# Patient Record
Sex: FEMALE | Race: WHITE | Employment: OTHER | URBAN - NONMETROPOLITAN AREA
[De-identification: names, ages, dates, MRNs, and addresses within clinical notes are randomized per-mention and may not be internally consistent; named-entity substitution may affect disease eponyms.]

---

## 2020-01-27 ENCOUNTER — OFFICE VISIT (OUTPATIENT)
Dept: FAMILY MEDICINE CLINIC | Age: 45
End: 2020-01-27
Payer: COMMERCIAL

## 2020-01-27 ENCOUNTER — HOSPITAL ENCOUNTER (OUTPATIENT)
Dept: GENERAL RADIOLOGY | Age: 45
Discharge: HOME | End: 2020-01-27
Payer: COMMERCIAL

## 2020-01-27 VITALS
HEART RATE: 60 BPM | OXYGEN SATURATION: 98 % | WEIGHT: 128 LBS | BODY MASS INDEX: 23.55 KG/M2 | DIASTOLIC BLOOD PRESSURE: 72 MMHG | HEIGHT: 62 IN | SYSTOLIC BLOOD PRESSURE: 138 MMHG | RESPIRATION RATE: 20 BRPM | TEMPERATURE: 97.6 F

## 2020-01-27 DIAGNOSIS — H10.33 ACUTE BACTERIAL CONJUNCTIVITIS OF BOTH EYES: ICD-10-CM

## 2020-01-27 LAB
ALBUMIN SERPL-MCNC: 4.6 G/DL (ref 3.5–5.2)
ALP BLD-CCNC: 65 U/L (ref 35–104)
ALT SERPL-CCNC: 17 U/L (ref 5–33)
ANION GAP SERPL CALCULATED.3IONS-SCNC: 15 MMOL/L (ref 7–19)
AST SERPL-CCNC: 26 U/L (ref 5–32)
BASOPHILS ABSOLUTE: 0.1 K/UL (ref 0–0.2)
BASOPHILS RELATIVE PERCENT: 0.9 % (ref 0–1)
BILIRUB SERPL-MCNC: 0.3 MG/DL (ref 0.2–1.2)
BUN BLDV-MCNC: 13 MG/DL (ref 6–20)
CALCIUM SERPL-MCNC: 9.3 MG/DL (ref 8.6–10)
CHLORIDE BLD-SCNC: 102 MMOL/L (ref 98–111)
CO2: 23 MMOL/L (ref 22–29)
CREAT SERPL-MCNC: 0.7 MG/DL (ref 0.5–0.9)
EOSINOPHILS ABSOLUTE: 0.5 K/UL (ref 0–0.6)
EOSINOPHILS RELATIVE PERCENT: 5.6 % (ref 0–5)
GFR NON-AFRICAN AMERICAN: >60
GLUCOSE BLD-MCNC: 64 MG/DL (ref 74–109)
HCT VFR BLD CALC: 43.9 % (ref 37–47)
HEMOGLOBIN: 14.1 G/DL (ref 12–16)
IMMATURE GRANULOCYTES #: 0 K/UL
LYMPHOCYTES ABSOLUTE: 2 K/UL (ref 1.1–4.5)
LYMPHOCYTES RELATIVE PERCENT: 23.9 % (ref 20–40)
MCH RBC QN AUTO: 32.9 PG (ref 27–31)
MCHC RBC AUTO-ENTMCNC: 32.1 G/DL (ref 33–37)
MCV RBC AUTO: 102.6 FL (ref 81–99)
MONOCYTES ABSOLUTE: 0.7 K/UL (ref 0–0.9)
MONOCYTES RELATIVE PERCENT: 9 % (ref 0–10)
NEUTROPHILS ABSOLUTE: 5 K/UL (ref 1.5–7.5)
NEUTROPHILS RELATIVE PERCENT: 60.4 % (ref 50–65)
PDW BLD-RTO: 12.6 % (ref 11.5–14.5)
PLATELET # BLD: 285 K/UL (ref 130–400)
PMV BLD AUTO: 10.9 FL (ref 9.4–12.3)
POTASSIUM SERPL-SCNC: 4.1 MMOL/L (ref 3.5–5)
RBC # BLD: 4.28 M/UL (ref 4.2–5.4)
SODIUM BLD-SCNC: 140 MMOL/L (ref 136–145)
TOTAL PROTEIN: 7.6 G/DL (ref 6.6–8.7)
WBC # BLD: 8.2 K/UL (ref 4.8–10.8)

## 2020-01-27 PROCEDURE — 76882 US LMTD JT/FCL EVL NVASC XTR: CPT

## 2020-01-27 PROCEDURE — 99203 OFFICE O/P NEW LOW 30 MIN: CPT | Performed by: NURSE PRACTITIONER

## 2020-01-27 RX ORDER — OMEGA-3S/DHA/EPA/FISH OIL/D3 300MG-1000
400 CAPSULE ORAL DAILY
COMMUNITY

## 2020-01-27 RX ORDER — OXYCODONE AND ACETAMINOPHEN 7.5; 325 MG/1; MG/1
1 TABLET ORAL EVERY 4 HOURS PRN
Status: ON HOLD | COMMUNITY
End: 2021-02-18 | Stop reason: HOSPADM

## 2020-01-27 RX ORDER — SENNA AND DOCUSATE SODIUM 50; 8.6 MG/1; MG/1
1 TABLET, FILM COATED ORAL DAILY
COMMUNITY
End: 2021-12-14

## 2020-01-27 RX ORDER — LANOLIN ALCOHOL/MO/W.PET/CERES
3 CREAM (GRAM) TOPICAL DAILY
COMMUNITY
End: 2021-06-03 | Stop reason: ALTCHOICE

## 2020-01-27 RX ORDER — TOBRAMYCIN 3 MG/ML
1 SOLUTION/ DROPS OPHTHALMIC EVERY 4 HOURS
Qty: 1 BOTTLE | Refills: 0 | Status: SHIPPED | OUTPATIENT
Start: 2020-01-27 | End: 2020-11-23 | Stop reason: SDUPTHER

## 2020-01-27 RX ORDER — DOXYCYCLINE HYCLATE 100 MG
100 TABLET ORAL 2 TIMES DAILY
Qty: 28 TABLET | Refills: 0 | Status: SHIPPED | OUTPATIENT
Start: 2020-01-27 | End: 2020-11-23 | Stop reason: SDUPTHER

## 2020-01-27 RX ORDER — ATORVASTATIN CALCIUM 40 MG/1
40 TABLET, FILM COATED ORAL DAILY
COMMUNITY
End: 2022-03-31 | Stop reason: SDUPTHER

## 2020-01-27 RX ORDER — ATENOLOL 50 MG/1
50 TABLET ORAL DAILY
COMMUNITY
End: 2021-03-16

## 2020-01-27 RX ORDER — FUROSEMIDE 20 MG/1
20 TABLET ORAL 2 TIMES DAILY
COMMUNITY
End: 2021-01-05 | Stop reason: SDUPTHER

## 2020-01-27 RX ORDER — LANOLIN ALCOHOL/MO/W.PET/CERES
1000 CREAM (GRAM) TOPICAL DAILY
COMMUNITY

## 2020-01-27 RX ORDER — IBUPROFEN 800 MG/1
800 TABLET ORAL EVERY 6 HOURS PRN
COMMUNITY
End: 2021-01-04 | Stop reason: ALTCHOICE

## 2020-01-27 SDOH — HEALTH STABILITY: MENTAL HEALTH: HOW OFTEN DO YOU HAVE A DRINK CONTAINING ALCOHOL?: NEVER

## 2020-01-27 ASSESSMENT — PATIENT HEALTH QUESTIONNAIRE - PHQ9
2. FEELING DOWN, DEPRESSED OR HOPELESS: 1
SUM OF ALL RESPONSES TO PHQ9 QUESTIONS 1 & 2: 1
1. LITTLE INTEREST OR PLEASURE IN DOING THINGS: 0
SUM OF ALL RESPONSES TO PHQ QUESTIONS 1-9: 1
SUM OF ALL RESPONSES TO PHQ QUESTIONS 1-9: 1

## 2020-01-27 ASSESSMENT — ENCOUNTER SYMPTOMS
EYE ITCHING: 1
TROUBLE SWALLOWING: 0
EYE DISCHARGE: 1
EYE PAIN: 1
STRIDOR: 0
BACK PAIN: 1
ABDOMINAL DISTENTION: 1
COUGH: 0

## 2020-01-27 NOTE — PROGRESS NOTES
on file   Other Topics Concern    Not on file   Social History Narrative    Not on file       Review of Systems   Constitutional: Negative for fever. HENT: Negative for congestion and trouble swallowing. Eyes: Positive for pain, discharge and itching. Respiratory: Negative for cough and stridor. Cardiovascular: Negative for leg swelling. Gastrointestinal: Positive for abdominal distention. Genitourinary: Positive for difficulty urinating, dysuria and pelvic pain. Musculoskeletal: Positive for back pain and myalgias. Skin: Positive for rash. Neurological: Negative for facial asymmetry and speech difficulty. Psychiatric/Behavioral: Positive for sleep disturbance. The patient is nervous/anxious. OBJECTIVE:    Physical Exam  Constitutional:       Appearance: Normal appearance. She is well-developed and well-groomed. HENT:      Head: Normocephalic and atraumatic. Right Ear: Tympanic membrane, ear canal and external ear normal. There is no impacted cerumen. Left Ear: Tympanic membrane, ear canal and external ear normal. There is no impacted cerumen. Nose: Nose normal.      Mouth/Throat:      Lips: Pink. Mouth: Mucous membranes are moist.      Dentition: Normal dentition. Pharynx: Oropharynx is clear. Uvula midline. Eyes:      General: Lids are normal.         Right eye: No discharge. Left eye: No discharge. Extraocular Movements: Extraocular movements intact. Conjunctiva/sclera: Conjunctivae normal.      Right eye: Right conjunctiva is not injected. Left eye: Left conjunctiva is not injected. Pupils: Pupils are equal, round, and reactive to light. Neck:      Musculoskeletal: Full passive range of motion without pain, normal range of motion and neck supple. Thyroid: No thyromegaly. Vascular: No carotid bruit or JVD. Cardiovascular:      Rate and Rhythm: Normal rate and regular rhythm. Pulses: Normal pulses. ICD-10-CM    1. Acute bacterial conjunctivitis of both eyes H10.33 CBC Auto Differential     Comprehensive Metabolic Panel     doxycycline hyclate (VIBRA-TABS) 100 MG tablet     tobramycin (TOBREX) 0.3 % ophthalmic solution   2. Inflammation of clitoris N76.2 doxycycline hyclate (VIBRA-TABS) 100 MG tablet     US EXTREMITY RIGHT NON VASCULAR LIMITED     CANCELED: US Soft Tissue Limited Area   3. Rash R21        PLAN:    Wesly Mercedes: New Patient (New patient has a rash on Lower abdomen / private area / had a spot that ruptured and now she is sick to stomach and eyes are swollen )  Review labs  Review US  RTC on Thursday     Diagnosis and orders for this visit are above.

## 2020-01-30 ENCOUNTER — OFFICE VISIT (OUTPATIENT)
Dept: FAMILY MEDICINE CLINIC | Age: 45
End: 2020-01-30
Payer: COMMERCIAL

## 2020-01-30 VITALS
DIASTOLIC BLOOD PRESSURE: 88 MMHG | SYSTOLIC BLOOD PRESSURE: 138 MMHG | RESPIRATION RATE: 20 BRPM | OXYGEN SATURATION: 98 % | TEMPERATURE: 98.8 F | HEART RATE: 57 BPM

## 2020-01-30 PROCEDURE — 99214 OFFICE O/P EST MOD 30 MIN: CPT | Performed by: NURSE PRACTITIONER

## 2020-01-30 PROCEDURE — 96372 THER/PROPH/DIAG INJ SC/IM: CPT | Performed by: NURSE PRACTITIONER

## 2020-01-30 RX ORDER — NYSTATIN 100000 [USP'U]/G
POWDER TOPICAL 3 TIMES DAILY
Qty: 60 G | Refills: 0 | Status: SHIPPED | OUTPATIENT
Start: 2020-01-30 | End: 2020-03-02 | Stop reason: SDUPTHER

## 2020-01-30 RX ORDER — CLONIDINE HYDROCHLORIDE 0.1 MG/1
0.1 TABLET ORAL 2 TIMES DAILY PRN
COMMUNITY
End: 2021-03-16 | Stop reason: ALTCHOICE

## 2020-01-30 RX ORDER — FLUCONAZOLE 150 MG/1
150 TABLET ORAL DAILY
Qty: 3 TABLET | Refills: 0 | Status: SHIPPED | OUTPATIENT
Start: 2020-01-30 | End: 2020-02-02

## 2020-01-30 RX ORDER — DEXAMETHASONE SODIUM PHOSPHATE 10 MG/ML
4 INJECTION INTRAMUSCULAR; INTRAVENOUS ONCE
Status: COMPLETED | OUTPATIENT
Start: 2020-01-30 | End: 2020-01-30

## 2020-01-30 RX ORDER — TRIAMCINOLONE ACETONIDE 40 MG/ML
40 INJECTION, SUSPENSION INTRA-ARTICULAR; INTRAMUSCULAR ONCE
Status: COMPLETED | OUTPATIENT
Start: 2020-01-30 | End: 2020-01-30

## 2020-01-30 RX ORDER — QUETIAPINE FUMARATE 50 MG/1
50 TABLET, FILM COATED ORAL 2 TIMES DAILY
Qty: 60 TABLET | Refills: 3 | Status: SHIPPED | OUTPATIENT
Start: 2020-01-30 | End: 2020-04-28

## 2020-01-30 RX ADMIN — TRIAMCINOLONE ACETONIDE 40 MG: 40 INJECTION, SUSPENSION INTRA-ARTICULAR; INTRAMUSCULAR at 13:38

## 2020-01-30 RX ADMIN — DEXAMETHASONE SODIUM PHOSPHATE 4 MG: 10 INJECTION INTRAMUSCULAR; INTRAVENOUS at 13:37

## 2020-01-30 ASSESSMENT — ENCOUNTER SYMPTOMS
EYE REDNESS: 1
TROUBLE SWALLOWING: 0
SHORTNESS OF BREATH: 0
DIARRHEA: 0
ABDOMINAL PAIN: 0
EYE ITCHING: 1
COUGH: 0

## 2020-01-30 NOTE — PROGRESS NOTES
MELIDA Neumann   QUEtiapine (SEROQUEL) 50 MG tablet Take 1 tablet by mouth 2 times daily Yes MELIDA Neumann   oxyCODONE-acetaminophen (PERCOCET) 7.5-325 MG per tablet Take 1 tablet by mouth every 4 hours as needed for Pain. Yes Historical Provider, MD   atorvastatin (LIPITOR) 40 MG tablet Take 40 mg by mouth daily Yes Historical Provider, MD   ibuprofen (ADVIL;MOTRIN) 800 MG tablet Take 800 mg by mouth every 6 hours as needed for Pain Yes Historical Provider, MD   atenolol (TENORMIN) 50 MG tablet Take 50 mg by mouth daily Yes Historical Provider, MD   furosemide (LASIX) 20 MG tablet Take 20 mg by mouth 2 times daily Yes Historical Provider, MD   sennosides-docusate sodium (SENOKOT-S) 8.6-50 MG tablet Take 1 tablet by mouth daily Yes Historical Provider, MD   guaiFENesin (EXPECTORANT COUGH CONTROL PO) Take by mouth Yes Historical Provider, MD   vitamin D3 (CHOLECALCIFEROL) 10 MCG (400 UNIT) TABS tablet Take 400 Units by mouth daily Yes Historical Provider, MD   vitamin B-12 (CYANOCOBALAMIN) 1000 MCG tablet Take 1,000 mcg by mouth daily Yes Historical Provider, MD   melatonin 3 MG TABS tablet Take 3 mg by mouth daily Yes Historical Provider, MD   doxycycline hyclate (VIBRA-TABS) 100 MG tablet Take 1 tablet by mouth 2 times daily for 14 days Yes MELIDA Neumann   tobramycin (TOBREX) 0.3 % ophthalmic solution Place 1 drop into both eyes every 4 hours for 10 days Yes MELIDA Neumann      Allergies   Allergen Reactions    Ceclor [Cefaclor] Swelling    Penicillins Hives    Sulfa Antibiotics        Review of Systems   Constitutional: Negative for chills and fever. HENT: Negative for congestion and trouble swallowing. Eyes: Positive for redness and itching. Respiratory: Negative for cough and shortness of breath. Gastrointestinal: Negative for abdominal pain and diarrhea. Genitourinary: Negative for difficulty urinating and pelvic pain.    Musculoskeletal: Positive for arthralgias (KENALOG-40) injection 40 mg   2. Fungal dermatitis B36.9 fluconazole (DIFLUCAN) 150 MG tablet   3. Insomnia due to medical condition G47.01 QUEtiapine (SEROQUEL) 50 MG tablet   4. Stress disorder, acute F43.0        PLAN:    Nae Meyers: Follow-up (eyes are still swollen / antibiotics are strong hard to keep down. not eating well upset stomach / low grade fever)    Consult Iggy Romero   Diagnosis and orders for this visit are above.

## 2020-02-03 ENCOUNTER — OFFICE VISIT (OUTPATIENT)
Dept: FAMILY MEDICINE CLINIC | Age: 45
End: 2020-02-03
Payer: COMMERCIAL

## 2020-02-03 VITALS
DIASTOLIC BLOOD PRESSURE: 84 MMHG | HEART RATE: 67 BPM | OXYGEN SATURATION: 99 % | TEMPERATURE: 98.1 F | SYSTOLIC BLOOD PRESSURE: 136 MMHG | RESPIRATION RATE: 20 BRPM

## 2020-02-03 PROCEDURE — 99213 OFFICE O/P EST LOW 20 MIN: CPT | Performed by: NURSE PRACTITIONER

## 2020-02-03 RX ORDER — DOXYCYCLINE HYCLATE 100 MG
100 TABLET ORAL 2 TIMES DAILY
Qty: 20 TABLET | Refills: 0 | Status: SHIPPED | OUTPATIENT
Start: 2020-02-03 | End: 2020-08-06

## 2020-02-03 ASSESSMENT — ENCOUNTER SYMPTOMS
EYE DISCHARGE: 0
EYE ITCHING: 0
BACK PAIN: 1
EYE REDNESS: 0
EYE PAIN: 0

## 2020-02-03 NOTE — PROGRESS NOTES
SUBJECTIVE:  Here today for the swollen eyes follow-up  Patient ID: Jessenia Iyer is a 38 y.o. female. HPI:   HPI   Desponded very nicely to the steroid injection we gave her her eyes are no longer red or swollen she is keeping the cats out of her home and she is trying or her room and she is trying to come to keep away from them the rash in the pelvic area is also gone she says she looks normal now she has still noticed that she is got a little bit of discharge still coming from the clitoris area we will do another round of doxycycline in case it is an infected gland and then if it completely resolves good if not then will going to go on and send her to a GYN    Going to try and get her records from the past as far as her heart condition is concerned so that we can see what type of heart surgery she said in the past she is going to give us the name of the patient and signed a release form  Past Medical History:   Diagnosis Date    Cancer ([facility] [address]     cervical, lymphoma    Congenital heart disease     MRSA infection     Stroke [facility])       Prior to Visit Medications    Medication Sig Taking? Authorizing Provider   doxycycline hyclate (VIBRA-TABS) 100 MG tablet Take 1 tablet by mouth 2 times daily for 10 days Yes MELIDA Koroma   cloNIDine (CATAPRES) 0.1 MG tablet Take 0.1 mg by mouth 2 times daily Yes Historical Provider, MD   nystatin (MYCOSTATIN) 672670 UNIT/GM powder Apply topically 3 times daily Abdomen Yes LesiaMELIDA Cabrera   QUEtiapine (SEROQUEL) 50 MG tablet Take 1 tablet by mouth 2 times daily Yes MELIDA Koroma   oxyCODONE-acetaminophen (PERCOCET) 7.5-325 MG per tablet Take 1 tablet by mouth every 4 hours as needed for Pain.  Yes Historical Provider, MD   atorvastatin (LIPITOR) 40 MG tablet Take 40 mg by mouth daily Yes Historical Provider, MD   ibuprofen (ADVIL;MOTRIN) 800 MG tablet Take 800 mg by mouth every 6 hours as needed for Pain Yes Historical Provider, MD   atenolol (TENORMIN) 50 Musculoskeletal: Normal range of motion. Cardiovascular:      Rate and Rhythm: Normal rate and regular rhythm. Heart sounds: Normal heart sounds. No murmur. Pulmonary:      Effort: Pulmonary effort is normal.      Breath sounds: Normal breath sounds. Skin:     General: Skin is warm and dry. Neurological:      Mental Status: She is alert and oriented to person, place, and time. Motor: No weakness or tremor. Coordination: Coordination is intact. Psychiatric:         Attention and Perception: Attention and perception normal.         Mood and Affect: Mood normal.         Speech: Speech normal.         Behavior: Behavior normal. Behavior is cooperative. Thought Content: Thought content normal.         Cognition and Memory: Cognition and memory normal.         Judgment: Judgment normal.        /84 (Site: Left Upper Arm, Position: Sitting, Cuff Size: Medium Adult)   Pulse 67   Temp 98.1 °F (36.7 °C) (Oral)   Resp 20   SpO2 99%      ASSESSMENT:      ICD-10-CM    1. Staph infection B95.8 doxycycline hyclate (VIBRA-TABS) 100 MG tablet       PLAN:    Nae Meyers: Follow-up (allergic reaction is cleared up / also has some questions about some injections she is going to be getting )      Diagnosis and orders for this visit are above.

## 2020-02-18 ENCOUNTER — OFFICE VISIT (OUTPATIENT)
Dept: PSYCHIATRY | Age: 45
End: 2020-02-18
Payer: COMMERCIAL

## 2020-02-18 PROCEDURE — 90791 PSYCH DIAGNOSTIC EVALUATION: CPT | Performed by: SOCIAL WORKER
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
## 2020-02-18 NOTE — PATIENT INSTRUCTIONS
1.  Return in 2 Weeks as scheduled. 2.  Call Jeannie Thomas if you need to come in earlier or reschedule at [address]    1. Recognize Stress:  Learning to recognize when your body is reacting to stress and identifying our stressors are the first steps in managing stress. 2. Take a Break:  A change of pace, no matter how short, gives us a new outlook on old problems. Take a vacation 20 minutes a day - enjoy a change from the daily routine. 3. Learn to Relax:  Under stress, the muscles in our bodies stay tight. One of the most effective ways to combat tensions is deep muscle relaxation. Other techniques that produce muscle and mental relaxation are yoga, prayer, and deep breathing. 4. Be Nutritionally Aware:  Good nutrition is vital to optimum health, and is especially critical when we are under unusual stress, or going through a major life change. 5. Exercise Regularly:  Just like nutrition, exercise is imperative for maintaining good fitness. Whatever you enjoy - swimming, walking, jogging, aerobic exercise - will help you let off steam and work out stress. 6. Plan your Work:  Tension and anxiety really build up when our work seems endless. Plan your work to use time and energy more efficiently. Take one thing at a time. 7. Talk it Over: This may be the most important thing you can do for yourself if you cant get a handle on things. Find a good listener. Just as a pressure relief valve allows steam to flow out of a pressure cooker and keeps it from blowing up, so talking allows stress to flow out of the body and keeps us from blowing up. 8. Accept What You Cannot Change:  If the problem is beyond your control at this time, try your best to accept it until you can change it. It beats spinning your wheels and getting nowhere. 9. Evaluate Your Perceptions:  What we think is sometimes what we feel.   If we constantly think unrealistic or alarming thoughts

## 2020-03-02 ENCOUNTER — OFFICE VISIT (OUTPATIENT)
Dept: FAMILY MEDICINE CLINIC | Age: 45
End: 2020-03-02
Payer: COMMERCIAL

## 2020-03-02 VITALS
HEART RATE: 58 BPM | DIASTOLIC BLOOD PRESSURE: 86 MMHG | SYSTOLIC BLOOD PRESSURE: 138 MMHG | RESPIRATION RATE: 20 BRPM | WEIGHT: 133 LBS | TEMPERATURE: 98.3 F | BODY MASS INDEX: 24.33 KG/M2 | OXYGEN SATURATION: 98 %

## 2020-03-02 PROCEDURE — 99214 OFFICE O/P EST MOD 30 MIN: CPT | Performed by: NURSE PRACTITIONER

## 2020-03-02 RX ORDER — NYSTATIN 100000 [USP'U]/G
POWDER TOPICAL 3 TIMES DAILY
Qty: 60 G | Refills: 0 | Status: SHIPPED | OUTPATIENT
Start: 2020-03-02 | End: 2020-08-06

## 2020-03-02 RX ORDER — NYSTATIN 100000 [USP'U]/G
POWDER TOPICAL 3 TIMES DAILY
Qty: 60 G | Refills: 0 | Status: CANCELLED | OUTPATIENT
Start: 2020-03-02

## 2020-03-02 RX ORDER — CLONAZEPAM 0.5 MG/1
0.5 TABLET ORAL 2 TIMES DAILY PRN
Qty: 2 TABLET | Refills: 0 | Status: SHIPPED | OUTPATIENT
Start: 2020-03-02 | End: 2020-08-03 | Stop reason: ALTCHOICE

## 2020-03-02 RX ORDER — NYSTATIN 100000 U/G
OINTMENT TOPICAL
Qty: 30 G | Refills: 2 | Status: SHIPPED | OUTPATIENT
Start: 2020-03-02 | End: 2021-02-04

## 2020-03-02 ASSESSMENT — ENCOUNTER SYMPTOMS
SINUS PAIN: 0
SINUS PRESSURE: 0
COUGH: 0
NAUSEA: 0
VOMITING: 0
SHORTNESS OF BREATH: 1
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
## 2020-04-28 ENCOUNTER — TELEPHONE (OUTPATIENT)
Dept: PRIMARY CARE CLINIC | Age: 45
End: 2020-04-28

## 2020-04-28 RX ORDER — QUETIAPINE FUMARATE 50 MG/1
TABLET, FILM COATED ORAL
Qty: 180 TABLET | Refills: 0 | Status: SHIPPED | OUTPATIENT
Start: 2020-04-28 | End: 2020-07-30

## 2020-04-30 ENCOUNTER — NURSE TRIAGE (OUTPATIENT)
Dept: OTHER | Facility: CLINIC | Age: 45
End: 2020-04-30

## 2020-04-30 NOTE — TELEPHONE ENCOUNTER
Reason for Disposition   Systolic BP >= 893 OR Diastolic >= 435, and any cardiac or neurologic symptoms (e.g., chest pain, difficulty breathing, unsteady gait, blurred vision)    Protocols used: HIGH BLOOD PRESSURE-ADULT-[place]    Patient called pre-service center [facility]) [address] to schedule appointment, with red flag complaint, transferred to RN access for triage. Pt states her BP has been high lately. She has been taking BP 2 x daily. States 200/160, 170/ 100, 180/ 125, 183/ 160, 178/138. Today readings 247/185. Pt has a wrist cuff and has had it checked for accuracy. Pt also c/o HA, some blurred vision, and dizziness. She states \"I haven't seen my cardiologist in awhile. \"     Pt is also very tearful about some home and social issues. She states her disability is being taken away and she will be losing her insurance in a month. Triage indicates for pt to be seen in the ED/ [facility] or PCP office with approval. Writer recommends pt to be seen in the ED with BP readings and depressive state. Pt instructed to call back for any new or worsening symptoms. Patient verbalized understanding. Patient denies any other questions or concerns. Please do not respond to the triage nurse through this encounter. Any subsequent communication should be directly with the patient.

## 2020-05-11 ENCOUNTER — OFFICE VISIT (OUTPATIENT)
Dept: PRIMARY CARE CLINIC | Age: 45
End: 2020-05-11
Payer: COMMERCIAL

## 2020-05-11 VITALS — OXYGEN SATURATION: 96 % | TEMPERATURE: 97.6 F | HEART RATE: 56 BPM

## 2020-05-11 PROCEDURE — 99213 OFFICE O/P EST LOW 20 MIN: CPT | Performed by: NURSE PRACTITIONER

## 2020-05-11 RX ORDER — AZITHROMYCIN 250 MG/1
250 TABLET, FILM COATED ORAL SEE ADMIN INSTRUCTIONS
Qty: 6 TABLET | Refills: 0 | Status: SHIPPED | OUTPATIENT
Start: 2020-05-11 | End: 2020-05-16

## 2020-05-11 NOTE — PROGRESS NOTES
to other information, the printed after visit summary provided to the patient includes:  [x] COVID-19 Self care instructions  [x] COVID-19 General patient information    An  electronic signature was used to authenticate this note.     --MELIDA Lu on 5/11/2020 at 11:25 AM

## 2020-05-11 NOTE — PATIENT INSTRUCTIONS
Patient Education        Learning About Coronavirus [phone]  Coronavirus [phone]): Overview  What is coronavirus (EXASO-18)? The coronavirus disease (COVID-19) is caused by a virus. It is an illness that was first found in [place], [place], in December 2019. It has since spread worldwide. The virus can cause fever, cough, and trouble breathing. In severe cases, it can cause pneumonia and make it hard to breathe without help. It can cause death. Coronaviruses are a large group of viruses. They cause the common cold. They also cause more serious illnesses like Middle East respiratory syndrome (MERS) and severe acute respiratory syndrome (SARS). COVID-19 is caused by a novel coronavirus. That means it's a new type that has not been seen in people before. This virus spreads person-to-person through droplets from coughing and sneezing. It can also spread when you are close to someone who is infected. And it can spread when you touch something that has the virus on it, such as a doorknob or a tabletop. What can you do to protect yourself from coronavirus (COVID-19)? The best way to protect yourself from getting sick is to:  · Avoid areas where there is an outbreak. · Avoid contact with people who may be infected. · Wash your hands often with soap or alcohol-based hand sanitizers. · Avoid crowds and try to stay at least 6 feet away from other people. · Wash your hands often, especially after you cough or sneeze. Use soap and water, and scrub for at least 20 seconds. If soap and water aren't available, use an alcohol-based hand . · Avoid touching your mouth, nose, and eyes. What can you do to avoid spreading the virus to others? To help avoid spreading the virus to others:  · Cover your mouth with a tissue when you cough or sneeze. Then throw the tissue in the trash. · Use a disinfectant to clean things that you touch often. · Wear a cloth face cover if you have to go to public areas.   · Stay home if you are sick or have been exposed to the virus. Don't go to school, work, or public areas. And don't use public transportation. · If you are sick:  ? Leave your home only if you need to get medical care. But call the doctor's office first so they know you're coming. And wear a face cover. ? Wear the face cover whenever you're around other people. It can help stop the spread of the virus when you cough or sneeze. ? Clean and disinfect your home every day. Use household  and disinfectant wipes or sprays. Take special care to clean things that you grab with your hands. These include doorknobs, remote controls, phones, and handles on your refrigerator and microwave. And don't forget countertops, tabletops, bathrooms, and computer keyboards. When to call for help  [id number] anytime you think you may need emergency care. For example, call if:  · You have severe trouble breathing. (You can't talk at all.)  · You have constant chest pain or pressure. · You are severely dizzy or lightheaded. · You are confused or can't think clearly. · Your face and lips have a blue color. · You pass out (lose consciousness) or are very hard to wake up. Call your doctor now if you develop symptoms such as:  · Shortness of breath. · Fever. · Cough. If you need to get care, call ahead to the doctor's office for instructions before you go. Make sure you wear a face cover to prevent exposing other people to the virus. Where can you get the latest information? The following health organizations are tracking and studying this virus. Their websites contain the most up-to-date information. Tod Hong also learn what to do if you think you may have been exposed to the virus. · U.S. Centers for Disease Control and Prevention (CDC): The CDC provides updated news about the disease and travel advice. The website also tells you how to prevent the spread of infection.  www.cdc.gov  · World Health Organization [facility]): WHO offers label.  · Sponge your body with lukewarm water to help with fever. Don't use cold water or ice. · Use petroleum jelly on sore skin. This can help if the skin around your nose and lips becomes sore from rubbing a lot with tissues. Tips for isolation  · Wear a cloth face cover when you are around other people. It can help stop the spread of the virus when you cough or sneeze. · Limit contact with people in your home. If possible, stay in a separate bedroom and use a separate bathroom. · Avoid contact with pets and other animals. · Cover your mouth and nose with a tissue when you cough or sneeze. Then throw it in the trash right away. · Wash your hands often, especially after you cough or sneeze. Use soap and water, and scrub for at least 20 seconds. If soap and water aren't available, use an alcohol-based hand . · Don't share personal household items. These include bedding, towels, cups and glasses, and eating utensils. · Clean and disinfect your home every day. Use household  and disinfectant wipes or sprays. Take special care to clean things that you grab with your hands. These include doorknobs, remote controls, phones, and handles on your refrigerator and microwave. And don't forget countertops, tabletops, bathrooms, and computer keyboards. When should you call for help? [id number] anytime you think you may need emergency care. For example, call if you have life-threatening symptoms, such as:  · You have severe trouble breathing. (You can't talk at all.)  · You have constant chest pain or pressure. · You are severely dizzy or lightheaded. · You are confused or can't think clearly. · Your face and lips have a blue color. · You pass out (lose consciousness) or are very hard to wake up. Call your doctor now or seek immediate medical care if:  · You have moderate trouble breathing. (You can't speak a full sentence.)  · You are coughing up blood (more than about 1 teaspoon).   · You have signs of low blood pressure. These include feeling lightheaded; being too weak to stand; and having cold, pale, clammy skin. Watch closely for changes in your health, and be sure to contact your doctor if:  · Your symptoms get worse. · You are not getting better as expected. Call before you go to the doctor's office. Follow their instructions. And wear a cloth face cover. Current as of: April 24, 2020               Content Version: 12.4  © 2006-2020 [employer]. Care instructions adapted under license by your healthcare professional. If you have questions about a medical condition or this instruction, always ask your healthcare professional. [address] any warranty or liability for your use of this information.

## 2020-05-12 ENCOUNTER — CARE COORDINATION (OUTPATIENT)
Dept: CARE COORDINATION | Age: 45
End: 2020-05-12

## 2020-05-13 ENCOUNTER — CARE COORDINATION (OUTPATIENT)
Dept: FAMILY MEDICINE CLINIC | Age: 45
End: 2020-05-13

## 2020-05-14 ENCOUNTER — VIRTUAL VISIT (OUTPATIENT)
Dept: FAMILY MEDICINE CLINIC | Age: 45
End: 2020-05-14

## 2020-05-14 LAB
REPORT: NORMAL
SARS-COV-2: NOT DETECTED
THIS TEST SENT TO: NORMAL

## 2020-05-15 ENCOUNTER — TELEPHONE (OUTPATIENT)
Dept: FAMILY MEDICINE CLINIC | Age: 45
End: 2020-05-15

## 2020-05-15 NOTE — TELEPHONE ENCOUNTER
Ralph Lowe would like a call to discuss her Labs results. her preferred call back time is  Anytime     Pt is stating that she is still sick.

## 2020-05-15 NOTE — TELEPHONE ENCOUNTER
I have tried calling Nae several times . It goes straight to  then  is not set up yet. . She no showed for her appointment with Renato Tovar on 05/14/2020 . I have been trying to reach her before then about her results . If she call again . She needs an appointment . Renato Tovar Is only seeing Patients on Thursdays.

## 2020-05-21 ENCOUNTER — VIRTUAL VISIT (OUTPATIENT)
Dept: FAMILY MEDICINE CLINIC | Age: 45
End: 2020-05-21

## 2020-07-10 ENCOUNTER — TELEPHONE (OUTPATIENT)
Dept: FAMILY MEDICINE CLINIC | Age: 45
End: 2020-07-10

## 2020-07-10 NOTE — TELEPHONE ENCOUNTER
I have had 2 messages from [facility]  About how urgent it is we call her about medical records and form for Disability . I have been trying to call her back . However, when I call I cant leave a message . My call goes straight to  and then it says Voice mail  not set up yet.

## 2020-07-15 ENCOUNTER — TELEPHONE (OUTPATIENT)
Dept: FAMILY MEDICINE CLINIC | Age: 45
End: 2020-07-15

## 2020-07-15 NOTE — TELEPHONE ENCOUNTER
Qi Lamberto finally got in touch with me about her Disability papers. She had been leaving messages and when I tried to call her I couldn't reach her . So she is getting her disability recerted . She has some one named Torres Connell helping her . She said that she had been sending us records release and we have not answered . I have not seen anything come through this office . I tried to call this Torres Connell at [phone] the number Qi Orantes gave me and there was no answer and voice mail has not been set up yet.

## 2020-07-31 NOTE — TELEPHONE ENCOUNTER
Jeanne Patiño is calling again about a break out she is having again . Last time we treated with antibiotic and nystatin powder and cream . She is wanting refills on this .

## 2020-08-03 ENCOUNTER — PROCEDURE VISIT (OUTPATIENT)
Dept: FAMILY MEDICINE CLINIC | Age: 45
End: 2020-08-03
Payer: MEDICAID

## 2020-08-03 VITALS
TEMPERATURE: 97 F | BODY MASS INDEX: 24.87 KG/M2 | SYSTOLIC BLOOD PRESSURE: 140 MMHG | HEART RATE: 52 BPM | RESPIRATION RATE: 20 BRPM | DIASTOLIC BLOOD PRESSURE: 78 MMHG | OXYGEN SATURATION: 99 % | WEIGHT: 136 LBS

## 2020-08-03 DIAGNOSIS — R33.9 URINARY RETENTION: ICD-10-CM

## 2020-08-03 DIAGNOSIS — R80.9 PROTEINURIA, UNSPECIFIED TYPE: ICD-10-CM

## 2020-08-03 LAB
ALBUMIN SERPL-MCNC: 4.5 G/DL (ref 3.5–5.2)
ALP BLD-CCNC: 67 U/L (ref 35–104)
ALT SERPL-CCNC: 19 U/L (ref 5–33)
ANION GAP SERPL CALCULATED.3IONS-SCNC: 13 MMOL/L (ref 7–19)
AST SERPL-CCNC: 21 U/L (ref 5–32)
BILIRUB SERPL-MCNC: 0.5 MG/DL (ref 0.2–1.2)
BILIRUBIN URINE: ABNORMAL
BLOOD, URINE: NEGATIVE
BUN BLDV-MCNC: 17 MG/DL (ref 6–20)
CALCIUM SERPL-MCNC: 9.3 MG/DL (ref 8.6–10)
CHLORIDE BLD-SCNC: 101 MMOL/L (ref 98–111)
CLARITY: ABNORMAL
CO2: 26 MMOL/L (ref 22–29)
COLOR: YELLOW
CREAT SERPL-MCNC: 0.8 MG/DL (ref 0.5–0.9)
GFR AFRICAN AMERICAN: >59
GFR NON-AFRICAN AMERICAN: >60
GLUCOSE BLD-MCNC: 80 MG/DL (ref 74–109)
GLUCOSE URINE: NEGATIVE MG/DL
KETONES, URINE: NEGATIVE MG/DL
LEUKOCYTE ESTERASE, URINE: NEGATIVE
NITRITE, URINE: NEGATIVE
PH UA: 5.5 (ref 5–8)
POTASSIUM SERPL-SCNC: 4 MMOL/L (ref 3.5–5)
PROTEIN UA: 30 MG/DL
SODIUM BLD-SCNC: 140 MMOL/L (ref 136–145)
SPECIFIC GRAVITY UA: >=1.03 (ref 1–1.03)
TOTAL PROTEIN: 7.5 G/DL (ref 6.6–8.7)
URINE REFLEX TO CULTURE: NO
URINE TYPE: ABNORMAL
UROBILINOGEN, URINE: 0.2 E.U./DL

## 2020-08-03 PROCEDURE — G8427 DOCREV CUR MEDS BY ELIG CLIN: HCPCS | Performed by: NURSE PRACTITIONER

## 2020-08-03 PROCEDURE — 4004F PT TOBACCO SCREEN RCVD TLK: CPT | Performed by: NURSE PRACTITIONER

## 2020-08-03 PROCEDURE — G8420 CALC BMI NORM PARAMETERS: HCPCS | Performed by: NURSE PRACTITIONER

## 2020-08-03 PROCEDURE — 99214 OFFICE O/P EST MOD 30 MIN: CPT | Performed by: NURSE PRACTITIONER

## 2020-08-03 RX ORDER — FLUOXETINE HYDROCHLORIDE 20 MG/1
20 CAPSULE ORAL DAILY
Qty: 30 CAPSULE | Refills: 0 | Status: SHIPPED | OUTPATIENT
Start: 2020-08-03 | End: 2021-02-08 | Stop reason: SDUPTHER

## 2020-08-03 RX ORDER — CLOTRIMAZOLE AND BETAMETHASONE DIPROPIONATE 10; .5 MG/ML; MG/ML
LOTION TOPICAL
Qty: 30 ML | Refills: 2 | Status: SHIPPED | OUTPATIENT
Start: 2020-08-03 | End: 2021-01-04 | Stop reason: ALTCHOICE

## 2020-08-03 ASSESSMENT — ENCOUNTER SYMPTOMS
COUGH: 0
FACIAL SWELLING: 0
CONSTIPATION: 1
SHORTNESS OF BREATH: 1
ABDOMINAL PAIN: 0
COLOR CHANGE: 1
BACK PAIN: 1

## 2020-08-03 NOTE — PROGRESS NOTES
SUBJECTIVE:  Here today for rash, depression, and bladder problems. Patient ID: Khai Schroeder is a 38 y.o. female. HPI:   HPI   Still with her aunt. Disability being review. Due to no home. Address. Depression: is worse. Urinary     Rash    Tere Suggs is here today for a few things her depression is actually gotten worse her disability has stopped temporarily the reevaluating it because she had been out of the home and her address change course she is living with her aunt because of what happened previously with her  whenever she got out of the hospital.  But something happened with a address change and they are not giving her disability check so every bit of money that she saved up for a car and hopefully a new place to live is placed on hold so she still with her aunt. She in living with her and just stays strictly in her room her rash that she has with her face swelling her and refuses to believe that she might be allergic to the cat unless the cat in her room so were constantly battling allergy type symptoms the rash that she has here is more in the groin, moisture related and appears to be more fungal so were going to try and treat that with some cream and medication  Urinary retention actually is not retaining urine but more of decreased urine one thing that was identified and she is got quite a bit of protein in her urine so we will go to investigate this a little bit further  Past Medical History:   Diagnosis Date    Cancer ([facility] [address]     cervical, lymphoma    Congenital heart disease     MRSA infection     Stroke [facility])       Prior to Visit Medications    Medication Sig Taking? Authorizing Provider   FLUoxetine (PROZAC) 20 MG capsule Take 1 capsule by mouth daily Yes MELIDA Blevins   clotrimazole-betamethasone (LOTRISONE) 1-0.05 % lotion Apply topically 2 times daily.  In the groin Yes MELIDA Blevins   QUEtiapine (SEROQUEL) 50 MG tablet Take 1 tablet by mouth twice daily Yes MELIDA Hammonds   nystatin (MYCOSTATIN) 113461 UNIT/GM ointment Apply topically 2 times daily. Yes MELIDA Hammonds   cloNIDine (CATAPRES) 0.1 MG tablet Take 0.1 mg by mouth 2 times daily Yes Historical Provider, MD   oxyCODONE-acetaminophen (PERCOCET) 7.5-325 MG per tablet Take 1 tablet by mouth every 4 hours as needed for Pain. Yes Historical Provider, MD   atorvastatin (LIPITOR) 40 MG tablet Take 40 mg by mouth daily Yes Historical Provider, MD   ibuprofen (ADVIL;MOTRIN) 800 MG tablet Take 800 mg by mouth every 6 hours as needed for Pain Yes Historical Provider, MD   atenolol (TENORMIN) 50 MG tablet Take 50 mg by mouth daily Yes Historical Provider, MD   furosemide (LASIX) 20 MG tablet Take 20 mg by mouth 2 times daily Yes Historical Provider, MD   sennosides-docusate sodium (SENOKOT-S) 8.6-50 MG tablet Take 1 tablet by mouth daily Yes Historical Provider, MD   guaiFENesin (EXPECTORANT COUGH CONTROL PO) Take by mouth Yes Historical Provider, MD   vitamin D3 (CHOLECALCIFEROL) 10 MCG (400 UNIT) TABS tablet Take 400 Units by mouth daily Yes Historical Provider, MD   vitamin B-12 (CYANOCOBALAMIN) 1000 MCG tablet Take 1,000 mcg by mouth daily Yes Historical Provider, MD   NYSTATIN 462110 UNIT/GM powder APPLY TO ABDOMEN THREE TIMES DAILY  MELIDA Hammonds   doxycycline hyclate (VIBRA-TABS) 100 MG tablet Take 1 tablet by mouth twice daily for 10 days  MELIDA Hammonds   melatonin 3 MG TABS tablet Take 3 mg by mouth daily  Historical Provider, MD      Allergies   Allergen Reactions    Ceclor [Cefaclor] Swelling    Penicillins Hives    Sulfa Antibiotics        Review of Systems   Constitutional: Negative for unexpected weight change. HENT: Negative for congestion and facial swelling. Respiratory: Positive for shortness of breath. Negative for cough. Cardiovascular: Negative for chest pain and leg swelling. Gastrointestinal: Positive for constipation. Negative for abdominal pain. Genitourinary: Positive for decreased urine volume and difficulty urinating. Musculoskeletal: Positive for arthralgias and back pain. Skin: Positive for color change and rash. Neurological: Positive for headaches. Negative for dizziness. Psychiatric/Behavioral: Positive for dysphoric mood. Negative for self-injury and suicidal ideas. The patient is nervous/anxious. Hands swell    OBJECTIVE:    Physical Exam  Constitutional:       Appearance: Normal appearance. She is well-developed and well-groomed. HENT:      Head: Normocephalic and atraumatic. Right Ear: Tympanic membrane, ear canal and external ear normal. There is no impacted cerumen. Left Ear: Tympanic membrane, ear canal and external ear normal. There is no impacted cerumen. Nose: Nose normal.      Mouth/Throat:      Lips: Pink. Mouth: Mucous membranes are moist.      Dentition: Normal dentition. Pharynx: Oropharynx is clear. Uvula midline. Eyes:      General: Lids are normal.         Right eye: No discharge. Left eye: No discharge. Extraocular Movements: Extraocular movements intact. Conjunctiva/sclera: Conjunctivae normal.      Right eye: Right conjunctiva is not injected. Left eye: Left conjunctiva is not injected. Pupils: Pupils are equal, round, and reactive to light. Neck:      Musculoskeletal: Full passive range of motion without pain, normal range of motion and neck supple. Thyroid: No thyromegaly. Vascular: No carotid bruit or JVD. Cardiovascular:      Rate and Rhythm: Normal rate and regular rhythm. Pulses: Normal pulses. Carotid pulses are 2+ on the right side and 2+ on the left side. Radial pulses are 2+ on the right side and 2+ on the left side. Heart sounds: Normal heart sounds, S1 normal and S2 normal. No murmur. Pulmonary:      Effort: Pulmonary effort is normal.      Breath sounds: Normal breath sounds.    Abdominal:      General: Bowel sounds are normal. There is no distension or abdominal bruit. Palpations: Abdomen is soft. There is no mass. Hernia: No hernia is present. Musculoskeletal: Normal range of motion. Right lower leg: No edema. Left lower leg: No edema. Lymphadenopathy:      Cervical: No cervical adenopathy. Right cervical: No superficial cervical adenopathy. Left cervical: No superficial cervical adenopathy. Upper Body:      Right upper body: No supraclavicular adenopathy. Left upper body: No supraclavicular adenopathy. Skin:     General: Skin is warm and dry. Coloration: Skin is not pale. Findings: No lesion or rash. Nails: There is no clubbing. Neurological:      Mental Status: She is alert and oriented to person, place, and time. Motor: No weakness or tremor. Coordination: Coordination normal.      Deep Tendon Reflexes: Reflexes are normal and symmetric. Psychiatric:         Attention and Perception: Attention normal.         Mood and Affect: Mood normal.         Speech: Speech normal.         Behavior: Behavior normal. Behavior is cooperative. Thought Content: Thought content normal.         Cognition and Memory: Cognition and memory normal.         Judgment: Judgment normal.        BP (!) 140/78 (Site: Left Upper Arm, Position: Sitting, Cuff Size: Medium Adult)   Pulse 52   Temp 97 °F (36.1 °C) (Temporal)   Resp 20   Wt 136 lb (61.7 kg)   SpO2 99%   BMI 24.87 kg/m²      ASSESSMENT:      ICD-10-CM    1. Urinary retention  R33.9 Urinalysis Reflex to Culture   2. Moderate episode of recurrent major depressive disorder (HCC)  F33.1 FLUoxetine (PROZAC) 20 MG capsule   3. Proteinuria, unspecified type  R80.9 Comprehensive Metabolic Panel     Creatinine Clearance, Urine, 24 HR     Protein, 24 Hr Urine   4.  Fungal rash of torso  B36.9 clotrimazole-betamethasone (LOTRISONE) 1-0.05 % lotion       PLAN:    Nae Meyers: Rash (rash in pelvic area ); Depression (having alot of depression due to rash , itching , burning ); and Urinary Retention (seems like she cant urinate )  review labs       Diagnosis and orders for this visit are above.

## 2020-08-06 RX ORDER — NYSTATIN 100000 [USP'U]/G
POWDER TOPICAL
Qty: 60 G | Refills: 0 | Status: SHIPPED | OUTPATIENT
Start: 2020-08-06 | End: 2021-01-04 | Stop reason: ALTCHOICE

## 2020-08-06 RX ORDER — DOXYCYCLINE HYCLATE 100 MG
TABLET ORAL
Qty: 20 TABLET | Refills: 0 | Status: SHIPPED | OUTPATIENT
Start: 2020-08-06 | End: 2021-01-04 | Stop reason: ALTCHOICE

## 2020-09-07 RX ORDER — FLUOXETINE HYDROCHLORIDE 20 MG/1
CAPSULE ORAL
Qty: 30 CAPSULE | Refills: 0 | OUTPATIENT
Start: 2020-09-07

## 2020-09-08 RX ORDER — QUETIAPINE FUMARATE 50 MG/1
TABLET, FILM COATED ORAL
Qty: 180 TABLET | Refills: 0 | Status: SHIPPED | OUTPATIENT
Start: 2020-09-08 | End: 2020-11-30

## 2020-09-11 DIAGNOSIS — R80.9 PROTEINURIA, UNSPECIFIED TYPE: ICD-10-CM

## 2020-09-11 LAB
24HR URINE VOLUME (ML): 350 ML
CREAT SERPL-MCNC: 0.8 MG/DL
CREATININE 24 HOUR URINE: 0.5 G/24HR (ref 1–2)
CREATININE CLEARANCE: 295 ML/MIN (ref 71–151)
Lab: 24 HR
PROTEIN 24 HOUR URINE: 42 MG/24HR (ref 50–100)

## 2020-09-21 ENCOUNTER — TELEPHONE (OUTPATIENT)
Dept: FAMILY MEDICINE CLINIC | Age: 45
End: 2020-09-21

## 2020-09-21 NOTE — TELEPHONE ENCOUNTER
Nae C/O SOB . She does several breathing treatments through the night and during the day uses cough medicine . She is wanting to see a pulmonologist .     I explained we need to order PFT first then refer. She is agreeable to this.

## 2020-09-23 ENCOUNTER — TELEPHONE (OUTPATIENT)
Dept: FAMILY MEDICINE CLINIC | Age: 45
End: 2020-09-23

## 2020-09-23 NOTE — TELEPHONE ENCOUNTER
Qi Orantes is needing albuterol inhaler and albuterol for breathing machine called into [facility] . I have her scheduled for the PFT on October 8th . She is to be Covid Tested first on October 3rd .

## 2020-09-24 RX ORDER — ALBUTEROL SULFATE 1.25 MG/3ML
1 SOLUTION RESPIRATORY (INHALATION) EVERY 6 HOURS PRN
Qty: 360 ML | Refills: 3 | Status: SHIPPED | OUTPATIENT
Start: 2020-09-24 | End: 2022-01-11 | Stop reason: SDUPTHER

## 2020-09-24 RX ORDER — ALBUTEROL SULFATE 90 UG/1
2 AEROSOL, METERED RESPIRATORY (INHALATION) EVERY 6 HOURS PRN
Qty: 1 INHALER | Refills: 3 | Status: SHIPPED | OUTPATIENT
Start: 2020-09-24 | End: 2020-12-21 | Stop reason: SDUPTHER

## 2020-10-22 RX ORDER — ALBUTEROL SULFATE 90 UG/1
2 AEROSOL, METERED RESPIRATORY (INHALATION) 4 TIMES DAILY PRN
Qty: 1 INHALER | Refills: 5 | Status: SHIPPED | OUTPATIENT
Start: 2020-10-22 | End: 2021-03-14 | Stop reason: SDUPTHER

## 2020-11-09 ENCOUNTER — TELEPHONE (OUTPATIENT)
Dept: FAMILY MEDICINE CLINIC | Age: 45
End: 2020-11-09

## 2020-11-09 NOTE — TELEPHONE ENCOUNTER
Patient states that she was called today by [facility] and was told by [facility] that since they haven't received her records from her pain management in [facility]. Patient was advised that her PCP was out of the office and that the other providers in the clinic did not have openings. Patient was offered Urgent care and ER and refused.

## 2020-11-10 ENCOUNTER — TELEPHONE (OUTPATIENT)
Dept: FAMILY MEDICINE CLINIC | Age: 45
End: 2020-11-10

## 2020-11-10 NOTE — TELEPHONE ENCOUNTER
Attempted to send text 2 x via doxy. me for scheduled virtual visit today. This was originally set up via [address], but pt contacted pre service and said she couldn't access [employer] with her phone. Pt did not check in. Attempted to call telephone contact ([phone]) 2 x with no answer and no voicemail. Unable to leave VM message. Pt welcome to reschedule if needed. Unsure if this was a technical error on the part of pt's technology or pt simply did not show.

## 2020-11-11 NOTE — TELEPHONE ENCOUNTER
Is this concerning pain meds? I would [facility] and see what the hold up is.  Mk Matias

## 2020-11-23 ENCOUNTER — TELEPHONE (OUTPATIENT)
Dept: FAMILY MEDICINE CLINIC | Age: 45
End: 2020-11-23

## 2020-11-23 RX ORDER — TOBRAMYCIN 3 MG/ML
1 SOLUTION/ DROPS OPHTHALMIC EVERY 4 HOURS
Qty: 1 BOTTLE | Refills: 0 | Status: SHIPPED | OUTPATIENT
Start: 2020-11-23 | End: 2020-12-03

## 2020-11-23 RX ORDER — DOXYCYCLINE HYCLATE 100 MG
100 TABLET ORAL 2 TIMES DAILY
Qty: 28 TABLET | Refills: 0 | Status: SHIPPED | OUTPATIENT
Start: 2020-11-23 | End: 2020-12-14 | Stop reason: SDUPTHER

## 2020-11-30 RX ORDER — QUETIAPINE FUMARATE 50 MG/1
TABLET, FILM COATED ORAL
Qty: 180 TABLET | Refills: 0 | Status: SHIPPED | OUTPATIENT
Start: 2020-11-30 | End: 2020-12-23 | Stop reason: SINTOL

## 2020-12-14 ENCOUNTER — OFFICE VISIT (OUTPATIENT)
Dept: FAMILY MEDICINE CLINIC | Age: 45
End: 2020-12-14
Payer: MEDICARE

## 2020-12-14 ENCOUNTER — TELEPHONE (OUTPATIENT)
Dept: OTOLARYNGOLOGY | Age: 45
End: 2020-12-14

## 2020-12-14 PROCEDURE — 99214 OFFICE O/P EST MOD 30 MIN: CPT | Performed by: NURSE PRACTITIONER

## 2020-12-14 PROCEDURE — G8428 CUR MEDS NOT DOCUMENT: HCPCS | Performed by: NURSE PRACTITIONER

## 2020-12-14 PROCEDURE — 4004F PT TOBACCO SCREEN RCVD TLK: CPT | Performed by: NURSE PRACTITIONER

## 2020-12-14 PROCEDURE — G8420 CALC BMI NORM PARAMETERS: HCPCS | Performed by: NURSE PRACTITIONER

## 2020-12-14 PROCEDURE — G8484 FLU IMMUNIZE NO ADMIN: HCPCS | Performed by: NURSE PRACTITIONER

## 2020-12-14 RX ORDER — NYSTATIN 100000 U/G
OINTMENT TOPICAL
Qty: 30 G | Refills: 0 | Status: SHIPPED | OUTPATIENT
Start: 2020-12-14 | End: 2020-12-21 | Stop reason: SDUPTHER

## 2020-12-14 RX ORDER — DOXYCYCLINE HYCLATE 100 MG
100 TABLET ORAL 2 TIMES DAILY
Qty: 28 TABLET | Refills: 0 | Status: SHIPPED | OUTPATIENT
Start: 2020-12-14 | End: 2020-12-21 | Stop reason: SDUPTHER

## 2020-12-14 ASSESSMENT — ENCOUNTER SYMPTOMS
TROUBLE SWALLOWING: 0
COUGH: 0
CONSTIPATION: 1
SHORTNESS OF BREATH: 1
COLOR CHANGE: 1
ABDOMINAL PAIN: 1
EYE ITCHING: 1
EYE REDNESS: 1

## 2020-12-14 NOTE — TELEPHONE ENCOUNTER
Called to schedule an appointment from a referral. Patient answered and requested to be called back at a later time.

## 2020-12-14 NOTE — PROGRESS NOTES
2020    TELEHEALTH EVALUATION -- Audio/Visual (During HGTGN-37 public health emergency)    HPI:    Yue Sharma (:  1975) has requested an audio/video evaluation for the following concern(s):    Had allergy pop up again the eyes are red and swollen. Lymph nodes swelling in the neck area She feels bad   has not been to get to  Pulmonolgist. Feels like something is Wrong  States she feels exhausted doing simple chores that she did before without difficulty. Now has to rest.   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 Protein showing up in urine Creatine slightly above normal the 24 hour was low on Protein. Review of Systems   Constitutional: Positive for fever and unexpected weight change. HENT: Negative for congestion and trouble swallowing. Eyes: Positive for redness and itching. Respiratory: Positive for shortness of breath. Negative for cough. Cardiovascular: Negative for chest pain and leg swelling. Gastrointestinal: Positive for abdominal pain (swelling) and constipation. Genitourinary: Positive for difficulty urinating. Negative for frequency. Musculoskeletal: Positive for arthralgias. Negative for joint swelling. Skin: Positive for color change and rash. Neurological: Negative for dizziness and speech difficulty. Psychiatric/Behavioral: Positive for sleep disturbance. The patient is nervous/anxious. Prior to Visit Medications    Medication Sig Taking? Authorizing Provider   doxycycline hyclate (VIBRA-TABS) 100 MG tablet Take 1 tablet by mouth 2 times daily for 14 days Yes Linde Rubinstein, APRN   nystatin (MYCOSTATIN) 236614 UNIT/GM ointment Apply topically 2 times daily.  Pelvic area Yes Linde Rubinstein, APRN   QUEtiapine (SEROQUEL) 50 MG tablet Take 1 tablet by mouth twice daily  Linde Rubinstein, APRN   fluticasone-salmeterol (ADVAIR HFA) 115-21 MCG/ACT inhaler Inhale 2 puffs into the lungs 2 times daily  MELIDA Mac   albuterol sulfate HFA (VENTOLIN HFA) 108 (90 Base) MCG/ACT inhaler Inhale 2 puffs into the lungs 4 times daily as needed for Wheezing  Jude FlynnMELIDA espinoza   albuterol sulfate HFA (PROVENTIL HFA) 108 (90 Base) MCG/ACT inhaler Inhale 2 puffs into the lungs every 6 hours as needed for Wheezing  MELIDA Mac   albuterol (ACCUNEB) 1.25 MG/3ML nebulizer solution Inhale 3 mLs into the lungs every 6 hours as needed for Wheezing  MELIDA Mac   NYSTATIN 564696 UNIT/GM powder APPLY TO ABDOMEN THREE TIMES DAILY  MELIDA Mac   doxycycline hyclate (VIBRA-TABS) 100 MG tablet Take 1 tablet by mouth twice daily for 10 days  MELIDA Mac   FLUoxetine (PROZAC) 20 MG capsule Take 1 capsule by mouth daily  MELIDA Mac   clotrimazole-betamethasone (LOTRISONE) 1-0.05 % lotion Apply topically 2 times daily. In the groin  MELIDA Mac   nystatin (MYCOSTATIN) 917761 UNIT/GM ointment Apply topically 2 times daily. MELIDA Mac   cloNIDine (CATAPRES) 0.1 MG tablet Take 0.1 mg by mouth 2 times daily  Historical Provider, MD   oxyCODONE-acetaminophen (PERCOCET) 7.5-325 MG per tablet Take 1 tablet by mouth every 4 hours as needed for Pain.   Historical Provider, MD   atorvastatin (LIPITOR) 40 MG tablet Take 40 mg by mouth daily  Historical Provider, MD   ibuprofen (ADVIL;MOTRIN) 800 MG tablet Take 800 mg by mouth every 6 hours as needed for Pain  Historical Provider, MD   atenolol (TENORMIN) 50 MG tablet Take 50 mg by mouth daily  Historical Provider, MD   furosemide (LASIX) 20 MG tablet Take 20 mg by mouth 2 times daily  Historical Provider, MD   sennosides-docusate sodium (SENOKOT-S) 8.6-50 MG tablet Take 1 tablet by mouth daily  Historical Provider, MD   guaiFENesin (EXPECTORANT COUGH CONTROL PO) Take by mouth  Historical Provider, MD   vitamin D3 (CHOLECALCIFEROL) 10 MCG (400 UNIT) TABS tablet Take 400 Units by mouth daily Historical Provider, MD   vitamin B-12 (CYANOCOBALAMIN) 1000 MCG tablet Take 1,000 mcg by mouth daily  Historical Provider, MD   melatonin 3 MG TABS tablet Take 3 mg by mouth daily  Historical Provider, MD       Social History     Tobacco Use    Smoking status: Current Every Day Smoker     Packs/day: 1.00     Types: Cigarettes     Start date: 200    Smokeless tobacco: Never Used   Substance Use Topics    Alcohol use: Never     Frequency: Never     Binge frequency: Never    Drug use: Not on file        Allergies   Allergen Reactions    Ceclor [Cefaclor] Swelling    Penicillins Hives    Sulfa Antibiotics    ,   Past Medical History:   Diagnosis Date    [facility] ([facility]     cervical, lymphoma    Congenital heart disease     MRSA infection     Stroke ([facility]    ,   Past Surgical History:   Procedure Laterality Date    CARDIAC SURGERY      PARTIAL HYSTERECTOMY         PHYSICAL EXAMINATION:  [ INSTRUCTIONS:  \"[x]\" Indicates a positive item  \"[]\" Indicates a negative item  -- DELETE ALL ITEMS NOT EXAMINED]  Vital Signs: (As obtained by patient/caregiver or practitioner observation)    Blood pressure-  Heart rate-    Respiratory rate-    Temperature-  Pulse oximetry-     Constitutional: [] Appears well-developed and well-nourished [] No apparent distress      [] Abnormal-   Mental status  [x] Alert and awake  [x] Oriented to person/place/time [x]Able to follow commands      Eyes:  EOM    [x]  Normal  [] Abnormal-  Sclera  [x]  Normal  [] Abnormal -         Discharge [x]  None visible  [] Abnormal -    HENT:   [x] Normocephalic, atraumatic.   [] Abnormal   [x] Mouth/Throat: Mucous membranes are moist.     External Ears [x] Normal  [] Abnormal-     Neck: [x] No visualized mass     Pulmonary/Chest: [x] Respiratory effort normal.  [x] No visualized signs of difficulty breathing or respiratory distress        [] Abnormal-      Musculoskeletal:   [] Normal gait with no signs of ataxia         [x] Normal range of motion of neck        [] Abnormal-       Neurological:        [x] No Facial Asymmetry (Cranial nerve 7 motor function) (limited exam to video visit)          [x] No gaze palsy        [] Abnormal-         Skin:        [] No significant exanthematous lesions or discoloration noted on facial skin         [x] Abnormal- red swollen eyes          Psychiatric:       [] Normal Affect [x] No Hallucinations        [x] Abnormal- anxious  Other pertinent observable physical exam findings-     ASSESSMENT/PLAN:  1. Fever, unspecified fever cause    - YURY Screen with Reflex; Future  - Sedimentation Rate; Future  - C-Reactive Protein; Future  - CBC Auto Differential; Future    2. Lymphadenopathy    - YURY Screen with Reflex; Future  - Sedimentation Rate; Future  - C-Reactive Protein; Future  - Millicent Hardwick MD, Otolaryngology, [facility] mound  - CT ABDOMEN PELVIS W IV CONTRAST Additional Contrast? Radiologist Recommendation; Future    3. Proteinuria, unspecified type    - Urinalysis Reflex to Culture; Future  - Comprehensive Metabolic Panel; Future  - CT ABDOMEN PELVIS W IV CONTRAST Additional Contrast? Radiologist Recommendation; Future    4. Lymphoma of intra-abdominal lymph nodes, unspecified lymphoma type ([facility]    - CT ABDOMEN PELVIS W IV CONTRAST Additional Contrast? Radiologist Recommendation; Future    5. Acute bacterial conjunctivitis of both eyes    - doxycycline hyclate (VIBRA-TABS) 100 MG tablet; Take 1 tablet by mouth 2 times daily for 14 days  Dispense: 28 tablet; Refill: 0    6. Inflammation of clitoris    - doxycycline hyclate (VIBRA-TABS) 100 MG tablet; Take 1 tablet by mouth 2 times daily for 14 days  Dispense: 28 tablet; Refill: 0      No follow-ups on file. Antonino Sanchez is a 54 y.o. female being evaluated by a Virtual Visit (video visit) encounter to address concerns as mentioned above. A caregiver was present when appropriate.  Due to this being a TeleHealth encounter (During MAJ-02 public health emergency), evaluation of the following organ systems was limited: Vitals/Constitutional/EENT/Resp/CV/GI//MS/Neuro/Skin/Heme-Lymph-Imm. Pursuant to the emergency declaration under the [address] [place] [address], [address] [place] [address] and the [employer] Act, this Virtual Visit was conducted with patient's (and/or legal guardian's) consent, to reduce the patient's risk of exposure to COVID-19 and provide necessary medical care. The patient (and/or legal guardian) has also been advised to contact this office for worsening conditions or problems, and seek emergency medical treatment and/or call 911 if deemed necessary. Patient identification was verified at the start of the visit: Yes    Total time spent on this encounter: 25 minutes    Services were provided through a video synchronous discussion virtually to substitute for in-person clinic visit. Patient and provider were located at their individual homes. --MELIDA Schaeffer on 12/14/2020 at 7:29 PM    An electronic signature was used to authenticate this note.

## 2020-12-21 ENCOUNTER — OFFICE VISIT (OUTPATIENT)
Dept: FAMILY MEDICINE CLINIC | Age: 45
End: 2020-12-21
Payer: MEDICARE

## 2020-12-21 ENCOUNTER — HOSPITAL ENCOUNTER (OUTPATIENT)
Dept: GENERAL RADIOLOGY | Age: 45
Discharge: HOME | End: 2020-12-21
Payer: MEDICARE

## 2020-12-21 VITALS
SYSTOLIC BLOOD PRESSURE: 152 MMHG | DIASTOLIC BLOOD PRESSURE: 78 MMHG | HEART RATE: 80 BPM | TEMPERATURE: 98.9 F | BODY MASS INDEX: 25.79 KG/M2 | WEIGHT: 141 LBS | OXYGEN SATURATION: 98 %

## 2020-12-21 DIAGNOSIS — R50.9 FEVER, UNSPECIFIED FEVER CAUSE: ICD-10-CM

## 2020-12-21 DIAGNOSIS — R59.1 LYMPHADENOPATHY: ICD-10-CM

## 2020-12-21 DIAGNOSIS — R06.02 SHORTNESS OF BREATH: ICD-10-CM

## 2020-12-21 DIAGNOSIS — R80.9 PROTEINURIA, UNSPECIFIED TYPE: ICD-10-CM

## 2020-12-21 DIAGNOSIS — R18.8 OTHER ASCITES: ICD-10-CM

## 2020-12-21 LAB
ALBUMIN SERPL-MCNC: 4.6 G/DL (ref 3.5–5.2)
ALP BLD-CCNC: 89 U/L (ref 35–104)
ALT SERPL-CCNC: 19 U/L (ref 5–33)
ANION GAP SERPL CALCULATED.3IONS-SCNC: 14 MMOL/L (ref 7–19)
AST SERPL-CCNC: 22 U/L (ref 5–32)
BASOPHILS ABSOLUTE: 0.1 K/UL (ref 0–0.2)
BASOPHILS RELATIVE PERCENT: 0.9 % (ref 0–1)
BILIRUB SERPL-MCNC: 0.4 MG/DL (ref 0.2–1.2)
BILIRUBIN URINE: NEGATIVE
BLOOD, URINE: NEGATIVE
BUN BLDV-MCNC: 13 MG/DL (ref 6–20)
C-REACTIVE PROTEIN: 0.43 MG/DL (ref 0–0.5)
CALCIUM SERPL-MCNC: 9.7 MG/DL (ref 8.6–10)
CHLORIDE BLD-SCNC: 103 MMOL/L (ref 98–111)
CLARITY: CLEAR
CO2: 28 MMOL/L (ref 22–29)
COLOR: YELLOW
CREAT SERPL-MCNC: 1 MG/DL (ref 0.5–0.9)
EOSINOPHILS ABSOLUTE: 0.3 K/UL (ref 0–0.6)
EOSINOPHILS RELATIVE PERCENT: 3.9 % (ref 0–5)
GFR AFRICAN AMERICAN: >59
GFR NON-AFRICAN AMERICAN: 60
GLUCOSE BLD-MCNC: 92 MG/DL (ref 74–109)
GLUCOSE URINE: NEGATIVE MG/DL
HCT VFR BLD CALC: 44.4 % (ref 37–47)
HEMOGLOBIN: 14.2 G/DL (ref 12–16)
IMMATURE GRANULOCYTES #: 0 K/UL
KETONES, URINE: ABNORMAL MG/DL
LEUKOCYTE ESTERASE, URINE: NEGATIVE
LYMPHOCYTES ABSOLUTE: 2 K/UL (ref 1.1–4.5)
LYMPHOCYTES RELATIVE PERCENT: 29.8 % (ref 20–40)
MCH RBC QN AUTO: 32.6 PG (ref 27–31)
MCHC RBC AUTO-ENTMCNC: 32 G/DL (ref 33–37)
MCV RBC AUTO: 102.1 FL (ref 81–99)
MONOCYTES ABSOLUTE: 0.7 K/UL (ref 0–0.9)
MONOCYTES RELATIVE PERCENT: 10.7 % (ref 0–10)
NEUTROPHILS ABSOLUTE: 3.7 K/UL (ref 1.5–7.5)
NEUTROPHILS RELATIVE PERCENT: 54.6 % (ref 50–65)
NITRITE, URINE: NEGATIVE
PDW BLD-RTO: 13.2 % (ref 11.5–14.5)
PH UA: 7 (ref 5–8)
PLATELET # BLD: 279 K/UL (ref 130–400)
PMV BLD AUTO: 10.7 FL (ref 9.4–12.3)
POTASSIUM SERPL-SCNC: 4.4 MMOL/L (ref 3.5–5)
PRO-BNP: 1626 PG/ML (ref 0–450)
PROTEIN UA: NEGATIVE MG/DL
RBC # BLD: 4.35 M/UL (ref 4.2–5.4)
SEDIMENTATION RATE, ERYTHROCYTE: 19 MM/HR (ref 0–20)
SODIUM BLD-SCNC: 145 MMOL/L (ref 136–145)
SPECIFIC GRAVITY UA: 1.02 (ref 1–1.03)
TOTAL PROTEIN: 8.1 G/DL (ref 6.6–8.7)
UROBILINOGEN, URINE: 1 E.U./DL
WBC # BLD: 6.8 K/UL (ref 4.8–10.8)

## 2020-12-21 PROCEDURE — 71046 X-RAY EXAM CHEST 2 VIEWS: CPT

## 2020-12-21 PROCEDURE — G8484 FLU IMMUNIZE NO ADMIN: HCPCS | Performed by: NURSE PRACTITIONER

## 2020-12-21 PROCEDURE — 99213 OFFICE O/P EST LOW 20 MIN: CPT | Performed by: NURSE PRACTITIONER

## 2020-12-21 PROCEDURE — G8427 DOCREV CUR MEDS BY ELIG CLIN: HCPCS | Performed by: NURSE PRACTITIONER

## 2020-12-21 PROCEDURE — G8419 CALC BMI OUT NRM PARAM NOF/U: HCPCS | Performed by: NURSE PRACTITIONER

## 2020-12-21 PROCEDURE — 4004F PT TOBACCO SCREEN RCVD TLK: CPT | Performed by: NURSE PRACTITIONER

## 2020-12-21 PROCEDURE — 96372 THER/PROPH/DIAG INJ SC/IM: CPT | Performed by: NURSE PRACTITIONER

## 2020-12-21 RX ORDER — TRIAMCINOLONE ACETONIDE 40 MG/ML
40 INJECTION, SUSPENSION INTRA-ARTICULAR; INTRAMUSCULAR ONCE
Status: COMPLETED | OUTPATIENT
Start: 2020-12-21 | End: 2020-12-21

## 2020-12-21 RX ORDER — AZITHROMYCIN 250 MG/1
250 TABLET, FILM COATED ORAL SEE ADMIN INSTRUCTIONS
Qty: 6 TABLET | Refills: 0 | Status: SHIPPED | OUTPATIENT
Start: 2020-12-21 | End: 2020-12-26

## 2020-12-21 RX ORDER — DEXAMETHASONE SODIUM PHOSPHATE 10 MG/ML
4 INJECTION INTRAMUSCULAR; INTRAVENOUS ONCE
Status: COMPLETED | OUTPATIENT
Start: 2020-12-21 | End: 2020-12-21

## 2020-12-21 RX ORDER — LACTULOSE 10 G/15ML
10 SOLUTION ORAL EVERY EVENING
Qty: 450 ML | Refills: 1 | Status: SHIPPED | OUTPATIENT
Start: 2020-12-21 | End: 2021-01-11 | Stop reason: SDUPTHER

## 2020-12-21 RX ADMIN — DEXAMETHASONE SODIUM PHOSPHATE 4 MG: 10 INJECTION INTRAMUSCULAR; INTRAVENOUS at 15:48

## 2020-12-21 RX ADMIN — TRIAMCINOLONE ACETONIDE 40 MG: 40 INJECTION, SUSPENSION INTRA-ARTICULAR; INTRAMUSCULAR at 15:49

## 2020-12-21 ASSESSMENT — ENCOUNTER SYMPTOMS
ABDOMINAL DISTENTION: 1
COUGH: 1
SHORTNESS OF BREATH: 1
TROUBLE SWALLOWING: 0
CONSTIPATION: 1

## 2020-12-21 NOTE — PROGRESS NOTES
SUBJECTIVE:  SEE last OV note. [facility]ing needs labs and CT abdomen   Patient ID: Fidencio Cox is a 54 y.o. female. HPI:   Shortness of Breath  Associated symptoms include chest pain. Pertinent negatives include no fever. Pulmonology referral made and she put off didn't feel well but will call to reschedule. Eyes swelling and abdomen swelling no energy, not having Bm. Shortness of breath. Not sleeping good. Urine decreased. Has ENT scheduled   We have done a virtual visit of the current symptoms that she has I think what is new is the swelling around her eyes abdominal distention I have requested labs I have made appointments to her nose and throat I have made a pulmonology report referral however she canceled it because she did not feel very good I have stressed the importance of following through on the testing I do think something is wrong but I need her to follow through to see what is actually going on I need a CT of the abdomen today were going to do the chest x-ray get the labs and try and get the other scheduled  Past Medical History:   Diagnosis Date    Cancer ([facility]     cervical, lymphoma    Congenital heart disease     MRSA infection     Stroke [facility])       Prior to Visit Medications    Medication Sig Taking?  Authorizing Provider   lactulose (CHRONULAC) 10 GM/15ML solution Take 15 mLs by mouth every evening Yes MELIDA Mayo   azithromycin (ZITHROMAX) 250 MG tablet Take 1 tablet by mouth See Admin Instructions for 5 days 500mg on day 1 followed by 250mg on days 2 - 5 Yes MELIDA Mayo   QUEtiapine (SEROQUEL) 50 MG tablet Take 1 tablet by mouth twice daily Yes MELIDA Mayo   fluticasone-salmeterol (ADVAIR HFA) 115-21 MCG/ACT inhaler Inhale 2 puffs into the lungs 2 times daily Yes MELIDA Mayo   albuterol sulfate HFA (VENTOLIN HFA) 108 (90 Base) MCG/ACT inhaler Inhale 2 puffs into the lungs 4 times daily as needed for Wheezing Yes MELIDA Mayo  Sulfa Antibiotics        Review of Systems   Constitutional: Positive for fatigue. Negative for fever. HENT: Negative for congestion and trouble swallowing. Respiratory: Positive for cough and shortness of breath. Cardiovascular: Positive for chest pain and palpitations. Gastrointestinal: Positive for abdominal distention and constipation. Genitourinary: Positive for decreased urine volume and difficulty urinating. Musculoskeletal: Positive for arthralgias. Neurological: Positive for dizziness and weakness. Psychiatric/Behavioral: Positive for sleep disturbance. The patient is nervous/anxious. OBJECTIVE:    Physical Exam  Constitutional:       Appearance: She is well-developed. HENT:      Head: Normocephalic and atraumatic. Right Ear: Tympanic membrane, ear canal and external ear normal. No drainage or tenderness. No middle ear effusion. There is no impacted cerumen. Tympanic membrane is not injected or bulging. Left Ear: Tympanic membrane, ear canal and external ear normal. No drainage or tenderness. No middle ear effusion. There is no impacted cerumen. Tympanic membrane is not injected or bulging. Nose: Nose normal. No congestion or rhinorrhea. Right Sinus: No maxillary sinus tenderness or frontal sinus tenderness. Left Sinus: No maxillary sinus tenderness or frontal sinus tenderness. Mouth/Throat:      Lips: Pink. Mouth: Mucous membranes are moist. No oral lesions. Dentition: No gum lesions. Pharynx: Oropharynx is clear. No oropharyngeal exudate, posterior oropharyngeal erythema or uvula swelling. Tonsils: No tonsillar exudate or tonsillar abscesses. Eyes:      General: Lids are normal.         Right eye: No discharge. Left eye: No discharge. Extraocular Movements: Extraocular movements intact. Conjunctiva/sclera: Conjunctivae normal.      Right eye: Right conjunctiva is not injected. No exudate. Left eye: Left conjunctiva is not injected. No exudate. Comments: Eyelids are swollen red around each eye socket into the bridge of the nose with lines indention   Neck:      Musculoskeletal: Normal range of motion and neck supple. Normal range of motion. No neck rigidity. Cardiovascular:      Rate and Rhythm: Normal rate and regular rhythm. Heart sounds: Normal heart sounds. No murmur. Pulmonary:      Effort: Pulmonary effort is normal. No respiratory distress. Breath sounds: Decreased breath sounds present. No wheezing, rhonchi or rales. Abdominal:      General: Bowel sounds are increased. There is distension. Palpations: Abdomen is soft. Musculoskeletal: Normal range of motion. Right lower leg: No edema. Left lower leg: No edema. Lymphadenopathy:      Cervical: No cervical adenopathy. Right cervical: No superficial cervical adenopathy. Left cervical: No superficial cervical adenopathy. Skin:     General: Skin is warm and dry. Coloration: Skin is not pale. Neurological:      Mental Status: She is alert and oriented to person, place, and time. Psychiatric:         Attention and Perception: Attention normal.         Mood and Affect: Mood normal.         Behavior: Behavior normal. Behavior is cooperative. BP (!) 152/78 (Site: Left Upper Arm, Position: Sitting, Cuff Size: Medium Adult)   Pulse 80   Temp 98.9 °F (37.2 °C) (Temporal)   Wt 141 lb (64 kg)   SpO2 98%   BMI 25.79 kg/m²      ASSESSMENT:      ICD-10-CM    1. Shortness of breath  R06.02 XR CHEST STANDARD (2 VW)   2. Constipation, unspecified constipation type  K59.00 lactulose (CHRONULAC) 10 GM/15ML solution   3. Hives  L50.9 dexamethasone (DECADRON) injection 4 mg     triamcinolone acetonide (KENALOG-40) injection 40 mg   4.  Acute bronchiolitis due to unspecified organism  J21.9 azithromycin (ZITHROMAX) 250 MG tablet       PLAN: Chelsie Meyers: Eye Swelling (eyes are swollen red and inflamed ), Shortness of Breath (having alot of shortness of breath ), and Swelling (she has been gaining weight )  See previous labs ordered and also CT referral to ENT    Diagnosis and orders for this visit are above.

## 2020-12-23 ENCOUNTER — OFFICE VISIT (OUTPATIENT)
Dept: FAMILY MEDICINE CLINIC | Age: 45
End: 2020-12-23
Payer: MEDICARE

## 2020-12-23 VITALS
HEART RATE: 75 BPM | OXYGEN SATURATION: 98 % | TEMPERATURE: 96.8 F | RESPIRATION RATE: 24 BRPM | WEIGHT: 143 LBS | BODY MASS INDEX: 26.16 KG/M2 | SYSTOLIC BLOOD PRESSURE: 128 MMHG | DIASTOLIC BLOOD PRESSURE: 72 MMHG

## 2020-12-23 PROCEDURE — G8427 DOCREV CUR MEDS BY ELIG CLIN: HCPCS | Performed by: NURSE PRACTITIONER

## 2020-12-23 PROCEDURE — 4004F PT TOBACCO SCREEN RCVD TLK: CPT | Performed by: NURSE PRACTITIONER

## 2020-12-23 PROCEDURE — G8484 FLU IMMUNIZE NO ADMIN: HCPCS | Performed by: NURSE PRACTITIONER

## 2020-12-23 PROCEDURE — G8419 CALC BMI OUT NRM PARAM NOF/U: HCPCS | Performed by: NURSE PRACTITIONER

## 2020-12-23 PROCEDURE — 99213 OFFICE O/P EST LOW 20 MIN: CPT | Performed by: NURSE PRACTITIONER

## 2020-12-23 RX ORDER — DEXTROMETHORPHAN HYDROBROMIDE AND PROMETHAZINE HYDROCHLORIDE 15; 6.25 MG/5ML; MG/5ML
5 SYRUP ORAL 4 TIMES DAILY PRN
Qty: 150 ML | Refills: 0 | Status: SHIPPED | OUTPATIENT
Start: 2020-12-23 | End: 2021-02-08 | Stop reason: SDUPTHER

## 2020-12-23 RX ORDER — TRAZODONE HYDROCHLORIDE 50 MG/1
50 TABLET ORAL NIGHTLY PRN
Qty: 30 TABLET | Refills: 5 | Status: SHIPPED | OUTPATIENT
Start: 2020-12-23 | End: 2021-01-12 | Stop reason: SINTOL

## 2020-12-23 ASSESSMENT — ENCOUNTER SYMPTOMS
ABDOMINAL DISTENTION: 1
TROUBLE SWALLOWING: 0
COUGH: 1
SHORTNESS OF BREATH: 1
BACK PAIN: 1
CONSTIPATION: 1
DIARRHEA: 0
SORE THROAT: 1

## 2020-12-23 NOTE — PROGRESS NOTES
fluticasone-salmeterol (ADVAIR HFA) 115-21 MCG/ACT inhaler Inhale 2 puffs into the lungs 2 times daily Yes MELIDA Sanchez   albuterol sulfate HFA (VENTOLIN HFA) 108 (90 Base) MCG/ACT inhaler Inhale 2 puffs into the lungs 4 times daily as needed for Wheezing Yes MELIDA Sanchez   albuterol (ACCUNEB) 1.25 MG/3ML nebulizer solution Inhale 3 mLs into the lungs every 6 hours as needed for Wheezing Yes MELIDA Sanchez   NYSTATIN 383398 UNIT/GM powder APPLY TO ABDOMEN THREE TIMES DAILY Yes MELIDA Sanchez   doxycycline hyclate (VIBRA-TABS) 100 MG tablet Take 1 tablet by mouth twice daily for 10 days Yes MELIDA Sanchez   FLUoxetine (PROZAC) 20 MG capsule Take 1 capsule by mouth daily Yes MELIDA Sanchez   clotrimazole-betamethasone (LOTRISONE) 1-0.05 % lotion Apply topically 2 times daily. In the groin Yes MELIDA Sanchez   nystatin (MYCOSTATIN) 229597 UNIT/GM ointment Apply topically 2 times daily. Yes MELIDA Sanchez   cloNIDine (CATAPRES) 0.1 MG tablet Take 0.1 mg by mouth 2 times daily Yes Historical Provider, MD   oxyCODONE-acetaminophen (PERCOCET) 7.5-325 MG per tablet Take 1 tablet by mouth every 4 hours as needed for Pain.  Yes Historical Provider, MD   atorvastatin (LIPITOR) 40 MG tablet Take 40 mg by mouth daily Yes Historical Provider, MD   ibuprofen (ADVIL;MOTRIN) 800 MG tablet Take 800 mg by mouth every 6 hours as needed for Pain Yes Historical Provider, MD   atenolol (TENORMIN) 50 MG tablet Take 50 mg by mouth daily Yes Historical Provider, MD   furosemide (LASIX) 20 MG tablet Take 20 mg by mouth 2 times daily Yes Historical Provider, MD   sennosides-docusate sodium (SENOKOT-S) 8.6-50 MG tablet Take 1 tablet by mouth daily Yes Historical Provider, MD   guaiFENesin (EXPECTORANT COUGH CONTROL PO) Take by mouth Yes Historical Provider, MD   vitamin D3 (CHOLECALCIFEROL) 10 MCG (400 UNIT) TABS tablet Take 400 Units by mouth daily Yes Historical Provider, MD vitamin B-12 (CYANOCOBALAMIN) 1000 MCG tablet Take 1,000 mcg by mouth daily Yes Historical Provider, MD   melatonin 3 MG TABS tablet Take 3 mg by mouth daily Yes Historical Provider, MD      Allergies   Allergen Reactions    Ceclor [Cefaclor] Swelling    Penicillins Hives    Sulfa Antibiotics        Review of Systems   Constitutional: Positive for fever. HENT: Positive for sore throat. Negative for trouble swallowing. Respiratory: Positive for cough and shortness of breath. Cardiovascular: Positive for chest pain and palpitations. Gastrointestinal: Positive for abdominal distention and constipation. Negative for diarrhea. Genitourinary: Positive for decreased urine volume and difficulty urinating. Musculoskeletal: Positive for arthralgias and back pain. Neurological: Positive for dizziness and headaches. Negative for syncope. Psychiatric/Behavioral: Positive for sleep disturbance. The patient is nervous/anxious. OBJECTIVE:    Physical Exam  Constitutional:       Appearance: Normal appearance. She is well-developed and well-groomed. HENT:      Head: Normocephalic and atraumatic. Right Ear: Tympanic membrane, ear canal and external ear normal. There is no impacted cerumen. Left Ear: Tympanic membrane, ear canal and external ear normal. There is no impacted cerumen. Nose: Nose normal.      Mouth/Throat:      Lips: Pink. Mouth: Mucous membranes are moist.      Dentition: Normal dentition. Pharynx: Oropharynx is clear. Uvula midline. Eyes:      General: Lids are normal.         Right eye: No discharge. Left eye: No discharge. Extraocular Movements: Extraocular movements intact. Conjunctiva/sclera: Conjunctivae normal.      Right eye: Right conjunctiva is not injected. Left eye: Left conjunctiva is not injected. Pupils: Pupils are equal, round, and reactive to light.       Comments: Swelling around eyelids have gone down   Neck: Musculoskeletal: Full passive range of motion without pain, normal range of motion and neck supple. Thyroid: No thyromegaly. Vascular: No carotid bruit or JVD. Cardiovascular:      Rate and Rhythm: Normal rate and regular rhythm. Pulses: Normal pulses. Carotid pulses are 2+ on the right side and 2+ on the left side. Radial pulses are 2+ on the right side and 2+ on the left side. Heart sounds: Normal heart sounds, S1 normal and S2 normal. No murmur. Pulmonary:      Effort: Pulmonary effort is normal.      Breath sounds: Examination of the left-lower field reveals rales. Rales present. Abdominal:      General: Bowel sounds are normal. There is no distension or abdominal bruit. Palpations: Abdomen is soft. There is no mass. Hernia: No hernia is present. Musculoskeletal: Normal range of motion. Right lower leg: No edema. Left lower leg: No edema. Lymphadenopathy:      Cervical: No cervical adenopathy. Right cervical: No superficial cervical adenopathy. Left cervical: No superficial cervical adenopathy. Upper Body:      Right upper body: No supraclavicular adenopathy. Left upper body: No supraclavicular adenopathy. Skin:     General: Skin is warm and dry. Coloration: Skin is not pale. Findings: No lesion or rash. Nails: There is no clubbing. Neurological:      Mental Status: She is alert and oriented to person, place, and time. Motor: No weakness or tremor. Coordination: Coordination normal.      Deep Tendon Reflexes: Reflexes are normal and symmetric. Psychiatric:         Attention and Perception: Attention normal.         Mood and Affect: Mood normal.         Speech: Speech normal.         Behavior: Behavior normal. Behavior is cooperative. Thought Content:  Thought content normal.         Cognition and Memory: Cognition and memory normal.         Judgment: Judgment normal. /72 (Site: Left Upper Arm, Position: Sitting, Cuff Size: Medium Adult)   Pulse 75   Temp 96.8 °F (36 °C) (Temporal)   Resp 24   Wt 143 lb (64.9 kg)   BMI 26.16 kg/m²      ASSESSMENT:      ICD-10-CM    1. Chronic diastolic congestive heart failure (HCC)  I50.32 Echo 2d w doppler w color w contrast   2. Hx of aortic valve replacement  Z95.2 Echo 2d w doppler w color w contrast   3. Insomnia due to medical condition  G47.01 traZODone (DESYREL) 50 MG tablet   4. Pneumonia due to infectious organism, unspecified laterality, unspecified part of lung  J18.9 promethazine-dextromethorphan (PROMETHAZINE-DM) 6.25-15 MG/5ML syrup       PLAN:    Nae Meyers: Pneumonia (follow up / still short of breath/ eyes are not as swollen / coughing alot ) and Insomnia (wants to discuss seroquel )  Take lasix 2 times a day for 3 days. Will need pulmonolgist number again. Diagnosis and orders for this visit are above.

## 2020-12-24 LAB — ANA IGG, ELISA: NORMAL

## 2020-12-30 ENCOUNTER — HOSPITAL ENCOUNTER (OUTPATIENT)
Dept: GENERAL RADIOLOGY | Age: 45
Discharge: HOME | End: 2020-12-30
Payer: MEDICARE

## 2020-12-30 PROCEDURE — 74177 CT ABD & PELVIS W/CONTRAST: CPT

## 2020-12-30 PROCEDURE — 6360000004 HC RX CONTRAST MEDICATION: Performed by: NURSE PRACTITIONER

## 2020-12-30 RX ADMIN — IOPAMIDOL 75 ML: 755 INJECTION, SOLUTION INTRAVENOUS at 09:54

## 2021-01-04 ENCOUNTER — OFFICE VISIT (OUTPATIENT)
Dept: ENT CLINIC | Age: 46
End: 2021-01-04
Payer: MEDICARE

## 2021-01-04 ENCOUNTER — TELEPHONE (OUTPATIENT)
Dept: ADMINISTRATIVE | Age: 46
End: 2021-01-04

## 2021-01-04 VITALS
WEIGHT: 140 LBS | DIASTOLIC BLOOD PRESSURE: 76 MMHG | SYSTOLIC BLOOD PRESSURE: 138 MMHG | BODY MASS INDEX: 25.76 KG/M2 | HEIGHT: 62 IN

## 2021-01-04 DIAGNOSIS — R59.0 LYMPHADENOPATHY, ANTERIOR CERVICAL: Primary | ICD-10-CM

## 2021-01-04 PROCEDURE — G8427 DOCREV CUR MEDS BY ELIG CLIN: HCPCS | Performed by: PHYSICIAN ASSISTANT

## 2021-01-04 PROCEDURE — G8484 FLU IMMUNIZE NO ADMIN: HCPCS | Performed by: PHYSICIAN ASSISTANT

## 2021-01-04 PROCEDURE — 4004F PT TOBACCO SCREEN RCVD TLK: CPT | Performed by: PHYSICIAN ASSISTANT

## 2021-01-04 PROCEDURE — G8419 CALC BMI OUT NRM PARAM NOF/U: HCPCS | Performed by: PHYSICIAN ASSISTANT

## 2021-01-04 PROCEDURE — 99202 OFFICE O/P NEW SF 15 MIN: CPT | Performed by: PHYSICIAN ASSISTANT

## 2021-01-04 NOTE — PROGRESS NOTES
Ms. Rama Lundberg is a very pleasant 70-year-old  female that was referred by Mario Cristina due to problems with lymphadenopathy to the right anterior cervical region. She reported this was first noted about a month ago and has a history approximately 20 years ago for lymphoma. Currently she denies any symptoms of night sweats or weight changes. Currently she reports that the area has pretty much resolved after being treated for pneumonia. Overall she has a lot going on due to cardiomyopathy as well as atherosclerotic peripheral vascular disease. Physical examination revealed the patient to have no evidence of lymphadenopathy to palpation to the anterior and posterior cervical region. Examination of the ears demonstrated normal-appearing TMs bilaterally. Oral exam demonstrated no masses or erythema to the posterior pharyngeal wall. Impression: No evidence of lymphadenopathy to the cervical region    Plan: I advised the patient to follow-up with me on an as needed basis due to a negative exam.  She is reminded to call she has recurrence or has any further questions.       Electronically signed by Rayne Cadena PA-C on 1/4/21 at 11:09 AM CST

## 2021-01-04 NOTE — TELEPHONE ENCOUNTER
Pt.'s mychart acct. Is showing appt. Today at 11:00. Pt. Is unable to do due to being at another dr. Mirza Butler. She asked to be called this afternoon to schedule/reschedule. Please adivse.

## 2021-01-05 ENCOUNTER — HOSPITAL ENCOUNTER (OUTPATIENT)
Dept: NON INVASIVE DIAGNOSTICS | Age: 46
Discharge: HOME | End: 2021-01-05
Payer: MEDICARE

## 2021-01-05 ENCOUNTER — TELEPHONE (OUTPATIENT)
Dept: FAMILY MEDICINE CLINIC | Age: 46
End: 2021-01-05

## 2021-01-05 DIAGNOSIS — I50.32 CHRONIC DIASTOLIC CONGESTIVE HEART FAILURE (HCC): ICD-10-CM

## 2021-01-05 DIAGNOSIS — Z95.2 HX OF AORTIC VALVE REPLACEMENT: ICD-10-CM

## 2021-01-05 LAB
LV EF: 50 %
LVEF MODALITY: NORMAL

## 2021-01-05 PROCEDURE — 93306 TTE W/DOPPLER COMPLETE: CPT

## 2021-01-05 RX ORDER — FUROSEMIDE 20 MG/1
20 TABLET ORAL 2 TIMES DAILY
Qty: 60 TABLET | Refills: 5 | Status: SHIPPED | OUTPATIENT
Start: 2021-01-05 | End: 2021-02-08 | Stop reason: SDUPTHER

## 2021-01-05 NOTE — TELEPHONE ENCOUNTER
Tres Odonnell is needing her lasix refilled . She also said that the Trazadone was giving her really scary nightmares and she was waking up not being able to go back to sleep . So she stopped it . She went and had her 2 D echo today and on the 7th she meets with vascular.

## 2021-01-07 ENCOUNTER — VIRTUAL VISIT (OUTPATIENT)
Dept: VASCULAR SURGERY | Age: 46
End: 2021-01-07
Payer: MEDICARE

## 2021-01-07 DIAGNOSIS — I70.213 ATHEROSCLER OF NATIVE ARTERY OF BOTH LEGS WITH INTERMIT CLAUDICATION (HCC): Primary | ICD-10-CM

## 2021-01-07 PROCEDURE — 4004F PT TOBACCO SCREEN RCVD TLK: CPT | Performed by: NURSE PRACTITIONER

## 2021-01-07 PROCEDURE — G8419 CALC BMI OUT NRM PARAM NOF/U: HCPCS | Performed by: NURSE PRACTITIONER

## 2021-01-07 PROCEDURE — G8484 FLU IMMUNIZE NO ADMIN: HCPCS | Performed by: NURSE PRACTITIONER

## 2021-01-07 PROCEDURE — 99204 OFFICE O/P NEW MOD 45 MIN: CPT | Performed by: NURSE PRACTITIONER

## 2021-01-07 PROCEDURE — G8427 DOCREV CUR MEDS BY ELIG CLIN: HCPCS | Performed by: NURSE PRACTITIONER

## 2021-01-07 NOTE — PROGRESS NOTES
Minna Nurse (:  1975) is a 54 y.o. female,New patient, here for evaluation of the following chief complaint(s): Establish Care and Circulatory Problem          SUBJECTIVE/OBJECTIVE:  Leann Veloz has a history of peripheral vascular disease of the lower extremities. She has had this for < 1 year. Current treatment includes none. Leann Veloz has not had new wounds. Recently, she reports claudication at a distance which varies. She recently went to pcp. She has a history of lymphoma and radical dissection of right groin in relation to to MRSA infection after surgery. Leann Veloz reports that the right leg is equal to the left. She reports claudication is worsened and is mostly in the form of crampy type pain starting in the hips, thighs and calves. She has a short recovery time. This is reproducible in nature. She reports ischemic rest pain 0 times per night. She reports walking with cart does not help. Minna Nurse is a 54 y.o. female with the following history as recorded in [facility]: There are no active problems to display for this patient.     Current Outpatient Medications   Medication Sig Dispense Refill    furosemide (LASIX) 20 MG tablet Take 1 tablet by mouth 2 times daily 60 tablet 5    Brompheniramine-Pseudoeph 4-60 MG CAPS Take 1 tablet by mouth 2 times daily as needed (cough) 20 capsule 0    traZODone (DESYREL) 50 MG tablet Take 1 tablet by mouth nightly as needed for Sleep 30 tablet 5    lactulose (CHRONULAC) 10 GM/15ML solution Take 15 mLs by mouth every evening 450 mL 1    fluticasone-salmeterol (ADVAIR HFA) 115-21 MCG/ACT inhaler Inhale 2 puffs into the lungs 2 times daily 1 Inhaler 5    albuterol sulfate HFA (VENTOLIN HFA) 108 (90 Base) MCG/ACT inhaler Inhale 2 puffs into the lungs 4 times daily as needed for Wheezing 1 Inhaler 5    albuterol (ACCUNEB) 1.25 MG/3ML nebulizer solution Inhale 3 mLs into the lungs every 6 hours as needed for Wheezing 360 mL 3  FLUoxetine (PROZAC) 20 MG capsule Take 1 capsule by mouth daily 30 capsule 0    nystatin (MYCOSTATIN) 087943 UNIT/GM ointment Apply topically 2 times daily. 30 g 2    cloNIDine (CATAPRES) 0.1 MG tablet Take 0.1 mg by mouth 2 times daily      oxyCODONE-acetaminophen (PERCOCET) 7.5-325 MG per tablet Take 1 tablet by mouth every 4 hours as needed for Pain.  atorvastatin (LIPITOR) 40 MG tablet Take 40 mg by mouth daily      atenolol (TENORMIN) 50 MG tablet Take 50 mg by mouth daily      sennosides-docusate sodium (SENOKOT-S) 8.6-50 MG tablet Take 1 tablet by mouth daily      guaiFENesin (EXPECTORANT COUGH CONTROL PO) Take by mouth      vitamin D3 (CHOLECALCIFEROL) 10 MCG (400 UNIT) TABS tablet Take 400 Units by mouth daily      vitamin B-12 (CYANOCOBALAMIN) 1000 MCG tablet Take 1,000 mcg by mouth daily      melatonin 3 MG TABS tablet Take 3 mg by mouth daily       No current facility-administered medications for this visit. Allergies: Ceclor [cefaclor], Penicillins, and Sulfa antibiotics  Past Medical History:   Diagnosis Date    Allergic rhinitis     Cancer ([facility] [address]     cervical, lymphoma    Congenital heart disease     MRSA infection     Stroke [facility])     after heart surgery 2013     Past Surgical History:   Procedure Laterality Date   Sherl Loge CARDIAC SURGERY      Ross procedure and surgery prior on valve    PARTIAL HYSTERECTOMY       History reviewed. No pertinent family history. Social History     Tobacco Use    Smoking status: Current Every Day Smoker     Packs/day: 1.00     Types: Cigarettes     Start date: 200    Smokeless tobacco: Never Used   Substance Use Topics    Alcohol use: Never     Frequency: Never     Binge frequency: Never       ROS  Eyes  no sudden vision change or amaurosis. Respiratory  no significant shortness of breath,  Cardiovascular  no chest pain or syncope. No  significant leg swelling. No claudication.   resipratory - has appt with pulmonology for SOB and cough Musculoskeletal  no gait disturbance  Skin  no new wound. Neurologic   No speech difficulty or lateralizing weakness. All other review of systems are negative. Patient-Reported Vitals 1/4/2021   Patient-Reported Weight Today its 133lbs. However been weighing daily an verrys from 142~1302lbs,depending on intake an out put urine &bowel   Patient-Reported Height 52   Patient-Reported Systolic 984   Patient-Reported Diastolic 55   Patient-Reported Pulse 56   Patient-Reported Temperature 97.6   Patient-Reported SpO2 Na   Patient-Reported Peak Flow N/a          Physical Exam    Due to this being a TeleHealth encounter, evaluation of the following organ systems is limited: Vitals/Constitutional/EENT/Resp/CV/GI//MS/Neuro/Skin/Heme-Lymph-Imm. Constitutional  well developed, well nourished. No diaphoresis or acute distress. Neck- ROM appears normal  Extremities -No cyanosis, clubbing, no edema. No signs atheroembolic event. Extensive scarring right groin from previous surgery  Pulmonary  effort appears normal.  No respiratory distress. No accessory muscle use  Neurologic  alert and oriented X 3. Cranial Nerves II-XII grossly intact  Skin  intact. No rash, erythema, or pallor. Psychiatric  mood, affect, and behavior appear normal.  Judgment and thought processes appear normal.    Risk factors for atherosclerosis of all vascular beds have been reviewed with the patient including:  Family history, tobacco abuse in all forms, elevated cholesterol, hyperlipidemia, and diabetes. Ct -   The lung bases are clear. Partially imaged abandoned epicardial pacing   leads. No suspicious liver lesions. No cholelithiasis or biliary ductal   dilatation. Pancreas appears normal. Spleen appears normal. Adrenal   glands appear normal. No urolithiasis or hydronephrosis. No solid   renal mass. LEFT lower pole focal renal cortical scarring. No focal   urinary bladder abnormality. Large volume stool and mild gaseous distention of the ascending,   transverse, and proximal descending colon. Normal appendix. No   evidence of active bowel inflammation or bowel obstruction. No ascites   or free pelvic fluid. No pelvic mass or pelvic collection. Uterus is   surgically absent. Normal caliber abdominal aorta with heavy atherosclerotic   calcification. 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 No enlarged   retroperitoneal, mesenteric, pelvic, or inguinal lymph nodes. Postsurgical change in the RIGHT groin. No acute osseous finding.               Reviewed on this visit: pcp notes    Reviewed previous studies including: CT scan  Individual images were reviewed. I agree with the findings  Results were discussed with the patient. ASSESSMENT    1. Atheroscler of native artery of both legs with intermit claudication ([address]         PLAN  1. Atheroscler of native artery of both legs with intermit claudication (HCC)  -     VL LOWER EXTREMITY ARTERIAL SEGMENTAL PRESSURES W PPG; Future    Discussed management of CT scan which includes:  start asa to decrease rate of plaque buildup  continue lipitor to decrease rate of plaque buildup   Strongly encourage start/continue statin therapy   Recommend no smoking - discussed the effect tobacco has on illness; discussed options of patches and mental support including consistent use of lozenges, hard candy when urge arises to help with cessation    Patient instructed to walk as much as possible. Call our office with any progressive pain in leg(s) or hip(s) with walking. Take good care of your feet. Let us know right away if you develop a wound on your foot. Has right groin radical excision and had mrsa.   This will go into planning after we have seen Yeimi Freitas   has appt with pulmonologist - chronic cough sob - we will wait until after seen by them to schedule any procedure michael Rodriguez is a 54 y.o. female being evaluated by a Virtual Visit (video visit) encounter to address concerns as mentioned above. A caregiver was present when appropriate. Due to this being a TeleHealth encounter (During [facility] public health emergency), evaluation of the following organ systems was limited: Vitals/Constitutional/EENT/Resp/CV/GI//MS/Neuro/Skin/Heme-Lymph-Imm. Pursuant to the emergency declaration under the [address] [place] [address] and the [employer] and [employer] Act, this Virtual Visit was conducted with patient's (and/or legal guardian's) consent, to reduce the patient's risk of exposure to COVID-19 and provide necessary medical care. The patient (and/or legal guardian) has also been advised to contact this office for worsening conditions or problems, and seek emergency medical treatment and/or call 911 if deemed necessary. Patient identification was verified at the start of the visit: Yes      Services were provided through a video synchronous discussion virtually to substitute for in-person clinic visit. Patient and provider were located at their individual homes. An electronic signature was used to authenticate this note.     --MELIDA Perez

## 2021-01-08 DIAGNOSIS — K59.00 CONSTIPATION, UNSPECIFIED CONSTIPATION TYPE: ICD-10-CM

## 2021-01-11 ENCOUNTER — TELEPHONE (OUTPATIENT)
Dept: FAMILY MEDICINE CLINIC | Age: 46
End: 2021-01-11

## 2021-01-11 DIAGNOSIS — G47.00 INSOMNIA, UNSPECIFIED TYPE: Primary | ICD-10-CM

## 2021-01-11 DIAGNOSIS — R05.9 COUGH: ICD-10-CM

## 2021-01-11 RX ORDER — LACTULOSE 10 G/15ML
10 SOLUTION ORAL EVERY EVENING
Qty: 450 ML | Refills: 1 | Status: SHIPPED | OUTPATIENT
Start: 2021-01-11 | End: 2021-02-07 | Stop reason: SDUPTHER

## 2021-01-11 NOTE — TELEPHONE ENCOUNTER
Ashok Locke has called a couple times requesting a cough medication . And also something for sleep . She can not use trazadone it gives her horrible nightmares and wakes her up . She is in pain which she has medication for but doesn't want to take it at night . Please advise.

## 2021-01-12 RX ORDER — DOXEPIN HYDROCHLORIDE 25 MG/1
25 CAPSULE ORAL NIGHTLY
Qty: 30 CAPSULE | Refills: 3 | Status: SHIPPED | OUTPATIENT
Start: 2021-01-12 | End: 2021-02-07 | Stop reason: SDUPTHER

## 2021-01-12 RX ORDER — DEXTROMETHORPHAN HYDROBROMIDE AND PROMETHAZINE HYDROCHLORIDE 15; 6.25 MG/5ML; MG/5ML
5 SYRUP ORAL 4 TIMES DAILY PRN
Qty: 100 ML | Refills: 0 | Status: SHIPPED | OUTPATIENT
Start: 2021-01-12 | End: 2021-01-19

## 2021-01-18 NOTE — PROGRESS NOTES
"Chief Complaint  COPD    Subjective    History of Present Illness {CC  Problem List  Visit Diagnosis   Encounters  Notes  Medications  Labs  Result Review Imaging  Media :23}     Maday Fung presents to [facility]   Ms. Diaz is a pleasant 45-year-old female referred by her PCP, Viviana Pino for COPD.  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  She had an echo in January that showed an EF of 50% and grade 2 diastolic dysfunction.  She had PFTs done in October that showed very severe obstruction with no significant change postbronchodilator and severely reduced diffusion capacity.  She has albuterol HFA that she uses about every other day but does not feel it offers much help.  She has an albuterol nebulizer that she was using twice a day up until about 2 to 3 weeks ago and again she feels like it is offered no help.  She had a chest x-ray at [facility] in December that showed questionable bronchiolitis.  She has shortness of breath with minimal activity.  She has a cough that is sometimes productive sometimes dry.  She continues to smoke about 7 to 8 cigarettes a day.  Prior to that it was 1-1/2 packs/day down to 1 pack/day for the last 25 years.  She had lab work in December showing a elevated BNP of 1626.  Sed rate and CRP were normal.  MATTHEW was negative.  She has had 3 episodes where her eyes will swell shut be really red looking like they are burnt and become painful, burning and itching.  She denies any family history of autoimmune or connective tissue disease.  She had short-term radiation for the treatment of her cervical cancer.       Objective   Vital Signs:   /84   Pulse 68   Temp 96.8 °F (36 °C)   Ht 157.5 cm (62\")   Wt 66.2 kg (146 lb)   SpO2 98% Comment: RA  BMI 26.70 " kg/m²     Physical Exam  Vitals signs reviewed.   Cardiovascular:      Rate and Rhythm: Normal rate and regular rhythm.      Heart sounds: No murmur. No friction rub. No gallop.    Pulmonary:      Effort: Pulmonary effort is normal.      Breath sounds: Normal breath sounds.   Skin:     Capillary Refill: Capillary refill takes less than 2 seconds.      Nails: There is no clubbing.     Neurological:      Mental Status: She is alert and oriented to person, place, and time.        Result Review :   The following data was reviewed by: DEEPTHI Teixeira on 01/25/2021:  CMP    CMP 8/3/20 12/21/20   BUN 17 13   Creatinine 0.8 1 (A)   eGFR Non  Am >60 60 (A)   eGFR  Am >59 >59   Sodium 140 145   Potassium 4.0 4.4   Chloride 101 103   Calcium 9.3 9.7   Albumin 4.5 4.6   Total Bilirubin 0.5 0.4   Alkaline Phosphatase 67 89   AST (SGOT) 21 22   ALT (SGPT) 19 19   (A) Abnormal value       Comments are available for some flowsheets but are not being displayed.           CBC w/diff    CBC w/Diff 12/21/20   WBC 6.8   RBC 4.35   Hemoglobin 14.2   Hematocrit 44.4   .1 (A)   MCH 32.6 (A)   MCHC 32.0 (A)   RDW 13.2   Platelets 279   Neutrophil Rel % 54.6   Lymphocyte Rel % 29.8   Monocyte Rel % 10.7 (A)   Eosinophil Rel % 3.9   Basophil Rel % 0.9   (A) Abnormal value                MATTHEW    Common Labsle 12/21/20   MATTHEW None Detected      Comments are available for some flowsheets but are not being displayed.           Data reviewed: Radiologic studies Chest x-ray as noted above       My interpretation of the PFT: Very severe obstructive disease with severely reduced diffusion capacity      Patient's Body mass index is 26.7 kg/m². BMI is within normal parameters. No follow-up required..      Assessment and Plan    Problem List Items Addressed This Visit     None      Visit Diagnoses     Stage 3 severe COPD by GOLD classification (CMS/[facility])    -  Primary    Relevant Medications    albuterol (ACCUNEB) 1.25  MG/3ML nebulizer solution    albuterol sulfate  (90 Base) MCG/ACT inhaler    umeclidinium-vilanterol (Anoro Ellipta) 62.5-25 MCG/INH aerosol powder  inhaler    Dyspnea on exertion        Chronic diastolic heart failure (CMS/HCC)        Relevant Medications    atenolol (TENORMIN) 50 MG tablet    Current every day smoker          I spent 45 minutes caring for Maday on this date of service. This time includes time spent by me in the following activities:preparing for the visit, reviewing tests, obtaining and/or reviewing a separately obtained history, performing a medically appropriate examination and/or evaluation , counseling and educating the patient/family/caregiver, ordering medications, tests, or procedures, documenting information in the medical record and independently interpreting results and communicating that information with the patient/family/caregiver      COPD-she is provided samples of Anoro.  She is given a handout on the medication as well.  Patient left the room prior to demonstration on how to use the inhalers.  When asked upfront to give demonstration she indicated she knew how to use it.  She is recommended to continue to use her rescue inhaler as needed as well as her nebulizer as needed.  Would like to get a CAT scan on her however patient would like to defer at this time.  She is waiting to get Covid vaccination in order to proceed with her peripheral artery intervention.  Current every day smoker -she is encouraged to quit smoking.  Diastolic heart failure-we discussed the need to continue to watch a low-sodium diet.  She is recommended to have 2 mg or less a day of sodium.    Follow Up   Return in about 4 weeks (around 2/22/2021).  Patient was given instructions and counseling regarding her condition or for health maintenance advice. Please see specific information pulled into the AVS if appropriate.     Margret Wright, APRN  1/25/2021  16:27 CST

## 2021-01-19 ENCOUNTER — HOSPITAL ENCOUNTER (OUTPATIENT)
Dept: VASCULAR LAB | Age: 46
Discharge: HOME | End: 2021-01-19
Payer: MEDICARE

## 2021-01-19 DIAGNOSIS — I70.213 ATHEROSCLEROSIS OF NATIVE ARTERY OF BOTH LOWER EXTREMITIES WITH INTERMITTENT CLAUDICATION (HCC): Primary | ICD-10-CM

## 2021-01-19 PROCEDURE — 93923 UPR/LXTR ART STDY 3+ LVLS: CPT

## 2021-01-20 ENCOUNTER — VIRTUAL VISIT (OUTPATIENT)
Dept: VASCULAR SURGERY | Age: 46
End: 2021-01-20
Payer: MEDICARE

## 2021-01-20 DIAGNOSIS — I70.213 ATHEROSCLER OF NATIVE ARTERY OF BOTH LEGS WITH INTERMIT CLAUDICATION (HCC): Primary | ICD-10-CM

## 2021-01-20 PROCEDURE — G8484 FLU IMMUNIZE NO ADMIN: HCPCS | Performed by: NURSE PRACTITIONER

## 2021-01-20 PROCEDURE — G8427 DOCREV CUR MEDS BY ELIG CLIN: HCPCS | Performed by: NURSE PRACTITIONER

## 2021-01-20 PROCEDURE — 4004F PT TOBACCO SCREEN RCVD TLK: CPT | Performed by: NURSE PRACTITIONER

## 2021-01-20 PROCEDURE — 99214 OFFICE O/P EST MOD 30 MIN: CPT | Performed by: NURSE PRACTITIONER

## 2021-01-20 PROCEDURE — G8419 CALC BMI OUT NRM PARAM NOF/U: HCPCS | Performed by: NURSE PRACTITIONER

## 2021-01-20 NOTE — PROGRESS NOTES
Mercy Mg (:  1975) is a 54 y.o. female,Established patient, here for evaluation of the following chief complaint(s): No chief complaint on file. SUBJECTIVE/OBJECTIVE:  Parrish Escobar has a history of peripheral vascular disease of the lower extremities. She has had this for 1 - 5 years. Current treatment includes ASA EC daily. Parrish Escobar has not had new wounds. Recently, she reports claudication at a distance which varies. Parrish Escobar reports that the right leg is equal to the left. She reports claudication is worsened and is mostly in the form of crampy type pain starting in the hips. She has a short recovery time. This is reproducible in nature. She reports ischemic rest pain 0 times per night. She reports walking with cart does not help. Mercy Mg is a 54 y.o. female [facility] the [facility] There are no active problems to display for this patient. Current Outpatient Medications   Medication Sig Dispense Refill    doxepin (SINEQUAN) 25 MG capsule Take 1 capsule by mouth nightly 30 capsule 3    lactulose (CHRONULAC) 10 GM/15ML solution Take 15 mLs by mouth every evening 450 mL 1    furosemide (LASIX) 20 MG tablet Take 1 tablet by mouth 2 times daily 60 tablet 5    Brompheniramine-Pseudoeph 4-60 MG CAPS Take 1 tablet by mouth 2 times daily as needed (cough) 20 capsule 0    fluticasone-salmeterol (ADVAIR HFA) 115-21 MCG/ACT inhaler Inhale 2 puffs into the lungs 2 times daily 1 Inhaler 5    albuterol sulfate HFA (VENTOLIN HFA) 108 (90 Base) MCG/ACT inhaler Inhale 2 puffs into the lungs 4 times daily as needed for Wheezing 1 Inhaler 5    albuterol (ACCUNEB) 1.25 MG/3ML nebulizer solution Inhale 3 mLs into the lungs every 6 hours as needed for Wheezing 360 mL 3    FLUoxetine (PROZAC) 20 MG capsule Take 1 capsule by mouth daily 30 capsule 0    nystatin (MYCOSTATIN) 267639 UNIT/GM ointment Apply topically 2 times daily.  30 g 2  cloNIDine (CATAPRES) 0.1 MG tablet Take 0.1 mg by mouth 2 times daily      oxyCODONE-acetaminophen (PERCOCET) 7.5-325 MG per tablet Take 1 tablet by mouth every 4 hours as needed for Pain.  atorvastatin (LIPITOR) 40 MG tablet Take 40 mg by mouth daily      atenolol (TENORMIN) 50 MG tablet Take 50 mg by mouth daily      sennosides-docusate sodium (SENOKOT-S) 8.6-50 MG tablet Take 1 tablet by mouth daily      guaiFENesin (EXPECTORANT COUGH CONTROL PO) Take by mouth      vitamin D3 (CHOLECALCIFEROL) 10 MCG (400 UNIT) TABS tablet Take 400 Units by mouth daily      vitamin B-12 (CYANOCOBALAMIN) 1000 MCG tablet Take 1,000 mcg by mouth daily      melatonin 3 MG TABS tablet Take 3 mg by mouth daily       No current facility-administered medications for this visit. Allergies: Ceclor [cefaclor], Penicillins, and Sulfa antibiotics  Past Medical History:   Diagnosis Date    Allergic rhinitis     Cancer ([facility] [address]     cervical, lymphoma    Congenital heart disease     MRSA infection     Stroke [facility])     after heart surgery 2013     Past Surgical History:   Procedure Laterality Date   Verena Ramirez CARDIAC SURGERY      Ross procedure and surgery prior on valve    PARTIAL HYSTERECTOMY       No family history on file. Social History     Tobacco Use    Smoking status: Current Every Day Smoker     Packs/day: 1.00     Types: Cigarettes     Start date: 200    Smokeless tobacco: Never Used   Substance Use Topics    Alcohol use: Never     Frequency: Never     Binge frequency: Never       ROS  Eyes  no sudden vision change or amaurosis. Respiratory  no significant shortness of breath,  Cardiovascular  no chest pain or syncope. No  significant leg swelling. No claudication. Musculoskeletal  no gait disturbance  Skin  no new wound. Neurologic   No speech difficulty or lateralizing weakness. All other review of systems are negative.     Patient-Reported Vitals 1/4/2021 Velvet Canseco is a 54 y.o. female being evaluated by a Virtual Visit (video visit) encounter to address concerns as mentioned above. A caregiver was present when appropriate. Due to this being a TeleHealth encounter ([facility]), evaluation of the following organ systems was limited: Vitals/Constitutional/EENT/Resp/CV/GI//MS/Neuro/Skin/Heme-Lymph-Imm. Pursuant to the emergency declaration under the [address] [place] [address] and the [employer] Act, this Virtual Visit was conducted with patient's (and/or legal guardian's) consent, to reduce the patient's risk of exposure to COVID-19 and provide necessary medical care. The patient (and/or legal guardian) has also been advised to contact this office for worsening conditions or problems, and seek emergency medical treatment and/or call 911 if deemed necessary. Patient identification was verified at the start of the visit: Yes      Services were provided through a video synchronous discussion virtually to substitute for in-person clinic visit. Patient and provider were located at their individual homes. An electronic signature was used to authenticate this note.     --MELIDA Oneal             Cardiologist dr. Marisa Whelan  Had stress test 1 year ago and it was good  Wants to wait until after covid vaccine

## 2021-01-25 ENCOUNTER — OFFICE VISIT (OUTPATIENT)
Dept: PULMONOLOGY | Facility: CLINIC | Age: 46
End: 2021-01-25

## 2021-01-25 VITALS
BODY MASS INDEX: 26.87 KG/M2 | OXYGEN SATURATION: 98 % | SYSTOLIC BLOOD PRESSURE: 126 MMHG | HEART RATE: 68 BPM | WEIGHT: 146 LBS | TEMPERATURE: 96.8 F | HEIGHT: 62 IN | DIASTOLIC BLOOD PRESSURE: 84 MMHG

## 2021-01-25 DIAGNOSIS — R06.09 DYSPNEA ON EXERTION: ICD-10-CM

## 2021-01-25 DIAGNOSIS — I50.32 CHRONIC DIASTOLIC HEART FAILURE (HCC): ICD-10-CM

## 2021-01-25 DIAGNOSIS — J44.9 STAGE 3 SEVERE COPD BY GOLD CLASSIFICATION (HCC): Primary | ICD-10-CM

## 2021-01-25 DIAGNOSIS — F17.200 CURRENT EVERY DAY SMOKER: ICD-10-CM

## 2021-01-25 PROBLEM — G47.00 INSOMNIA, UNSPECIFIED: Status: ACTIVE | Noted: 2021-01-25

## 2021-01-25 PROBLEM — F41.1 GENERALIZED ANXIETY DISORDER: Status: ACTIVE | Noted: 2021-01-25

## 2021-01-25 PROBLEM — I50.22 CHRONIC SYSTOLIC CHF (CONGESTIVE HEART FAILURE): Status: ACTIVE | Noted: 2021-01-25

## 2021-01-25 PROBLEM — E55.9 VITAMIN D DEFICIENCY: Status: ACTIVE | Noted: 2021-01-25

## 2021-01-25 PROCEDURE — 99204 OFFICE O/P NEW MOD 45 MIN: CPT | Performed by: NURSE PRACTITIONER

## 2021-01-25 RX ORDER — FUROSEMIDE 20 MG/1
20 TABLET ORAL
COMMUNITY
Start: 2021-01-05

## 2021-01-25 RX ORDER — LANOLIN ALCOHOL/MO/W.PET/CERES
3 CREAM (GRAM) TOPICAL
COMMUNITY
End: 2021-12-20 | Stop reason: ALTCHOICE

## 2021-01-25 RX ORDER — DOXEPIN HYDROCHLORIDE 25 MG/1
25 CAPSULE ORAL
COMMUNITY
Start: 2021-01-12

## 2021-01-25 RX ORDER — LANOLIN ALCOHOL/MO/W.PET/CERES
1000 CREAM (GRAM) TOPICAL
COMMUNITY

## 2021-01-25 RX ORDER — OXYCODONE AND ACETAMINOPHEN 7.5; 325 MG/1; MG/1
1 TABLET ORAL
COMMUNITY
End: 2021-12-20

## 2021-01-25 RX ORDER — LACTULOSE 10 G/15ML
10 SOLUTION ORAL
COMMUNITY
Start: 2021-01-11

## 2021-01-25 RX ORDER — ATENOLOL 50 MG/1
50 TABLET ORAL
COMMUNITY

## 2021-01-25 RX ORDER — FLUOXETINE HYDROCHLORIDE 20 MG/1
20 CAPSULE ORAL
COMMUNITY
Start: 2020-08-03

## 2021-01-25 RX ORDER — AMOXICILLIN 250 MG
1 CAPSULE ORAL
COMMUNITY

## 2021-01-25 RX ORDER — ALBUTEROL SULFATE 1.25 MG/3ML
1.25 SOLUTION RESPIRATORY (INHALATION)
COMMUNITY
Start: 2020-09-24

## 2021-01-25 RX ORDER — ALBUTEROL SULFATE 90 UG/1
2 AEROSOL, METERED RESPIRATORY (INHALATION)
COMMUNITY
Start: 2020-10-22

## 2021-01-25 RX ORDER — CLONIDINE HYDROCHLORIDE 0.1 MG/1
0.1 TABLET ORAL
COMMUNITY

## 2021-01-25 RX ORDER — OMEGA-3S/DHA/EPA/FISH OIL/D3 300MG-1000
400 CAPSULE ORAL
COMMUNITY

## 2021-01-25 RX ORDER — ATORVASTATIN CALCIUM 40 MG/1
40 TABLET, FILM COATED ORAL
COMMUNITY

## 2021-01-25 NOTE — PATIENT INSTRUCTIONS
Umeclidinium; Vilanterol inhalation powder  What is this medicine?  UMECLIDINIUM; VILANTEROL (ue MEK li DIN ee um; vye JAMES ter ol) inhalation is a combination of two medicines that decrease inflammation and help to open up the airways of your lungs. It is for chronic obstructive pulmonary disease (COPD), including chronic bronchitis or emphysema. Do NOT use for asthma or an acute asthma attack. Do NOT use for a COPD attack.  This medicine may be used for other purposes; ask your health care provider or pharmacist if you have questions.  COMMON BRAND NAME(S): HIEU CAPPS  What should I tell my health care provider before I take this medicine?  They need to know if you have any of these conditions:  · bladder problems or difficulty passing urine  · diabetes  · glaucoma  · heart disease or irregular heartbeat  · high blood pressure  · kidney disease  · pheochromocytoma  · prostate disease  · seizures  · thyroid disease  · an unusual or allergic reaction to umeclidinium, vilanterol, lactose, milk proteins, other medicines, foods, dyes, or preservatives  · pregnant or trying to get pregnant  · breast-feeding  How should I use this medicine?  This medicine is inhaled through the mouth. It is used once per day. Follow the directions on the prescription label. Do not use a spacer device with this inhaler. Take your medicine at regular intervals. Do not take your medicine more often than directed. Do not stop taking except on your doctor's advice. Make sure that you are using your inhaler correctly. Ask you doctor or health care provider if you have any questions.  Talk to your pediatrician regarding the use of this medicine in children. Special care may be needed.  Overdosage: If you think you have taken too much of this medicine contact a poison control center or emergency room at once.  NOTE: This medicine is only for you. Do not share this medicine with others.  What if I miss a dose?  If you miss a dose, use it as  soon as you can. If it is almost time for your next dose, use only that dose and continue with your regular schedule. Do not use double or extra doses.  What may interact with this medicine?  Do not take this medicine with any of the following medications:  · cisapride  · dofetilide  · dronedarone  · MAOIs like Carbex, Eldepryl, Marplan, Nardil, and Parnate  · pimozide  · thioridazine  · ziprasidone  This medicine may also interact with the following medications:  · antihistamines for allergy  · antiviral medicines for HIV or AIDS  · atropine  · beta-blockers like metoprolol and propranolol  · certain medicines for bladder problems like oxybutynin, tolterodine  · certain medicines for depression, anxiety, or psychotic disturbances  · certain medicines for Parkinson's disease like benztropine, trihexyphenidyl  · certain medicines for stomach problems like dicyclomine, hyoscyamine  · certain medicines for travel sickness like scopolamine  · diuretics  · ipratropium  · medicines for colds  · medicines for fungal infections like ketoconazole and itraconazole  · other medicines for breathing problems  · other medicines that prolong the QT interval (cause an abnormal heart rhythm)  · tiotropium  This list may not describe all possible interactions. Give your health care provider a list of all the medicines, herbs, non-prescription drugs, or dietary supplements you use. Also tell them if you smoke, drink alcohol, or use illegal drugs. Some items may interact with your medicine.  What should I watch for while using this medicine?  Visit your doctor or health care professional for regular checkups. Tell your doctor or health care professional if your symptoms do not get better.  If your symptoms get worse or if you need your short-acting inhalers more often, call your doctor right away. Do not use this medicine more than once every 24 hours.  What side effects may I notice from receiving this medicine?  Side effects that you  should report to your doctor or health care professional as soon as possible:  · allergic reactions like skin rash or hives, swelling of the face, lips, or tongue  · breathing problems right after inhaling your medicine  · changes in vision  · chest pain  · eye pain  · fast, irregular heartbeat  · feeling faint or lightheaded, falls  · fever or chills  · nausea, vomiting  · trouble passing urine or change in the amount of urine  Side effects that usually do not require medical attention (report to your doctor or health care professional if they continue or are bothersome):  · constipation  · cough  · diarrhea  · headache  · muscle cramps  · nervousness  · sore throat  · tremor  This list may not describe all possible side effects. Call your doctor for medical advice about side effects. You may report side effects to FDA at 2-643-FDA-4824.  Where should I keep my medicine?  Keep out of the reach of children.  Store at room temperature between 15 and 30 degrees C (59 and 86 degrees F). Store in a dry place away from direct heat or sunlight. Throw away 6 weeks after you remove the inhaler from the foil tray, or after the dose indicator reads 0, whichever comes first. Throw away any unopened packages after the expiration date.  NOTE: This sheet is a summary. It may not cover all possible information. If you have questions about this medicine, talk to your doctor, pharmacist, or health care provider.  © 2020 [employer]/Gold Standard (2019-06-03 14:47:24)

## 2021-01-26 ENCOUNTER — TELEPHONE (OUTPATIENT)
Dept: FAMILY MEDICINE CLINIC | Age: 46
End: 2021-01-26

## 2021-02-04 ENCOUNTER — HOSPITAL ENCOUNTER (OUTPATIENT)
Dept: PAIN MANAGEMENT | Age: 46
Discharge: HOME | End: 2021-02-04
Payer: MEDICARE

## 2021-02-04 VITALS — OXYGEN SATURATION: 89 % | WEIGHT: 151 LBS | HEIGHT: 62 IN | BODY MASS INDEX: 27.79 KG/M2

## 2021-02-04 DIAGNOSIS — M79.2 NERVE PAIN: Primary | ICD-10-CM

## 2021-02-04 DIAGNOSIS — M54.50 CHRONIC BILATERAL LOW BACK PAIN WITHOUT SCIATICA: ICD-10-CM

## 2021-02-04 DIAGNOSIS — G89.29 CHRONIC BILATERAL LOW BACK PAIN WITHOUT SCIATICA: ICD-10-CM

## 2021-02-04 DIAGNOSIS — Z02.89 PAIN MEDICATION AGREEMENT: ICD-10-CM

## 2021-02-04 PROCEDURE — 99215 OFFICE O/P EST HI 40 MIN: CPT

## 2021-02-04 PROCEDURE — 99214 OFFICE O/P EST MOD 30 MIN: CPT | Performed by: NURSE PRACTITIONER

## 2021-02-04 RX ORDER — OXYCODONE AND ACETAMINOPHEN 7.5; 325 MG/1; MG/1
1 TABLET ORAL EVERY 8 HOURS PRN
Qty: 90 TABLET | Refills: 0 | Status: CANCELLED | OUTPATIENT
Start: 2021-02-08 | End: 2021-03-10

## 2021-02-04 RX ORDER — PREGABALIN 75 MG/1
75 CAPSULE ORAL 2 TIMES DAILY
Qty: 60 CAPSULE | Refills: 2 | Status: SHIPPED | OUTPATIENT
Start: 2021-02-04 | End: 2021-06-03 | Stop reason: ALTCHOICE

## 2021-02-04 RX ORDER — OXYCODONE AND ACETAMINOPHEN 10; 325 MG/1; MG/1
1 TABLET ORAL EVERY 6 HOURS PRN
Qty: 12 TABLET | Refills: 0 | Status: SHIPPED | OUTPATIENT
Start: 2021-02-04 | End: 2021-02-04 | Stop reason: SDUPTHER

## 2021-02-04 ASSESSMENT — ENCOUNTER SYMPTOMS
BACK PAIN: 1
CONSTIPATION: 0

## 2021-02-04 ASSESSMENT — PAIN SCALES - GENERAL: PAINLEVEL_OUTOF10: 9

## 2021-02-04 NOTE — H&P
[facility] Physical & Pain Medicine    History and Physical    Patient Name: Donte Andrade    MR #: [id number]    Account [de-identified]    : 1975    Age: 54 y.o. Sex: female    Date: 2021    PCP: MELIDA Sanders    Referring Provider: MELIDA Akers    Chief Complaint:   Chief Complaint   Patient presents with    Hip Pain    Back Pain       History of Present Illness: The patient is a 54 y.o. female who was referrred with primary complaints of chronic low back pain and hip pain. Patient had surgery for lymphoma over 14 years ago. Patient suffered a staph infection which resulted in debridement of a large wound. She has had nerve damage since that time. Patient has been pain medication since that surgery. Patient has been on Percocet since that time. She started one percocet per day and she had gradually increased up to 3-4 per day. Patient states that over the last year her medication does not seem to last longer than 4 hours. Patient states that her pain keeps her up at night. Patient has taken gabapentin 600 - 700 mg in the past but she does not feel like it has helped. She believes that she has tried Lyrica but can not remember. Patient states that she is open to injections if she gains benefit. Past injections have not been helpful. Over the last year, she has started have worsening low back pain. Patient feels like the low back pain comes from the right hip/groin. Patient's hip pain affects her ability to walk which she feels affects her back. Patient had injections in her right groin which did not help. Patient feels like she had MRI and xray in the last year to year in half. Unable to connect into Care everywhere    Back Pain  This is a chronic problem. The current episode started more than 1 year ago. The problem occurs constantly. The problem has been gradually worsening since onset. The pain is present in the lumbar spine and sacro-iliac.  Quality: \"really bad pain\" The pain radiates to the right thigh (right hip). The symptoms are aggravated by standing, bending, sitting and lying down (prolonged position). Associated symptoms include leg pain, numbness and tingling. Pertinent negatives include no weakness. (Right groin and bilateral feet numbness/tingling)       Screening Tools:     PE    ORT: 7    PHQ-9: 5    Current Pain Assessment  Pain Assessment  Pain Assessment: 0-10  Pain Level: 9  Patient's Stated Pain Goal: 3  Pain Type: Chronic pain  Pain Location: Back, Leg      Past Medical History  Past Medical History:   Diagnosis Date    Allergic rhinitis     Cancer ([facility] [address]     cervical, lymphoma    Congenital heart disease     MRSA infection     Stroke [facility])     after heart surgery        Allergies  Ceclor [cefaclor], Lortab [hydrocodone-acetaminophen], Penicillins, and Sulfa antibiotics    Current Medications  Current Outpatient Medications   Medication Sig Dispense Refill    pregabalin (LYRICA) 75 MG capsule Take 1 capsule by mouth 2 times daily for 90 days.  60 capsule 2    albuterol (ACCUNEB) 1.25 MG/3ML nebulizer solution Inhale 3 mLs into the lungs every 6 hours as needed for Wheezing 360 mL 3    atorvastatin (LIPITOR) 40 MG tablet Take 40 mg by mouth daily      sennosides-docusate sodium (SENOKOT-S) 8.6-50 MG tablet Take 1 tablet by mouth daily      vitamin D3 (CHOLECALCIFEROL) 10 MCG (400 UNIT) TABS tablet Take 400 Units by mouth daily      vitamin B-12 (CYANOCOBALAMIN) 1000 MCG tablet Take 1,000 mcg by mouth daily      melatonin 3 MG TABS tablet Take 3 mg by mouth daily      albuterol sulfate HFA (VENTOLIN HFA) 108 (90 Base) MCG/ACT inhaler Inhale 2 puffs into the lungs 4 times daily as needed for Wheezing 1 Inhaler 5    furosemide (LASIX) 20 MG tablet Take 1 tablet by mouth 2 times daily 60 tablet 5    doxepin (SINEQUAN) 25 MG capsule Take 1 capsule by mouth nightly 30 capsule 5    FLUoxetine (PROZAC) 20 MG capsule Take 1 capsule by mouth daily 30 capsule 5    lactulose (CHRONULAC) 10 GM/15ML solution Take 15 mLs by mouth every evening 450 mL 5    Cranberry 125 MG TABS Take by mouth      Omega-3 Fatty Acids (FISH OIL) 1000 MG CAPS Take 3,000 mg by mouth 3 times daily      oxyCODONE-acetaminophen (PERCOCET) 7.5-325 MG per tablet Take 1 tablet by mouth every 8 hours as needed for Pain (strength decrease) for up to 30 days. (may fill 3/10/21) 90 tablet 0    aspirin 81 MG EC tablet Take 81 mg by mouth daily      Potassium 99 MG TABS Take by mouth      fluticasone-salmeterol (ADVAIR HFA) 115-21 MCG/ACT inhaler Inhale 2 puffs into the lungs 2 times daily 1 Inhaler 5     No current facility-administered medications for this encounter.         Social History    Social History     Socioeconomic History    Marital status: Single     Spouse name: None    Number of children: None    Years of education: None    Highest education level: None   Occupational History    None   Social Needs    Financial resource strain: None    Food insecurity     Worry: None     Inability: None    Transportation needs     Medical: None     Non-medical: None   Tobacco Use    Smoking status: Current Every Day Smoker     Packs/day: 1.00     Types: Cigarettes     Start date: 1990    Smokeless tobacco: Never Used   Substance and Sexual Activity    Alcohol use: Never     Frequency: Never     Binge frequency: Never    Drug use: Never    Sexual activity: None   Lifestyle    Physical activity     Days per week: None     Minutes per session: None    Stress: None   Relationships    Social connections     Talks on phone: None     Gets together: None     Attends Yazidism service: None     Active member of club or organization: None     Attends meetings of clubs or organizations: None     Relationship status: None    Intimate partner violence     Fear of current or ex partner: None     Emotionally abused: None     Physically abused: None     Forced sexual activity: None Other Topics Concern    None   Social History Narrative    None         Family History  family history includes Cancer in her father; Diabetes in her mother; Heart Disease in her mother. Review of Systems:  Review of Systems   Constitutional: Positive for activity change. Gastrointestinal: Negative for constipation. Musculoskeletal: Positive for arthralgias, back pain, myalgias and neck pain. Neurological: Positive for tingling and numbness. Negative for weakness. Psychiatric/Behavioral: Positive for sleep disturbance. Negative for agitation, self-injury and suicidal ideas. The patient is not nervous/anxious. 14 point ROS negative besides that noted in HPI    Physical exam:     Vitals:    02/04/21 1113   TempSrc: Temporal   SpO2: (!) 89%   Weight: 151 lb (68.5 kg)   Height: 5' 2\" (1.575 m)       Body mass index is 27.62 kg/m². Physical Exam  Vitals signs and nursing note reviewed. Constitutional:       General: She is not in acute distress. Appearance: She is well-developed. HENT:      Head: Normocephalic. Right Ear: External ear normal.      Left Ear: External ear normal.      Nose: Nose normal.   Eyes:      Conjunctiva/sclera: Conjunctivae normal.      Pupils: Pupils are equal, round, and reactive to light. Neck:      Vascular: No JVD. Trachea: No tracheal deviation. Cardiovascular:      Rate and Rhythm: Normal rate. Pulmonary:      Effort: Pulmonary effort is normal.   Abdominal:      General: There is no distension. Tenderness: There is no abdominal tenderness. Musculoskeletal:      Right hip: She exhibits decreased range of motion and bony tenderness. Lumbar back: She exhibits tenderness, pain and spasm. Legs:       Comments: Very tender in bilateral SI areas. Skin:     General: Skin is warm and dry. Neurological:      Mental Status: She is alert and oriented to person, place, and time. Cranial Nerves: No cranial nerve deficit. Psychiatric:         Behavior: Behavior normal.         Thought Content: Thought content normal.         Judgment: Judgment normal.         Labs:     Lab Results   Component Value Date     02/18/2021    K 3.9 02/18/2021    K 3.9 02/17/2021     02/18/2021    CO2 28 02/18/2021    BUN 14 02/18/2021    CREATININE 0.8 02/18/2021    GLUCOSE 98 02/18/2021    CALCIUM 8.9 02/18/2021        Lab Results   Component Value Date    WBC 7.1 02/18/2021    HGB 12.1 02/18/2021    HCT 37.8 02/18/2021    .3 (H) 02/18/2021     02/18/2021       Assessment:                                                                                                                                        Active Problems:    Chronic bilateral low back pain without sciatica    Nerve pain    Pain medication agreement  Resolved Problems:    * No resolved hospital problems. *      PLAN:    Chronic bilateral low back pain without sciatica  - oxyCODONE-acetaminophen (PERCOCET)  MG per tablet; Take 1 tablet by mouth every 6 hours as needed for Pain for up to 3 days. Intended supply: 30 days  Dispense: 12 tablet; Refill: 0    3 day script until patient can get medication from Dr. Sher Diaz. Percocet 10 TID prn # 90 for 1 month then decrease to Percocet 7.5 mg TID prn # 90. Nerve pain  Start - pregabalin (LYRICA) 75 MG capsule; Take 1 capsule by mouth 2 times daily for 90 days. Dispense: 60 capsule; Refill: 2    Long discussion with patient regarding opioids can cause hypersensitivity when used for nerve pain especially without using medication to treat nerve pain. Explained with patient goal is to get on lowest does of pain medication as possible. Plan to wean down pain medication as nerve medication is increased. [x] Follow up    [] 4 weeks   [x] 6-8 weeks   [x] 10-12 weeks   [] 3 months  [] Post procedure to evaluate effectiveness of treatment  [x] To evaluate medications changes made at office visit.    [] To review translation of spoken language may be erroneous, or at times, nonsensical words or phrases may be inadvertently transcribed.  Although, I have reviewed the note for such errors, some may still exist.

## 2021-02-04 NOTE — TELEPHONE ENCOUNTER
Per NP percocet 10mg tid one month then decrease to 7.5 tid next month.   Pt was informed to take script sent on 2/4/21 tid and must last till Monday 2/8/21

## 2021-02-04 NOTE — PROGRESS NOTES
Clinic Documentation      Education Provided:  [x] Review of Brigida Varela  [x] Agreement Review  [x] PEG Score Calculated [x] PHQ Score Calculated [x] ORT Score Calculated    [] Compliance Issues Discussed [] Cognitive Behavior Needs [x] Exercise [] Review of Test [] Financial Issues  [x] Tobacco/Alcohol Use Reviewed [x] Teaching [x] New Patient [] Picture Obtained    Physician Plan:  [] Outgoing Referral  [] Pharmacy Consult  [] Test Ordered [x] Prescription Ordered/Changed   [] Obtained Test Results / Consult Notes        Complete if patient is withholding blood thinner for procedure     Blood Thinner Patient is currently taking:      [] Plavix (Hold for 7 days)  [] Aspirin (Hold for 5 days)     [] Pletal (Hold for 2 days)  [] Pradaxa (Hold for 3 days)    [] Effient (Hold for 7 days)  [] Xarelto (Hold for 2 days)    [] Eliquis (Hold for 2 days)  [] Brilinta (Hold for 7 days)    [] Coumadin (Hold for 5 days) - (INR needs to be drawn the day prior to procedure- INR < 2.0)    [] Aggrenox (Hold for 7 days)        [] Patient will stop medication on their own.    [] Blood Thinner Form Faxed for approval to hold.    Provider form faxed to:   Assessment Completed by:  Electronically signed by Padmini Del Rosario on 2/4/2021 at 12:41 PM

## 2021-02-05 RX ORDER — OXYCODONE AND ACETAMINOPHEN 10; 325 MG/1; MG/1
1 TABLET ORAL EVERY 8 HOURS PRN
Qty: 90 TABLET | Refills: 0 | Status: SHIPPED | OUTPATIENT
Start: 2021-02-08 | End: 2021-03-01

## 2021-02-08 ENCOUNTER — TELEPHONE (OUTPATIENT)
Dept: PAIN MANAGEMENT | Age: 46
End: 2021-02-08

## 2021-02-17 ENCOUNTER — APPOINTMENT (OUTPATIENT)
Dept: GENERAL RADIOLOGY | Age: 46
End: 2021-02-17
Payer: MEDICARE

## 2021-02-17 ENCOUNTER — HOSPITAL ENCOUNTER (OUTPATIENT)
Age: 46
Setting detail: OBSERVATION
Discharge: HOME | End: 2021-02-18
Attending: EMERGENCY MEDICINE
Payer: MEDICARE

## 2021-02-17 DIAGNOSIS — R07.9 CHEST PAIN, UNSPECIFIED TYPE: Primary | ICD-10-CM

## 2021-02-17 DIAGNOSIS — R55 SYNCOPE, UNSPECIFIED SYNCOPE TYPE: ICD-10-CM

## 2021-02-17 LAB
ALBUMIN SERPL-MCNC: 4.1 G/DL (ref 3.5–5.2)
ALP BLD-CCNC: 81 U/L (ref 35–104)
ALT SERPL-CCNC: 34 U/L (ref 5–33)
ANION GAP SERPL CALCULATED.3IONS-SCNC: 12 MMOL/L (ref 7–19)
AST SERPL-CCNC: 27 U/L (ref 5–32)
BASOPHILS ABSOLUTE: 0 K/UL (ref 0–0.2)
BASOPHILS RELATIVE PERCENT: 0.5 % (ref 0–1)
BILIRUB SERPL-MCNC: <0.2 MG/DL (ref 0.2–1.2)
BUN BLDV-MCNC: 11 MG/DL (ref 6–20)
CALCIUM SERPL-MCNC: 8.8 MG/DL (ref 8.6–10)
CHLORIDE BLD-SCNC: 100 MMOL/L (ref 98–111)
CO2: 28 MMOL/L (ref 22–29)
CREAT SERPL-MCNC: 0.8 MG/DL (ref 0.5–0.9)
D DIMER: 0.41 UG/ML FEU (ref 0–0.48)
EOSINOPHILS ABSOLUTE: 0.2 K/UL (ref 0–0.6)
EOSINOPHILS RELATIVE PERCENT: 2 % (ref 0–5)
GFR AFRICAN AMERICAN: >59
GFR NON-AFRICAN AMERICAN: >60
GLUCOSE BLD-MCNC: 78 MG/DL (ref 74–109)
HCG QUALITATIVE: NEGATIVE
HCT VFR BLD CALC: 42 % (ref 37–47)
HEMOGLOBIN: 13 G/DL (ref 12–16)
IMMATURE GRANULOCYTES #: 0 K/UL
LYMPHOCYTES ABSOLUTE: 2.5 K/UL (ref 1.1–4.5)
LYMPHOCYTES RELATIVE PERCENT: 28.8 % (ref 20–40)
MCH RBC QN AUTO: 33.1 PG (ref 27–31)
MCHC RBC AUTO-ENTMCNC: 31 G/DL (ref 33–37)
MCV RBC AUTO: 106.9 FL (ref 81–99)
MONOCYTES ABSOLUTE: 0.7 K/UL (ref 0–0.9)
MONOCYTES RELATIVE PERCENT: 7.7 % (ref 0–10)
NEUTROPHILS ABSOLUTE: 5.3 K/UL (ref 1.5–7.5)
NEUTROPHILS RELATIVE PERCENT: 60.8 % (ref 50–65)
PDW BLD-RTO: 13.7 % (ref 11.5–14.5)
PLATELET # BLD: 228 K/UL (ref 130–400)
PMV BLD AUTO: 10.5 FL (ref 9.4–12.3)
POTASSIUM REFLEX MAGNESIUM: 3.9 MMOL/L (ref 3.5–5)
PRO-BNP: 832 PG/ML (ref 0–450)
RBC # BLD: 3.93 M/UL (ref 4.2–5.4)
SARS-COV-2, NAAT: NOT DETECTED
SODIUM BLD-SCNC: 140 MMOL/L (ref 136–145)
TOTAL PROTEIN: 7 G/DL (ref 6.6–8.7)
TROPONIN: <0.01 NG/ML (ref 0–0.03)
TROPONIN: <0.01 NG/ML (ref 0–0.03)
WBC # BLD: 8.7 K/UL (ref 4.8–10.8)

## 2021-02-17 PROCEDURE — 85379 FIBRIN DEGRADATION QUANT: CPT

## 2021-02-17 PROCEDURE — 36415 COLL VENOUS BLD VENIPUNCTURE: CPT

## 2021-02-17 PROCEDURE — 84703 CHORIONIC GONADOTROPIN ASSAY: CPT

## 2021-02-17 PROCEDURE — 84484 ASSAY OF TROPONIN QUANT: CPT

## 2021-02-17 PROCEDURE — 83880 ASSAY OF NATRIURETIC PEPTIDE: CPT

## 2021-02-17 PROCEDURE — 85025 COMPLETE CBC W/AUTO DIFF WBC: CPT

## 2021-02-17 PROCEDURE — 80053 COMPREHEN METABOLIC PANEL: CPT

## 2021-02-17 PROCEDURE — 87635 SARS-COV-2 COVID-19 AMP PRB: CPT

## 2021-02-17 PROCEDURE — 6370000000 HC RX 637 (ALT 250 FOR IP): Performed by: NURSE PRACTITIONER

## 2021-02-17 PROCEDURE — 71045 X-RAY EXAM CHEST 1 VIEW: CPT

## 2021-02-17 RX ORDER — ASPIRIN 81 MG/1
162 TABLET, CHEWABLE ORAL ONCE
Status: COMPLETED | OUTPATIENT
Start: 2021-02-17 | End: 2021-02-17

## 2021-02-17 RX ORDER — OXYCODONE AND ACETAMINOPHEN 10; 325 MG/1; MG/1
1 TABLET ORAL ONCE
Status: COMPLETED | OUTPATIENT
Start: 2021-02-17 | End: 2021-02-17

## 2021-02-17 RX ORDER — ASPIRIN 325 MG
325 TABLET ORAL ONCE
Status: DISCONTINUED | OUTPATIENT
Start: 2021-02-17 | End: 2021-02-17

## 2021-02-17 RX ORDER — NITROGLYCERIN 0.4 MG/1
0.4 TABLET SUBLINGUAL EVERY 5 MIN PRN
Status: DISCONTINUED | OUTPATIENT
Start: 2021-02-17 | End: 2021-02-19 | Stop reason: HOSPADM

## 2021-02-17 RX ADMIN — OXYCODONE HYDROCHLORIDE AND ACETAMINOPHEN 1 TABLET: 10; 325 TABLET ORAL at 21:12

## 2021-02-17 RX ADMIN — ASPIRIN 162 MG: 81 TABLET, CHEWABLE ORAL at 20:39

## 2021-02-17 RX ADMIN — NITROGLYCERIN 0.4 MG: 0.4 TABLET, ORALLY DISINTEGRATING SUBLINGUAL at 20:41

## 2021-02-17 ASSESSMENT — HEART SCORE
ECG: 1
ECG: 1

## 2021-02-17 ASSESSMENT — ENCOUNTER SYMPTOMS: VOMITING: 0

## 2021-02-17 ASSESSMENT — PAIN DESCRIPTION - DESCRIPTORS: DESCRIPTORS: ACHING

## 2021-02-17 ASSESSMENT — PAIN SCALES - GENERAL
PAINLEVEL_OUTOF10: 9
PAINLEVEL_OUTOF10: 9

## 2021-02-18 ENCOUNTER — APPOINTMENT (OUTPATIENT)
Dept: NUCLEAR MEDICINE | Age: 46
End: 2021-02-18
Payer: MEDICARE

## 2021-02-18 PROBLEM — E78.5 DYSLIPIDEMIA: Status: ACTIVE | Noted: 2021-02-18

## 2021-02-18 PROBLEM — I69.319 COGNITIVE DEFICIT AS LATE EFFECT OF CEREBROVASCULAR ACCIDENT (CVA): Status: ACTIVE | Noted: 2021-02-18

## 2021-02-18 PROBLEM — R07.9 CHEST PAIN WITH MODERATE RISK FOR CARDIAC ETIOLOGY: Status: ACTIVE | Noted: 2021-02-18

## 2021-02-18 PROBLEM — F41.1 GENERALIZED ANXIETY DISORDER: Status: ACTIVE | Noted: 2021-02-18

## 2021-02-18 LAB
ANION GAP SERPL CALCULATED.3IONS-SCNC: 11 MMOL/L (ref 7–19)
BUN BLDV-MCNC: 14 MG/DL (ref 6–20)
CALCIUM SERPL-MCNC: 8.9 MG/DL (ref 8.6–10)
CHLORIDE BLD-SCNC: 101 MMOL/L (ref 98–111)
CO2: 28 MMOL/L (ref 22–29)
CREAT SERPL-MCNC: 0.8 MG/DL (ref 0.5–0.9)
EKG P AXIS: 45 DEGREES
EKG P-R INTERVAL: 196 MS
EKG Q-T INTERVAL: 494 MS
EKG QRS DURATION: 104 MS
EKG QTC CALCULATION (BAZETT): 487 MS
EKG T AXIS: 82 DEGREES
GFR AFRICAN AMERICAN: >59
GFR NON-AFRICAN AMERICAN: >60
GLUCOSE BLD-MCNC: 98 MG/DL (ref 74–109)
HCT VFR BLD CALC: 37.8 % (ref 37–47)
HEMOGLOBIN: 12.1 G/DL (ref 12–16)
MAGNESIUM: 1.9 MG/DL (ref 1.6–2.6)
MCH RBC QN AUTO: 33.1 PG (ref 27–31)
MCHC RBC AUTO-ENTMCNC: 32 G/DL (ref 33–37)
MCV RBC AUTO: 103.3 FL (ref 81–99)
PDW BLD-RTO: 13.5 % (ref 11.5–14.5)
PHOSPHORUS: 3.8 MG/DL (ref 2.5–4.5)
PLATELET # BLD: 225 K/UL (ref 130–400)
PMV BLD AUTO: 10.1 FL (ref 9.4–12.3)
POTASSIUM SERPL-SCNC: 3.9 MMOL/L (ref 3.5–5)
RBC # BLD: 3.66 M/UL (ref 4.2–5.4)
SODIUM BLD-SCNC: 140 MMOL/L (ref 136–145)
TROPONIN: <0.01 NG/ML (ref 0–0.03)
WBC # BLD: 7.1 K/UL (ref 4.8–10.8)

## 2021-02-18 PROCEDURE — 84100 ASSAY OF PHOSPHORUS: CPT

## 2021-02-18 PROCEDURE — 2580000003 HC RX 258: Performed by: HOSPITALIST

## 2021-02-18 PROCEDURE — 93005 ELECTROCARDIOGRAM TRACING: CPT | Performed by: NURSE PRACTITIONER

## 2021-02-18 PROCEDURE — 94640 AIRWAY INHALATION TREATMENT: CPT

## 2021-02-18 PROCEDURE — G0378 HOSPITAL OBSERVATION PER HR: HCPCS

## 2021-02-18 PROCEDURE — 99285 EMERGENCY DEPT VISIT HI MDM: CPT

## 2021-02-18 PROCEDURE — 93017 CV STRESS TEST TRACING ONLY: CPT

## 2021-02-18 PROCEDURE — 36415 COLL VENOUS BLD VENIPUNCTURE: CPT

## 2021-02-18 PROCEDURE — 6360000002 HC RX W HCPCS: Performed by: STUDENT IN AN ORGANIZED HEALTH CARE EDUCATION/TRAINING PROGRAM

## 2021-02-18 PROCEDURE — 83735 ASSAY OF MAGNESIUM: CPT

## 2021-02-18 PROCEDURE — 80048 BASIC METABOLIC PNL TOTAL CA: CPT

## 2021-02-18 PROCEDURE — 6370000000 HC RX 637 (ALT 250 FOR IP): Performed by: HOSPITALIST

## 2021-02-18 PROCEDURE — 85027 COMPLETE CBC AUTOMATED: CPT

## 2021-02-18 PROCEDURE — 84484 ASSAY OF TROPONIN QUANT: CPT

## 2021-02-18 PROCEDURE — 3430000000 HC RX DIAGNOSTIC RADIOPHARMACEUTICAL: Performed by: STUDENT IN AN ORGANIZED HEALTH CARE EDUCATION/TRAINING PROGRAM

## 2021-02-18 PROCEDURE — A9500 TC99M SESTAMIBI: HCPCS | Performed by: STUDENT IN AN ORGANIZED HEALTH CARE EDUCATION/TRAINING PROGRAM

## 2021-02-18 PROCEDURE — 6360000002 HC RX W HCPCS: Performed by: HOSPITALIST

## 2021-02-18 RX ORDER — OXYCODONE AND ACETAMINOPHEN 10; 325 MG/1; MG/1
1 TABLET ORAL EVERY 8 HOURS PRN
Status: DISCONTINUED | OUTPATIENT
Start: 2021-02-18 | End: 2021-02-19 | Stop reason: HOSPADM

## 2021-02-18 RX ORDER — ALBUTEROL SULFATE 2.5 MG/3ML
1.25 SOLUTION RESPIRATORY (INHALATION) EVERY 6 HOURS PRN
Status: DISCONTINUED | OUTPATIENT
Start: 2021-02-18 | End: 2021-02-19 | Stop reason: HOSPADM

## 2021-02-18 RX ORDER — CHOLECALCIFEROL (VITAMIN D3) 125 MCG
1000 CAPSULE ORAL DAILY
Status: DISCONTINUED | OUTPATIENT
Start: 2021-02-18 | End: 2021-02-19 | Stop reason: HOSPADM

## 2021-02-18 RX ORDER — ATORVASTATIN CALCIUM 40 MG/1
40 TABLET, FILM COATED ORAL DAILY
Status: DISCONTINUED | OUTPATIENT
Start: 2021-02-18 | End: 2021-02-19 | Stop reason: HOSPADM

## 2021-02-18 RX ORDER — OXYCODONE AND ACETAMINOPHEN 7.5; 325 MG/1; MG/1
1 TABLET ORAL EVERY 4 HOURS PRN
Status: DISCONTINUED | OUTPATIENT
Start: 2021-02-18 | End: 2021-02-18

## 2021-02-18 RX ORDER — FLUOXETINE 10 MG/1
20 CAPSULE ORAL DAILY
Status: DISCONTINUED | OUTPATIENT
Start: 2021-02-18 | End: 2021-02-19 | Stop reason: HOSPADM

## 2021-02-18 RX ORDER — ASPIRIN 81 MG/1
81 TABLET ORAL DAILY
COMMUNITY

## 2021-02-18 RX ORDER — PROMETHAZINE HYDROCHLORIDE 12.5 MG/1
12.5 TABLET ORAL EVERY 6 HOURS PRN
Status: DISCONTINUED | OUTPATIENT
Start: 2021-02-18 | End: 2021-02-19 | Stop reason: HOSPADM

## 2021-02-18 RX ORDER — DOXEPIN HYDROCHLORIDE 25 MG/1
25 CAPSULE ORAL NIGHTLY
Status: DISCONTINUED | OUTPATIENT
Start: 2021-02-18 | End: 2021-02-19 | Stop reason: HOSPADM

## 2021-02-18 RX ORDER — MAGNESIUM SULFATE 1 G/100ML
1000 INJECTION INTRAVENOUS PRN
Status: DISCONTINUED | OUTPATIENT
Start: 2021-02-18 | End: 2021-02-19 | Stop reason: HOSPADM

## 2021-02-18 RX ORDER — NITROGLYCERIN 0.4 MG/1
0.4 TABLET SUBLINGUAL EVERY 5 MIN PRN
Status: DISCONTINUED | OUTPATIENT
Start: 2021-02-18 | End: 2021-02-19 | Stop reason: HOSPADM

## 2021-02-18 RX ORDER — ASPIRIN 81 MG/1
81 TABLET ORAL DAILY
Status: DISCONTINUED | OUTPATIENT
Start: 2021-02-18 | End: 2021-02-18 | Stop reason: SDUPTHER

## 2021-02-18 RX ORDER — CLONIDINE HYDROCHLORIDE 0.1 MG/1
0.1 TABLET ORAL 2 TIMES DAILY
Status: DISCONTINUED | OUTPATIENT
Start: 2021-02-18 | End: 2021-02-19 | Stop reason: HOSPADM

## 2021-02-18 RX ORDER — LOSARTAN POTASSIUM 50 MG/1
50 TABLET ORAL DAILY
COMMUNITY
End: 2021-03-16

## 2021-02-18 RX ORDER — POLYETHYLENE GLYCOL 3350 17 G/17G
17 POWDER, FOR SOLUTION ORAL DAILY PRN
Status: DISCONTINUED | OUTPATIENT
Start: 2021-02-18 | End: 2021-02-19 | Stop reason: HOSPADM

## 2021-02-18 RX ORDER — ASPIRIN 81 MG/1
81 TABLET, CHEWABLE ORAL DAILY
Status: DISCONTINUED | OUTPATIENT
Start: 2021-02-19 | End: 2021-02-19 | Stop reason: HOSPADM

## 2021-02-18 RX ORDER — SODIUM CHLORIDE 0.9 % (FLUSH) 0.9 %
10 SYRINGE (ML) INJECTION PRN
Status: DISCONTINUED | OUTPATIENT
Start: 2021-02-18 | End: 2021-02-19 | Stop reason: HOSPADM

## 2021-02-18 RX ORDER — FAMOTIDINE 20 MG/1
20 TABLET, FILM COATED ORAL 2 TIMES DAILY
Status: DISCONTINUED | OUTPATIENT
Start: 2021-02-18 | End: 2021-02-19 | Stop reason: HOSPADM

## 2021-02-18 RX ORDER — LACTULOSE 10 G/15ML
10 SOLUTION ORAL EVERY EVENING
Status: DISCONTINUED | OUTPATIENT
Start: 2021-02-18 | End: 2021-02-19 | Stop reason: HOSPADM

## 2021-02-18 RX ORDER — LOSARTAN POTASSIUM 25 MG/1
50 TABLET ORAL DAILY
Status: DISCONTINUED | OUTPATIENT
Start: 2021-02-18 | End: 2021-02-19 | Stop reason: HOSPADM

## 2021-02-18 RX ORDER — BUDESONIDE 0.5 MG/2ML
0.5 INHALANT ORAL 2 TIMES DAILY
Status: DISCONTINUED | OUTPATIENT
Start: 2021-02-18 | End: 2021-02-19 | Stop reason: HOSPADM

## 2021-02-18 RX ORDER — BUDESONIDE AND FORMOTEROL FUMARATE DIHYDRATE 160; 4.5 UG/1; UG/1
2 AEROSOL RESPIRATORY (INHALATION) 2 TIMES DAILY
Status: DISCONTINUED | OUTPATIENT
Start: 2021-02-18 | End: 2021-02-18 | Stop reason: CLARIF

## 2021-02-18 RX ORDER — POTASSIUM CHLORIDE 7.45 MG/ML
10 INJECTION INTRAVENOUS PRN
Status: DISCONTINUED | OUTPATIENT
Start: 2021-02-18 | End: 2021-02-19 | Stop reason: HOSPADM

## 2021-02-18 RX ORDER — ALPRAZOLAM 0.25 MG/1
0.25 TABLET ORAL 3 TIMES DAILY PRN
Status: DISCONTINUED | OUTPATIENT
Start: 2021-02-18 | End: 2021-02-18

## 2021-02-18 RX ORDER — DOXEPIN HYDROCHLORIDE 25 MG/1
25 CAPSULE ORAL NIGHTLY
Status: DISCONTINUED | OUTPATIENT
Start: 2021-02-18 | End: 2021-02-18

## 2021-02-18 RX ORDER — PREGABALIN 25 MG/1
75 CAPSULE ORAL 2 TIMES DAILY
Status: DISCONTINUED | OUTPATIENT
Start: 2021-02-18 | End: 2021-02-19 | Stop reason: HOSPADM

## 2021-02-18 RX ORDER — MECOBALAMIN 5000 MCG
5 TABLET,DISINTEGRATING ORAL NIGHTLY
Status: DISCONTINUED | OUTPATIENT
Start: 2021-02-18 | End: 2021-02-19 | Stop reason: HOSPADM

## 2021-02-18 RX ORDER — SODIUM CHLORIDE 0.9 % (FLUSH) 0.9 %
10 SYRINGE (ML) INJECTION EVERY 12 HOURS SCHEDULED
Status: DISCONTINUED | OUTPATIENT
Start: 2021-02-18 | End: 2021-02-19 | Stop reason: HOSPADM

## 2021-02-18 RX ORDER — IPRATROPIUM BROMIDE AND ALBUTEROL SULFATE 2.5; .5 MG/3ML; MG/3ML
1 SOLUTION RESPIRATORY (INHALATION) EVERY 4 HOURS PRN
Status: DISCONTINUED | OUTPATIENT
Start: 2021-02-18 | End: 2021-02-19 | Stop reason: HOSPADM

## 2021-02-18 RX ORDER — OMEGA-3S/DHA/EPA/FISH OIL/D3 300MG-1000
400 CAPSULE ORAL DAILY
Status: DISCONTINUED | OUTPATIENT
Start: 2021-02-18 | End: 2021-02-19 | Stop reason: HOSPADM

## 2021-02-18 RX ORDER — ARFORMOTEROL TARTRATE 15 UG/2ML
15 SOLUTION RESPIRATORY (INHALATION) 2 TIMES DAILY
Status: DISCONTINUED | OUTPATIENT
Start: 2021-02-18 | End: 2021-02-19 | Stop reason: HOSPADM

## 2021-02-18 RX ORDER — POTASSIUM CHLORIDE 20 MEQ/1
40 TABLET, EXTENDED RELEASE ORAL PRN
Status: DISCONTINUED | OUTPATIENT
Start: 2021-02-18 | End: 2021-02-19 | Stop reason: HOSPADM

## 2021-02-18 RX ORDER — SENNA AND DOCUSATE SODIUM 50; 8.6 MG/1; MG/1
1 TABLET, FILM COATED ORAL DAILY
Status: DISCONTINUED | OUTPATIENT
Start: 2021-02-18 | End: 2021-02-19 | Stop reason: HOSPADM

## 2021-02-18 RX ORDER — ATENOLOL 50 MG/1
50 TABLET ORAL DAILY
Status: DISCONTINUED | OUTPATIENT
Start: 2021-02-18 | End: 2021-02-19 | Stop reason: HOSPADM

## 2021-02-18 RX ORDER — FUROSEMIDE 20 MG/1
20 TABLET ORAL 2 TIMES DAILY
Status: DISCONTINUED | OUTPATIENT
Start: 2021-02-18 | End: 2021-02-19 | Stop reason: HOSPADM

## 2021-02-18 RX ORDER — ONDANSETRON 2 MG/ML
4 INJECTION INTRAMUSCULAR; INTRAVENOUS EVERY 6 HOURS PRN
Status: DISCONTINUED | OUTPATIENT
Start: 2021-02-18 | End: 2021-02-19 | Stop reason: HOSPADM

## 2021-02-18 RX ADMIN — OXYCODONE HYDROCHLORIDE AND ACETAMINOPHEN 1 TABLET: 10; 325 TABLET ORAL at 10:55

## 2021-02-18 RX ADMIN — BUDESONIDE 500 MCG: 0.5 SUSPENSION RESPIRATORY (INHALATION) at 19:06

## 2021-02-18 RX ADMIN — LACTULOSE 10 G: 20 SOLUTION ORAL at 17:51

## 2021-02-18 RX ADMIN — FUROSEMIDE 20 MG: 20 TABLET ORAL at 17:51

## 2021-02-18 RX ADMIN — DOCUSATE SODIUM 50 MG AND SENNOSIDES 8.6 MG 1 TABLET: 8.6; 5 TABLET, FILM COATED ORAL at 10:45

## 2021-02-18 RX ADMIN — REGADENOSON 0.4 MG: 0.08 INJECTION, SOLUTION INTRAVENOUS at 09:45

## 2021-02-18 RX ADMIN — TETRAKIS(2-METHOXYISOBUTYLISOCYANIDE)COPPER(I) TETRAFLUOROBORATE 10 MILLICURIE: 1 INJECTION, POWDER, LYOPHILIZED, FOR SOLUTION INTRAVENOUS at 10:21

## 2021-02-18 RX ADMIN — OXYCODONE HYDROCHLORIDE AND ACETAMINOPHEN 1 TABLET: 10; 325 TABLET ORAL at 19:36

## 2021-02-18 RX ADMIN — PREGABALIN 75 MG: 25 CAPSULE ORAL at 19:40

## 2021-02-18 RX ADMIN — SODIUM CHLORIDE, PRESERVATIVE FREE 10 ML: 5 INJECTION INTRAVENOUS at 10:54

## 2021-02-18 RX ADMIN — ATORVASTATIN CALCIUM 40 MG: 40 TABLET, FILM COATED ORAL at 10:46

## 2021-02-18 RX ADMIN — FAMOTIDINE 20 MG: 20 TABLET, FILM COATED ORAL at 10:45

## 2021-02-18 RX ADMIN — FAMOTIDINE 20 MG: 20 TABLET, FILM COATED ORAL at 19:40

## 2021-02-18 RX ADMIN — ARFORMOTEROL TARTRATE 15 MCG: 15 SOLUTION RESPIRATORY (INHALATION) at 19:06

## 2021-02-18 RX ADMIN — CLONIDINE HYDROCHLORIDE 0.1 MG: 0.1 TABLET ORAL at 10:46

## 2021-02-18 RX ADMIN — ARFORMOTEROL TARTRATE 15 MCG: 15 SOLUTION RESPIRATORY (INHALATION) at 07:18

## 2021-02-18 RX ADMIN — TETRAKIS(2-METHOXYISOBUTYLISOCYANIDE)COPPER(I) TETRAFLUOROBORATE 30 MILLICURIE: 1 INJECTION, POWDER, LYOPHILIZED, FOR SOLUTION INTRAVENOUS at 10:22

## 2021-02-18 RX ADMIN — DOXEPIN HYDROCHLORIDE 25 MG: 25 CAPSULE ORAL at 19:43

## 2021-02-18 RX ADMIN — SODIUM CHLORIDE, PRESERVATIVE FREE 10 ML: 5 INJECTION INTRAVENOUS at 19:41

## 2021-02-18 RX ADMIN — CYANOCOBALAMIN TAB 500 MCG 1000 MCG: 500 TAB at 10:46

## 2021-02-18 RX ADMIN — Medication 5 MG: at 19:40

## 2021-02-18 RX ADMIN — BUDESONIDE 500 MCG: 0.5 SUSPENSION RESPIRATORY (INHALATION) at 07:18

## 2021-02-18 RX ADMIN — FLUOXETINE 20 MG: 10 CAPSULE ORAL at 10:50

## 2021-02-18 RX ADMIN — CHOLECALCIFEROL TAB 10 MCG (400 UNIT) 400 UNITS: 10 TAB at 10:46

## 2021-02-18 RX ADMIN — PREGABALIN 75 MG: 25 CAPSULE ORAL at 10:45

## 2021-02-18 RX ADMIN — DOXEPIN HYDROCHLORIDE 25 MG: 25 CAPSULE ORAL at 02:57

## 2021-02-18 ASSESSMENT — PAIN SCALES - GENERAL
PAINLEVEL_OUTOF10: 10
PAINLEVEL_OUTOF10: 5
PAINLEVEL_OUTOF10: 5
PAINLEVEL_OUTOF10: 8

## 2021-02-18 NOTE — DISCHARGE SUMMARY
Discharge Summary    NAME: Stephanie Vora  :  1975  MRN:  [id number]    Admit date:  2021  Discharge date:  2021    Admitting Physician:  Asael Casanova MD    Advance Directive: Full Code    Consults: none    Primary Care Physician:  MELIDA Singer    Discharge Diagnoses:  Principal Problem:    Chest pain with moderate risk for cardiac etiology    Cognitive deficit as late effect of cerebrovascular accident (CVA)    Generalized anxiety disorder    Dyslipidemia      Significant Diagnostic Studies:   Xr Chest Portable    Result Date: 2021  XR CHEST PORTABLE 2021 8:12 PM History: Chest pain. Portable chest x-ray compared with 2020. Median sternotomy changes. The heart size is normal. The mediastinum is within normal limits. The lungs are adequately expanded with no pneumonia or pneumothorax. There is no significant pleural fluid. No congestive failure changes. 1. No acute disease. Signed by Dr Sarah Metzger on 2021 8:12 PM      Pertinent Labs:   CBC:   Recent Labs     21  0452   WBC 8.7 7.1   HGB 13.0 12.1    225     BMP:    Recent Labs     21  0452    140   K 3.9 3.9    101   CO2 28 28   BUN 11 14   CREATININE 0.8 0.8   GLUCOSE 78 98           Procedures:      Farhat Araiza Nuclear Stress Test Report   Procedure date: 2021   Indications: chest pain   Procedure: Stress was performed with injection of 0.4 mg Lexiscan. Vital signs and EKG were monitored. Technetium-99 sestamibi was   injected in divided doses, 10.6 mCi and 31.7 mCi respectively for rest   and stress imaging. The patient was discharged in stable condition. Results: Patient had symptoms of left shoulder pain, neck pain,   shortness of breath, during infusion that resolved in recovery. Baseline EKG showed normal sinus rhythm without ST/T changes.  During   stress there were no significant EKG changes or rhythm changes. Baseline and peak blood pressures were 145/90, and 218/88   respectively.  Baseline and peak heart rates were 50 and  72   respectively. Lexiscan/Cardiolyte Nuclear Medicine Report   Date of Procedure: 2/18/2021   The patient was injected with 11.5 millicuries (mCi) of Technetium   (Tc99m).  After an appropriate level of stress the patient was   re-injected with 72.9 millicuries (mCi) of Technetium (Tc99m).  Repeat   gated images were then performed per standard protocol. Findings:   1.  Analysis of the the stress and rest images reveals small apical   fixed defect without significant reversibility. 2.  Analysis of the gated images reveals grossly normal left   ventricular function with a calculated ejection fraction of 68 %.                                     Hospital Course:    79-year-old female with anxiety/depression, chronic tobacco use and history of stroke presented evening of 2/17 with anterior chest pain radiating to the back associated with palpitations but no dyspnea. EKG without ischemic changes and troponins trended and negative. Underwent Lexiscan stress test on 2/18/2021 with results showing no obvious ischemia. Chest pain resolved. Patient stable for discharge 2/18/2021 with recommendation to contact PCP for close outpatient follow-up.           Physical Exam:  Vital Signs: BP (!) 135/49   Pulse 60   Temp 97 °F (36.1 °C) (Temporal)   Resp 16   Ht 5' 2\" (1.575 m)   Wt 149 lb 6.4 oz (67.8 kg)   SpO2 93%   BMI 27.33 kg/m²   General: No acute distress  Neck: Supple, nontender  Head: Normocephalic/atraumatic  Cardiovascular: Normal rate with regular rhythm, peripheral pulses symmetric  Respiratory: Lungs clear to auscultation bilaterally  Abdomen: Soft, nontender, bowel sounds present  Neurologic: No focal deficits  Musculoskeletal: No deformities  Extremities: No clubbing or cyanosis    Discharge Medications:       [facility], [address] Medication Instructions LEQ:[id number]    Printed on:02/18/21 0706   Medication Information                      albuterol (ACCUNEB) 1.25 MG/3ML nebulizer solution  Inhale 3 mLs into the lungs every 6 hours as needed for Wheezing             albuterol sulfate HFA (VENTOLIN HFA) 108 (90 Base) MCG/ACT inhaler  Inhale 2 puffs into the lungs 4 times daily as needed for Wheezing             aspirin 81 MG EC tablet  Take 81 mg by mouth daily             atenolol (TENORMIN) 50 MG tablet  Take 50 mg by mouth daily             atorvastatin (LIPITOR) 40 MG tablet  Take 40 mg by mouth daily             cloNIDine (CATAPRES) 0.1 MG tablet  Take 0.1 mg by mouth 2 times daily as needed              doxepin (SINEQUAN) 25 MG capsule  Take 1 capsule by mouth nightly             FLUoxetine (PROZAC) 20 MG capsule  Take 1 capsule by mouth daily             fluticasone-salmeterol (ADVAIR HFA) 115-21 MCG/ACT inhaler  Inhale 2 puffs into the lungs 2 times daily             furosemide (LASIX) 20 MG tablet  Take 1 tablet by mouth 2 times daily             guaiFENesin (EXPECTORANT COUGH CONTROL PO)  Take by mouth             lactulose (CHRONULAC) 10 GM/15ML solution  Take 15 mLs by mouth every evening             losartan (COZAAR) 50 MG tablet  Take 50 mg by mouth daily             melatonin 3 MG TABS tablet  Take 3 mg by mouth daily             oxyCODONE-acetaminophen (PERCOCET)  MG per tablet  Take 1 tablet by mouth every 8 hours as needed for Pain for up to 30 days. May fill 2/8/21             Potassium 99 MG TABS  Take by mouth             pregabalin (LYRICA) 75 MG capsule  Take 1 capsule by mouth 2 times daily for 90 days.              sennosides-docusate sodium (SENOKOT-S) 8.6-50 MG tablet  Take 1 tablet by mouth daily             vitamin B-12 (CYANOCOBALAMIN) 1000 MCG tablet  Take 1,000 mcg by mouth daily             vitamin D3 (CHOLECALCIFEROL) 10 MCG (400 UNIT) TABS tablet  Take 400 Units by mouth daily

## 2021-02-18 NOTE — PROGRESS NOTES
Daily Progress Note    Date:2021  Patient: Bettina Marquez  : 1975  PUY:[id number]  CODE:Full Code No additional code details  MELIDA Richardson    Admit Date: 2021  7:14 PM   LOS: 0 days       Subjective:    74-year-old female with chronic tobacco use and history of stroke presented evening of  with anterior chest pain radiating to the back associated with palpitations but no dyspnea. EKG without ischemic changes and troponins trended and negative. She is somewhat anxious but otherwise chest pain improved since presentation.       Review of Systems    Comprehensive ROS completed and is negative except as otherwise noted      Objective:      Vital signs in last 24 hours:  Patient Vitals for the past 24 hrs:   BP Temp Temp src Pulse Resp SpO2 Height Weight   21 0130 (!) 135/49 97 °F (36.1 °C) Temporal 60 16 93 % 5' 2\" (1.575 m) 149 lb 6.4 oz (67.8 kg)   21 0105 (!) 114/50 -- -- 61 11 90 % -- --   21 0035 (!) 116/59 -- -- 57 13 (!) 89 % -- --   21 2335 105/60 -- -- 78 26 92 % -- --   21 2305 137/69 -- -- 66 14 92 % -- --   21 2235 133/72 -- -- 56 13 90 % -- --   21 2205 131/76 -- -- 60 16 91 % -- --   21 2135 125/64 -- -- 62 16 92 % -- --   21 2100 121/77 -- -- 66 21 91 % -- --   21 2035 120/64 -- -- 62 12 95 % -- --   21 100/63 -- -- 62 12 92 % -- --   21 105/80 -- -- 67 23 94 % -- --   21 (!) 112/57 -- -- 64 15 93 % -- --   21 117/89 -- -- 73 27 93 % -- --   21 1901 (!) 151/82 96.7 °F (35.9 °C) -- 76 20 96 % -- --     Physical exam    General: No acute distress  Neck: Supple, nontender  Head: Normocephalic/atraumatic  Cardiovascular: Normal rate with regular rhythm, peripheral pulses symmetric  Respiratory: Lungs clear to auscultation bilaterally  Abdomen: Soft, nontender, bowel sounds present  Neurologic: No focal deficits  Musculoskeletal: No deformities  Extremities: No clubbing or cyanosis      Lab Review   Recent Results (from the past 24 hour(s))   CBC Auto Differential    Collection Time: 02/17/21  8:24 PM   Result Value Ref Range    WBC 8.7 4.8 - 10.8 K/uL    RBC 3.93 (L) 4.20 - 5.40 M/uL    Hemoglobin 13.0 12.0 - 16.0 g/dL    Hematocrit 42.0 37.0 - 47.0 %    .9 (H) 81.0 - 99.0 fL    MCH 33.1 (H) 27.0 - 31.0 pg    MCHC 31.0 (L) 33.0 - 37.0 g/dL    RDW 13.7 11.5 - 14.5 %    Platelets 235 404 - 581 K/uL    MPV 10.5 9.4 - 12.3 fL    Neutrophils % 60.8 50.0 - 65.0 %    Lymphocytes % 28.8 20.0 - 40.0 %    Monocytes % 7.7 0.0 - 10.0 %    Eosinophils % 2.0 0.0 - 5.0 %    Basophils % 0.5 0.0 - 1.0 %    Neutrophils Absolute 5.3 1.5 - 7.5 K/uL    Immature Granulocytes # 0.0 K/uL    Lymphocytes Absolute 2.5 1.1 - 4.5 K/uL    Monocytes Absolute 0.70 0.00 - 0.90 K/uL    Eosinophils Absolute 0.20 0.00 - 0.60 K/uL    Basophils Absolute 0.00 0.00 - 0.20 K/uL   Comprehensive Metabolic Panel w/ Reflex to MG    Collection Time: 02/17/21  8:24 PM   Result Value Ref Range    Sodium 140 136 - 145 mmol/L    Potassium reflex Magnesium 3.9 3.5 - 5.0 mmol/L    Chloride 100 98 - 111 mmol/L    CO2 28 22 - 29 mmol/L    Anion Gap 12 7 - 19 mmol/L    Glucose 78 74 - 109 mg/dL    BUN 11 6 - 20 mg/dL    CREATININE 0.8 0.5 - 0.9 mg/dL    GFR Non-African American >60 >60    GFR African American >59 >59    Calcium 8.8 8.6 - 10.0 mg/dL    Total Protein 7.0 6.6 - 8.7 g/dL    Albumin 4.1 3.5 - 5.2 g/dL    Total Bilirubin <0.2 0.2 - 1.2 mg/dL    Alkaline Phosphatase 81 35 - 104 U/L    ALT 34 (H) 5 - 33 U/L    AST 27 5 - 32 U/L   Troponin    Collection Time: 02/17/21  8:45 PM   Result Value Ref Range    Troponin <0.01 0.00 - 0.03 ng/mL   D-Dimer, Quantitative    Collection Time: 02/17/21  8:45 PM   Result Value Ref Range    D-Dimer, Quant 0.41 0.00 - 0.48 ug/mL FEU   HCG Qualitative, Serum    Collection Time: 02/17/21  8:45 PM   Result Value Ref Range    hCG Qual Negative    PROBNP, N-TERMINAL    Collection Time: 02/17/21  8:45 PM   Result Value Ref Range    Pro- (H) 0 - 450 pg/mL   COVID-19, Rapid    Collection Time: 02/17/21  9:10 PM    Specimen: Nasopharyngeal Swab   Result Value Ref Range    SARS-CoV-2, NAAT Not Detected Not Detected   Troponin    Collection Time: 02/17/21 10:47 PM   Result Value Ref Range    Troponin <0.01 0.00 - 0.03 ng/mL   Troponin    Collection Time: 02/18/21  4:52 AM   Result Value Ref Range    Troponin <0.01 0.00 - 0.03 ng/mL   Magnesium    Collection Time: 02/18/21  4:52 AM   Result Value Ref Range    Magnesium 1.9 1.6 - 2.6 mg/dL   Phosphorus    Collection Time: 02/18/21  4:52 AM   Result Value Ref Range    Phosphorus 3.8 2.5 - 4.5 mg/dL   CBC    Collection Time: 02/18/21  4:52 AM   Result Value Ref Range    WBC 7.1 4.8 - 10.8 K/uL    RBC 3.66 (L) 4.20 - 5.40 M/uL    Hemoglobin 12.1 12.0 - 16.0 g/dL    Hematocrit 37.8 37.0 - 47.0 %    .3 (H) 81.0 - 99.0 fL    MCH 33.1 (H) 27.0 - 31.0 pg    MCHC 32.0 (L) 33.0 - 37.0 g/dL    RDW 13.5 11.5 - 14.5 %    Platelets 040 806 - 683 K/uL    MPV 10.1 9.4 - 12.3 fL   Basic Metabolic Panel    Collection Time: 02/18/21  4:52 AM   Result Value Ref Range    Sodium 140 136 - 145 mmol/L    Potassium 3.9 3.5 - 5.0 mmol/L    Chloride 101 98 - 111 mmol/L    CO2 28 22 - 29 mmol/L    Anion Gap 11 7 - 19 mmol/L    Glucose 98 74 - 109 mg/dL    BUN 14 6 - 20 mg/dL    CREATININE 0.8 0.5 - 0.9 mg/dL    GFR Non-African American >60 >60    GFR African American >59 >59    Calcium 8.9 8.6 - 10.0 mg/dL   EKG 12 Lead    Collection Time: 02/18/21  6:27 AM   Result Value Ref Range    P-R Interval 196 ms    QRS Duration 104 ms    Q-T Interval 494 ms    QTc Calculation (Bazett) 487 ms    P Axis 45 degrees    T Axis 82 degrees       No intake/output data recorded. No intake/output data recorded.       Current Facility-Administered Medications:     atorvastatin (LIPITOR) tablet 40 mg, 40 mg, Oral, Daily, Eliseo Miles MD    FLUoxetine (PROZAC) capsule 20 mg, 20 mg, Oral, Daily, Eliseo Miles MD    [Held by provider] furosemide (LASIX) tablet 20 mg, 20 mg, Oral, BID, Eliseo Miles MD    lactulose (CHRONULAC) 10 GM/15ML solution 10 g, 10 g, Oral, QPM, Eliseo Miles MD    oxyCODONE-acetaminophen (PERCOCET)  MG per tablet 1 tablet, 1 tablet, Oral, Q8H PRN, Eliseo Miles MD    pregabalin (LYRICA) capsule 75 mg, 75 mg, Oral, BID, Eliseo Miles MD    sennosides-docusate sodium (SENOKOT-S) 8.6-50 MG tablet 1 tablet, 1 tablet, Oral, Daily, Eliseo Miles MD    vitamin B-12 (CYANOCOBALAMIN) tablet 1,000 mcg, 1,000 mcg, Oral, Daily, Eliseo Miles MD    vitamin D3 (CHOLECALCIFEROL) tablet 400 Units, 400 Units, Oral, Daily, Eliseo Miles MD    doxepin (SINEQUAN) capsule 25 mg, 25 mg, Oral, Nightly, Eliseo Miles MD, 25 mg at 02/18/21 0257    [Held by provider] atenolol (TENORMIN) tablet 50 mg, 50 mg, Oral, Daily, Eliseo Miles MD    melatonin disintegrating tablet 5 mg, 5 mg, Oral, Nightly, Eliseo Miles MD    cloNIDine (CATAPRES) tablet 0.1 mg, 0.1 mg, Oral, BID, Eliseo Miles MD    albuterol (PROVENTIL) nebulizer solution 1.25 mg, 1.25 mg, Nebulization, Q6H PRN, Eliseo Miles MD    ipratropium-albuterol (DUONEB) nebulizer solution 1 ampule, 1 ampule, Inhalation, Q4H PRN, Eliseo Miles MD    Brompheniramine-Pseudoeph 4-60 MG CAPS 1 tablet, 1 tablet, Oral, BID PRN, Eliseo Miles MD    [Held by provider] losartan (COZAAR) tablet 50 mg, 50 mg, Oral, Daily, Eliseo Miles MD    sodium chloride flush 0.9 % injection 10 mL, 10 mL, Intravenous, 2 times per day, Eliseo Miles MD    sodium chloride flush 0.9 % injection 10 mL, 10 mL, Intravenous, PRN, Eliseo Miles MD    famotidine (PEPCID) tablet 20 mg, 20 mg, Oral, BID, Eliseo Miles MD    promethazine (PHENERGAN) tablet 12.5 mg, 12.5 mg, Oral, Q6H PRN **OR** ondansetron (ZOFRAN) injection 4 mg, 4 mg, Intravenous, Q6H PRN, Eliseo Miles MD    polyethylene glycol (GLYCOLAX) packet 17 g, 17 g, Oral, Daily PRN, Eliseo Miles MD    [START ON 2/19/2021] aspirin chewable tablet 81 mg, 81 mg, Oral, Daily, Eliseo Miles MD    potassium chloride (KLOR-CON M) extended release tablet 40 mEq, 40 mEq, Oral, PRN **OR** potassium bicarb-citric acid (EFFER-K) effervescent tablet 40 mEq, 40 mEq, Oral, PRN **OR** potassium chloride 10 mEq/100 mL IVPB (Peripheral Line), 10 mEq, Intravenous, PRN, Eliseo Miles MD    magnesium sulfate 1000 mg in dextrose 5% 100 mL IVPB, 1,000 mg, Intravenous, PRN, Eliseo Miles MD    nitroGLYCERIN (NITROSTAT) SL tablet 0.4 mg, 0.4 mg, Sublingual, Q5 Min PRN, Eliseo Miles MD    budesonide (PULMICORT) nebulizer suspension 500 mcg, 0.5 mg, Nebulization, BID, Eliseo Miles MD    Arformoterol Tartrate (BROVANA) nebulizer solution 15 mcg, 15 mcg, Nebulization, BID, Eliseo Miles MD    nitroGLYCERIN (NITROSTAT) SL tablet 0.4 mg, 0.4 mg, Sublingual, Q5 Min PRN, Eliseo Miles MD, 0.4 mg at 02/17/21 2041        Assessment/plan  Principal Problem:    Chest pain with moderate risk for cardiac etiology  Active Problems:    Cognitive deficit as late effect of cerebrovascular accident (CVA)    Generalized anxiety disorder    Dyslipidemia  Resolved Problems:    * No resolved hospital problems.  *      Chest pain  -Cardiac monitoring, serial EKGs as warranted  -Troponin trended-negative x3  -Guideline directed medical therapy with aspirin and statin  -No need for echo (echo done recently)  -Hold beta-blocker prior to stress test  -Lexiscan stress test today    Continue appropriate home regimen for other chronic issues    DVT prophylaxis Lovenox    Disposition: Potential discharge home today if Lexiscan negative         Manny Rueda MD 2/18/2021 7:06 AM

## 2021-02-18 NOTE — ED PROVIDER NOTES
Attending Supervisory Note/Shared Visit   I have personally performed a face to face diagnostic evaluation on this patient. I have reviewed the mid-levels findings and agree. EKG normal sinus rhythm. Normal QT. Incomplete right bundle. Borderline T wave changes V1 to V2 and 3 and aVF. There are some changes that are concerning for possible ischemia. Briefly, patient's 50-year-old female who presents complaining of intermittent chest pain for the past couple of days. Patient also had episode of syncope about 5 days ago. No dyspnea. Has chronic pain in her legs due to history of claudication and is following with vascular surgery for this. Her leg pain and claudication symptoms are no different today than usual.  Has palpable pulses in her feet. No new unilateral leg swelling or pain. No history of DVT or PE. No fever. No cough. No abdominal pain nausea vomiting or diarrhea. Patient is currently resting comfortably no distress nontoxic on examination. Lungs clear. No murmurs rubs gallops on cardiac auscultation. Patient does have history of aortic valve replacement and open heart surgery x2. Patient's case has been discussed with hospitalist, Dr. Howard Skaggs, who is agreeable plan of care admission. Patient updated about plan. FINAL IMPRESSION      1. Chest pain, unspecified type    2.  Syncope, unspecified syncope type          Walter Gómez MD  Attending Emergency Physician       Walter Gómez MD  02/18/21 3412
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
## 2021-02-18 NOTE — H&P
[place], [place], [address]    DEPARTMENT OF HOSPITALIST MEDICINE      HISTORY & PHYSICAL:          REASON FOR ADMISSION:  Chief Complaint   Patient presents with    Chest Pain     BP bottoms out        HISTORY OF PRESENT ILLNESS:  Velvet Canseco is an 54 y.o. female. She is a very unfortunate female who is a chronic smoker and has a history of stroke in the past which has affected her mentation to a point that it takes her some effort to keep a conversation going. She came to the ER complaining of chest pain intensity 6-7 out of 10 located in the anterior chest radiating to the back associated with some sweating, palpitations but no shortness of breath. Cardiac enzymes were done x2 which are negative. She had multiple risk factors necessitating admission to the medical unit overnight for telemetry monitoring, cardiac evaluation and work-up in the morning. She is also very anxious.     PAST MEDICAL HISTORY:  Past Medical History:   Diagnosis Date    Allergic rhinitis     Cancer ([facility] [address]     cervical, lymphoma    Congenital heart disease     MRSA infection     [facility]roke [facility])     after heart surgery 2013         PAST SURGICAL HISTORY:  Past Surgical History:   Procedure Laterality Date    CARDIAC SURGERY      Ross procedure and surgery prior on valve    PARTIAL HYSTERECTOMY          SOCIAL HISTORY:  Social History     Socioeconomic History    Marital status: Single     Spouse name: None    Number of children: None    Years of education: None    Highest education level: None   Occupational History    None   Social Needs    Financial resource strain: None    Food insecurity     Worry: None     Inability: None    Transportation needs     Medical: None     Non-medical: None   Tobacco Use    Smoking status: Current Every Day Smoker     Packs/day: 1.00     Types: Cigarettes     Start date: 1990    Smokeless tobacco: Never Used   Substance and Sexual Activity    Alcohol use: Never     Frequency: Never     Binge frequency: Never    Drug use: Never    Sexual activity: None   Lifestyle    Physical activity     Days per week: None     Minutes per session: None    Stress: None   Relationships    Social connections     Talks on phone: None     Gets together: None     Attends Sikhism service: None     Active member of club or organization: None     Attends meetings of clubs or organizations: None     Relationship status: None    Intimate partner violence     Fear of current or ex partner: None     Emotionally abused: None     Physically abused: None     Forced sexual activity: None   Other Topics Concern    None   Social History Narrative    None        FAMILY HISTORY:  Family History   Problem Relation Age of Onset    Diabetes Mother     Heart Disease Mother     Cancer Father          ALLERGIES:  Allergies   Allergen Reactions    Ceclor [Cefaclor] Swelling    Lortab [Hydrocodone-Acetaminophen]     Penicillins Hives    Sulfa Antibiotics         PRIOR TO ADMISSION MEDS:  Medications Prior to Admission: aspirin 81 MG EC tablet, Take 81 mg by mouth daily  Potassium 99 MG TABS, Take by mouth  losartan (COZAAR) 50 MG tablet, Take 50 mg by mouth daily  lactulose (CHRONULAC) 10 GM/15ML solution, Take 15 mLs by mouth every evening  doxepin (SINEQUAN) 25 MG capsule, Take 1 capsule by mouth nightly  furosemide (LASIX) 20 MG tablet, Take 1 tablet by mouth 2 times daily  FLUoxetine (PROZAC) 20 MG capsule, Take 1 capsule by mouth daily  oxyCODONE-acetaminophen (PERCOCET)  MG per tablet, Take 1 tablet by mouth every 8 hours as needed for Pain for up to 30 days. May fill 2/8/21  pregabalin (LYRICA) 75 MG capsule, Take 1 capsule by mouth 2 times daily for 90 days.   albuterol sulfate HFA (VENTOLIN HFA) 108 (90 Base) MCG/ACT inhaler, Inhale 2 puffs into the lungs 4 times daily as needed for Wheezing  atorvastatin (LIPITOR) 40 MG tablet, Take 40 mg by mouth daily  atenolol (TENORMIN) 50 MG tablet, Take 50 mg by mouth daily  sennosides-docusate sodium (SENOKOT-S) 8.6-50 MG tablet, Take 1 tablet by mouth daily  vitamin D3 (CHOLECALCIFEROL) 10 MCG (400 UNIT) TABS tablet, Take 400 Units by mouth daily  vitamin B-12 (CYANOCOBALAMIN) 1000 MCG tablet, Take 1,000 mcg by mouth daily  melatonin 3 MG TABS tablet, Take 3 mg by mouth daily  Brompheniramine-Pseudoeph 4-60 MG CAPS, Take 1 tablet by mouth 2 times daily as needed (cough)  fluticasone-salmeterol (ADVAIR HFA) 115-21 MCG/ACT inhaler, Inhale 2 puffs into the lungs 2 times daily  albuterol (ACCUNEB) 1.25 MG/3ML nebulizer solution, Inhale 3 mLs into the lungs every 6 hours as needed for Wheezing  cloNIDine (CATAPRES) 0.1 MG tablet, Take 0.1 mg by mouth 2 times daily as needed   oxyCODONE-acetaminophen (PERCOCET) 7.5-325 MG per tablet, Take 1 tablet by mouth every 4 hours as needed for Pain.  guaiFENesin (EXPECTORANT COUGH CONTROL PO), Take by mouth     REVIEW OF SYSTEMS:  Constitutional:  No fevers, chills, nausea, vomiting, + tiredness &  fatigue   Head:  No head injury, facial trauma   Eyes:  No acute visual changes, exudate, trauma   Ears:  No acute hearing loss, earaches   Nose: No nasal discharge, epistaxis   Neck: No new hoarseness, voice change, or new masses   Lungs:   No hemoptysis, pleurisy, + SOB   Heart:  +chest pressure with exertion, +palpitations, + sweating    Abdomen:   No new masses, no bright red blood per rectum   Extremities: No acute pain while ambulating, no new lesions   Skin: No new changes in skin color, no rashes or lesions   Neurologic: No new motor or sensory changes, + anxiety       PHYSICAL EXAM:  BP (!) 135/49   Pulse 60   Temp 97 °F (36.1 °C) (Temporal)   Resp 16   Ht 5' 2\" (1.575 m)   Wt 149 lb 6.4 oz (67.8 kg)   SpO2 93%   BMI 27.33 kg/m²   No intake/output data recorded.       PHYSICAL EXAMINATION:    Vital Signs: Please see the chart   JESUS:  Awake, alert, patient appears tired and fatigued   Head/Eyes:  Normocephalic, atraumatic, EOMI and PERRLA bilaterally   ENT: Moist mucous membranes, nasal passages clear   Neck: Supple, full range of motion, no carotid bruit, trachea midline   Respiratory:   Bilateral decreased air entry in both lung fields, mild B/L crackles, symmetric expansion of chest   Cardiovascular:  Regular rate and rhythm, S1+S2+0, no murmurs/rubs   Urology: No bilateral CVA tenderness, no suprapubic tenderness   Abdomen:   Soft, non-tender, bowel sounds +ve, no organomegaly   Muscle/Joints: Moves all, decreased range of motion, no muscle spasms   Extremities: No clubbing, no cyanosis, no calf tenderness, no edema   Pulses: 2+ bilaterally, symmetrical   Skin: Warm, dry, no pallor/cyanosis/jaundice, no rashes/lesions   Neurologic: Awake, alert, cranial nerves II-XII intact, no focal neurological deficits, sensory system intact   Psychiatric: + anxious affect, non-suicidal         LABORATORY DATA:    CBC:  Recent Labs     02/17/21 2024   WBC 8.7   HGB 13.0   HCT 42.0        BMP:  Recent Labs     02/17/21 2024      K 3.9      CO2 28   BUN 11   CREATININE 0.8   CALCIUM 8.8     Recent Labs     02/17/21 2024   AST 27   ALT 34*   BILITOT <0.2   ALKPHOS 81     Coag Panel: No results for input(s): INR, PROTIME, APTT in the last 72 hours. Cardiac Enzymes:   Recent Labs     02/17/21 2045 02/17/21  2247   TROPONINI <0.01 <0.01     ABGs:No results found for: PHART, PO2ART, KDK7GCZ  Urinalysis:  Lab Results   Component Value Date    NITRU Negative 12/21/2020    BLOODU Negative 12/21/2020    SPECGRAV 1.021 12/21/2020    GLUCOSEU Negative 12/21/2020     A1C: No results for input(s): LABA1C in the last 72 hours. ABG:No results for input(s): PHART, ZAT9EKL, PO2ART, RDL3SDJ, BEART, HGBAE, W9XSECDB, CARBOXHGBART in the last 72 hours. EKG:   Please see chart      IMAGING:  Xr Chest Portable    Result Date: 2/17/2021  1. No acute disease.  Signed by Dr Cathy Matson on 2/17/2021 8:12 PM        Assessment and Plan:    Principal Problem:    Chest pain with moderate risk for cardiac etiology  Active Problems:    Cognitive deficit as late effect of cerebrovascular accident (CVA)    Generalized anxiety disorder    Dyslipidemia  Resolved Problems:    * No resolved hospital problems. *       Admit patient to medical unit under observation status  Continuous cardiac tele-monitoring  Patient given Aspirin in the ER  Monitor cardiac enzymes ×3  Full 2-D echo with color-flow and doppler ordered reviewed from last month which showed EF 50%  Keep patient NPO after midnight  Cardiology consultation in am for evaluation, further treatment recommendations and work up  Advised patient to relax, calm down and take things 1 at a time  Patient given Xanax 0.25 mg p.o. every 8 hours as needed for anxiety      Repeat labs in a.m. Electrolyte replacement as per protocol. Patient will be monitored very closely on the floor. Further recommendations as per the hospital course. Patient's management will be taken [place] [facility] [place].      Patient  is on DVT prophylaxis  Current medications reviewed  Lab work reviewed  Radiology/Chest x-ray films reviewed  Discussed with the nurse and addressed all questions/concerns  Discussed with Patient and/or Family at the bedside in detail . .. they verbalize understanding and agree with the management plan. Mercie Gaucher, MD  2:52 AM 2/18/2021      DISCLAIMER: This note was created with electronic voice recognition which does have occasional errors. If you have any questions regarding the content within the note please do not hesitate to contact me. .. Thanks.

## 2021-02-18 NOTE — PLAN OF CARE
Problem: Cardiac:  Goal: Ability to maintain vital signs within normal range will improve  Description: Ability to maintain vital signs within normal range will improve  Outcome: Ongoing  Goal: Cardiovascular alteration will improve  Description: Cardiovascular alteration will improve  Outcome: Ongoing     Problem: Health Behavior:  Goal: Will modify at least one risk factor affecting health status  Description: Will modify at least one risk factor affecting health status  Outcome: Ongoing  Goal: Identification of resources available to assist in meeting health care needs will improve  Description: Identification of resources available to assist in meeting health care needs will improve  Outcome: Ongoing   Electronically signed by Ernesto Guerra RN on 2/18/2021 at 2:45 AM

## 2021-02-18 NOTE — ED PROVIDER NOTES
[facility] CARE  eMERGENCY dEPARTMENT eNCOUnter      Pt Name: Dorothy Valerio  MRN: [id number]  [place] 1975  Date of evaluation: 2/17/2021  Provider: Urszula Yu, [address]       Chief Complaint   Patient presents with    Chest Pain     BP bottoms out         HISTORY OF PRESENT ILLNESS   (Location/Symptom, Timing/Onset,Context/Setting, Quality, Duration, Modifying Factors, Severity)  Note limiting factors. Yecenia Gonsales a 54 y.o. female who presents to the emergency department for evaluation of chest pain. Pt tells me that she has had intermittent episodes of left sided chest pain that began yesterday while moping a floor. She relates that she has had episodes of low blood pressure over past week. She gives history of hypertension and relates that she has been taking blood pressure medications as prescribed. She tells me that blood pressure medications have not been adjusted or changed. She tells me that she had episode of syncope 5 days ago at home. She relates that she was not evaluated due to not being able to get out of her driveway. She gives history of chf and AVR several years ago. She relates that she is anticipating vascular surgery left leg having history of claudication per EMR review. She has history of cigarette smoking, hypertension and hyperlipidemia. HPI    Nursing Notes were reviewed. REVIEW OF SYSTEMS    (2-9 systems for level 4, 10 or more for level 5)     Review of Systems   Constitutional: Negative for fatigue. Cardiovascular: Positive for chest pain. Gastrointestinal: Negative for vomiting. All other systems reviewed and are negative. A complete review of systems was performed and is negative except as noted above in the HPI.        PAST MEDICAL HISTORY     Past Medical History:   Diagnosis Date    Allergic rhinitis     Cancer ([facility] [address]     cervical, lymphoma    Congenital heart disease     MRSA infection     Stroke [facility])     after heart surgery 2013 SURGICAL HISTORY       Past Surgical History:   Procedure Laterality Date    CARDIAC SURGERY      Ross procedure and surgery prior on valve    PARTIAL HYSTERECTOMY           CURRENT MEDICATIONS       Discharge Medication List as of 2/18/2021 10:46 PM      CONTINUE these medications which have NOT CHANGED    Details   aspirin 81 MG EC tablet Take 81 mg by mouth dailyHistorical Med      Potassium 99 MG TABS Take by mouthHistorical Med      losartan (COZAAR) 50 MG tablet Take 50 mg by mouth dailyHistorical Med      lactulose (CHRONULAC) 10 GM/15ML solution Take 15 mLs by mouth every evening, Disp-450 mL, R-0Normal      doxepin (SINEQUAN) 25 MG capsule Take 1 capsule by mouth nightly, Disp-30 capsule, R-0Normal      furosemide (LASIX) 20 MG tablet Take 1 tablet by mouth 2 times daily, Disp-60 tablet, R-0Normal      FLUoxetine (PROZAC) 20 MG capsule Take 1 capsule by mouth daily, Disp-30 capsule, R-0Normal      oxyCODONE-acetaminophen (PERCOCET)  MG per tablet Take 1 tablet by mouth every 8 hours as needed for Pain for up to 30 days. May fill 2/8/21, Disp-90 tablet, R-0Normal      pregabalin (LYRICA) 75 MG capsule Take 1 capsule by mouth 2 times daily for 90 days. , Disp-60 capsule, R-2Normal      albuterol sulfate HFA (VENTOLIN HFA) 108 (90 Base) MCG/ACT inhaler Inhale 2 puffs into the lungs 4 times daily as needed for Wheezing, Disp-1 Inhaler,R-5Normal      atorvastatin (LIPITOR) 40 MG tablet Take 40 mg by mouth dailyHistorical Med      atenolol (TENORMIN) 50 MG tablet Take 50 mg by mouth dailyHistorical Med      sennosides-docusate sodium (SENOKOT-S) 8.6-50 MG tablet Take 1 tablet by mouth dailyHistorical Med      vitamin D3 (CHOLECALCIFEROL) 10 MCG (400 UNIT) TABS tablet Take 400 Units by mouth dailyHistorical Med      vitamin B-12 (CYANOCOBALAMIN) 1000 MCG tablet Take 1,000 mcg by mouth dailyHistorical Med      melatonin 3 MG TABS tablet Take 3 mg by mouth dailyHistorical Med fluticasone-salmeterol (ADVAIR HFA) 115-21 MCG/ACT inhaler Inhale 2 puffs into the lungs 2 times daily, Disp-1 Inhaler,R-5Normal      albuterol (ACCUNEB) 1.25 MG/3ML nebulizer solution Inhale 3 mLs into the lungs every 6 hours as needed for Wheezing, Disp-360 mL,R-3Normal      cloNIDine (CATAPRES) 0.1 MG tablet Take 0.1 mg by mouth 2 times daily as needed Historical Med      guaiFENesin (EXPECTORANT COUGH CONTROL PO) Take by mouthHistorical Med             ALLERGIES     Ceclor [cefaclor], Lortab [hydrocodone-acetaminophen], Penicillins, and Sulfa antibiotics    FAMILY HISTORY       Family History   Problem Relation Age of Onset    Diabetes Mother     Heart Disease Mother     Cancer Father           SOCIAL HISTORY       Social History     Socioeconomic History    Marital status: Single     Spouse name: None    Number of children: None    Years of education: None    Highest education level: None   Occupational History    None   Social Needs    Financial resource strain: None    Food insecurity     Worry: None     Inability: None    Transportation needs     Medical: None     Non-medical: None   Tobacco Use    Smoking status: Current Every Day Smoker     Packs/day: 1.00     Types: Cigarettes     Start date: 1990    Smokeless tobacco: Never Used   Substance and Sexual Activity    Alcohol use: Never     Frequency: Never     Binge frequency: Never    Drug use: Never    Sexual activity: None   Lifestyle    Physical activity     Days per week: None     Minutes per session: None    Stress: None   Relationships    Social connections     Talks on phone: None     Gets together: None     Attends Zoroastrian service: None     Active member of club or organization: None     Attends meetings of clubs or organizations: None     Relationship status: None    Intimate partner violence     Fear of current or ex partner: None     Emotionally abused: None     Physically abused: None     Forced sexual activity: None regular rate and rhythm. SR hr 62b/min Normal [place] interval and normal P waves. Normal QRS interval. Normal QT interval. No obvious ST elevation or ST depression. RADIOLOGY:   Non-plain film images such as CT, Ultrasound andMRI are read by the radiologist. Plain radiographic images are visualized and preliminarily interpreted by the emergency physician with the below findings:        Interpretation per the Radiologist below, if available at the time of this note:    NM MYOCARDIAL SPECT REST EXERCISE OR RX   Final Result   Impression:   There is possible small apical infarct versus attenuation artifact   with no obvious ischemia, with a calculated ejection fraction of 68 %. Suggest: Clinical correlation advised   Signed by Dr Kiara Lane on 2/18/2021 5:39 PM      XR CHEST PORTABLE   Final Result   1. No acute disease. Signed by Dr Ariana Regan on 2/17/2021 8:12 PM            ED BEDSIDE ULTRASOUND:   Performed by ED Physician - none    LABS:  Labs Reviewed   BRAIN NATRIURETIC PEPTIDE - Abnormal; Notable for the following components:       Result Value    Pro- (*)     All other components within normal limits   CBC WITH AUTO DIFFERENTIAL - Abnormal; Notable for the following components:    RBC 3.93 (*)     .9 (*)     MCH 33.1 (*)     MCHC 31.0 (*)     All other components within normal limits   COMPREHENSIVE METABOLIC PANEL W/ REFLEX TO MG FOR LOW K - Abnormal; Notable for the following components:    ALT 34 (*)     All other components within normal limits   CBC - Abnormal; Notable for the following components:    RBC 3.66 (*)     .3 (*)     MCH 33.1 (*)     MCHC 32.0 (*)     All other components within normal limits   COVID-19, RAPID   TROPONIN   D-DIMER, QUANTITATIVE   HCG, SERUM, QUALITATIVE   TROPONIN   TROPONIN   MAGNESIUM   PHOSPHORUS   BASIC METABOLIC PANEL       All other labs were within normal range or not returned as of this dictation.     RE-ASSESSMENT           EMERGENCY DEPARTMENT COURSE and DIFFERENTIALDIAGNOSIS/MDM:   Vitals:    Vitals:    02/18/21 1045 02/18/21 1115 02/18/21 1515 02/18/21 1856   BP: 136/62 117/67 125/76 116/86   Pulse: 52 64 60 57   Resp:  16 16 18   Temp:  97.4 °F (36.3 °C) 96.9 °F (36.1 °C) 97.5 °F (36.4 °C)   TempSrc:  Temporal Temporal Temporal   SpO2:  92% 91% 90%   Weight:       Height:           MDM      CONSULTS:  None    PROCEDURES:  Unless otherwise notedbelow, none     Procedures    FINAL IMPRESSION     1. Chest pain, unspecified type    2.  Syncope, unspecified syncope type          DISPOSITION/PLAN   DISPOSITION Admitted 02/18/2021 12:01:41 AM      PATIENT REFERRED TO:  Refugio Courtney, [id number] [address] [address]  [phone]    On 2/25/2021  AT  10:00      DISCHARGE MEDICATIONS:       Discharge Medication List as of 2/18/2021 10:46 PM          (Pleasenote that portions of this note were completed with a voice recognition program.  Efforts were made to edit the dictations but occasionally words are mis-transcribed.)              Ro Billy, APRN  02/19/21 1134

## 2021-02-18 NOTE — PROGRESS NOTES
Ronna Reyna down to 38 then back up to 52. Pt was sleeping. No complaints.  Electronically signed by Nadir Gonzales RN on 2/18/2021 at 4:27 AM

## 2021-02-19 LAB
LV EF: 68 %
LVEF MODALITY: NORMAL
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
## 2021-02-20 DIAGNOSIS — G47.01 INSOMNIA DUE TO MEDICAL CONDITION: ICD-10-CM

## 2021-02-23 LAB
EKG P AXIS: -6 DEGREES
EKG P AXIS: 65 DEGREES
EKG P-R INTERVAL: 184 MS
EKG P-R INTERVAL: 220 MS
EKG Q-T INTERVAL: 462 MS
EKG Q-T INTERVAL: 462 MS
EKG QRS DURATION: 104 MS
EKG QRS DURATION: 96 MS
EKG QTC CALCULATION (BAZETT): 450 MS
EKG QTC CALCULATION (BAZETT): 465 MS
EKG T AXIS: -16 DEGREES
EKG T AXIS: 80 DEGREES

## 2021-02-23 RX ORDER — QUETIAPINE FUMARATE 50 MG/1
TABLET, FILM COATED ORAL
Qty: 180 TABLET | Refills: 0 | OUTPATIENT
Start: 2021-02-23

## 2021-02-25 VITALS
HEIGHT: 62 IN | OXYGEN SATURATION: 90 % | DIASTOLIC BLOOD PRESSURE: 86 MMHG | HEART RATE: 57 BPM | BODY MASS INDEX: 27.49 KG/M2 | SYSTOLIC BLOOD PRESSURE: 116 MMHG | WEIGHT: 149.4 LBS | TEMPERATURE: 97.5 F | RESPIRATION RATE: 18 BRPM

## 2021-03-01 DIAGNOSIS — M54.50 CHRONIC BILATERAL LOW BACK PAIN WITHOUT SCIATICA: Primary | ICD-10-CM

## 2021-03-01 DIAGNOSIS — G89.29 CHRONIC BILATERAL LOW BACK PAIN WITHOUT SCIATICA: Primary | ICD-10-CM

## 2021-03-02 ENCOUNTER — TELEPHONE (OUTPATIENT)
Dept: PAIN MANAGEMENT | Age: 46
End: 2021-03-02

## 2021-03-02 RX ORDER — OXYCODONE AND ACETAMINOPHEN 7.5; 325 MG/1; MG/1
1 TABLET ORAL EVERY 8 HOURS PRN
Qty: 90 TABLET | Refills: 0 | Status: SHIPPED | OUTPATIENT
Start: 2021-03-10 | End: 2021-04-09

## 2021-03-02 NOTE — TELEPHONE ENCOUNTER
Patient left message on third floor scheduling line, and was forwarded to nurse line. Patient has a refill coming up and is asking for her pain medication to be increased from three tablets a day to four. Patient has taken four times daily for several years and was decreased at her appointment. She states she is having problems controlling her pain. I have [address] regarding this.

## 2021-03-03 ENCOUNTER — TELEPHONE (OUTPATIENT)
Dept: PAIN MANAGEMENT | Age: 46
End: 2021-03-03

## 2021-03-08 DIAGNOSIS — K59.00 CONSTIPATION, UNSPECIFIED CONSTIPATION TYPE: ICD-10-CM

## 2021-03-08 DIAGNOSIS — G47.00 INSOMNIA, UNSPECIFIED TYPE: ICD-10-CM

## 2021-03-08 NOTE — TELEPHONE ENCOUNTER
Rito Vernon is wanting refills on the Promethazine DM cough syrup . She wants refills on it , but I dont think we can do that .

## 2021-03-09 RX ORDER — LACTULOSE 10 G/15ML
10 SOLUTION ORAL EVERY EVENING
Qty: 450 ML | Refills: 5 | Status: SHIPPED | OUTPATIENT
Start: 2021-03-09 | End: 2021-03-10 | Stop reason: SDUPTHER

## 2021-03-09 RX ORDER — DOXEPIN HYDROCHLORIDE 25 MG/1
25 CAPSULE ORAL NIGHTLY
Qty: 30 CAPSULE | Refills: 5 | Status: SHIPPED | OUTPATIENT
Start: 2021-03-09 | End: 2021-03-10 | Stop reason: SDUPTHER

## 2021-03-10 ENCOUNTER — TELEPHONE (OUTPATIENT)
Dept: FAMILY MEDICINE CLINIC | Age: 46
End: 2021-03-10

## 2021-03-10 DIAGNOSIS — G47.00 INSOMNIA, UNSPECIFIED TYPE: ICD-10-CM

## 2021-03-10 DIAGNOSIS — F33.1 MODERATE EPISODE OF RECURRENT MAJOR DEPRESSIVE DISORDER (HCC): ICD-10-CM

## 2021-03-10 DIAGNOSIS — K59.00 CONSTIPATION, UNSPECIFIED CONSTIPATION TYPE: ICD-10-CM

## 2021-03-10 RX ORDER — LACTULOSE 10 G/15ML
10 SOLUTION ORAL EVERY EVENING
Qty: 450 ML | Refills: 5 | Status: SHIPPED | OUTPATIENT
Start: 2021-03-10 | End: 2021-03-14 | Stop reason: SDUPTHER

## 2021-03-10 RX ORDER — DEXTROMETHORPHAN HYDROBROMIDE AND PROMETHAZINE HYDROCHLORIDE 15; 6.25 MG/5ML; MG/5ML
5 SYRUP ORAL 4 TIMES DAILY PRN
Qty: 150 ML | Refills: 0 | Status: SHIPPED | OUTPATIENT
Start: 2021-03-10 | End: 2021-03-17

## 2021-03-10 RX ORDER — FLUOXETINE HYDROCHLORIDE 20 MG/1
20 CAPSULE ORAL DAILY
Qty: 30 CAPSULE | Refills: 0 | Status: SHIPPED | OUTPATIENT
Start: 2021-03-10 | End: 2021-03-14 | Stop reason: SDUPTHER

## 2021-03-10 RX ORDER — DOXEPIN HYDROCHLORIDE 25 MG/1
25 CAPSULE ORAL NIGHTLY
Qty: 30 CAPSULE | Refills: 5 | Status: SHIPPED | OUTPATIENT
Start: 2021-03-10 | End: 2021-03-14 | Stop reason: SDUPTHER

## 2021-03-10 NOTE — TELEPHONE ENCOUNTER
Carl Kim would like a script for Promethazine DM called to [facility] .   She is scheduled for an appointment for 03/16/21 10:30am .

## 2021-03-11 ENCOUNTER — TELEPHONE (OUTPATIENT)
Dept: PAIN MANAGEMENT | Age: 46
End: 2021-03-11

## 2021-03-11 NOTE — TELEPHONE ENCOUNTER
Received call from [address] [place] [address],[address] at [address] in [place]. They called to inform office that the pharmacist overheard the patient complaining about a decrease in medication to an individual present with the patient and that she would have to borrow some medications from the someone due to her script being decreased. Informed pharmacy that we would notify the provider and address the situation.

## 2021-03-11 NOTE — TELEPHONE ENCOUNTER
Received voicemail from patient requesting a return call related to her percocet prescription. This nurse returned call to patient. Patient did not understand why her Percocet prescription was decreased this month to 7.5 mg TID. Patient states this was not discussed with her at her appointment. Informed patient that Melissa's treatment plan states she was to increase Lyrica and decrease percocet. Informed patient that Bettyaba Blank is out of the office and will not return until next week and that she needs to take medications how they are prescribed. Informed patient that a message would be sent to Betty Blank regarding patients concerns about decreasing her pain medication. Patient reports she has been on Percocet 7.5mg QID for many years and that 7.5mg TID would not help with her pain.

## 2021-03-16 ENCOUNTER — OFFICE VISIT (OUTPATIENT)
Dept: FAMILY MEDICINE CLINIC | Age: 46
End: 2021-03-16
Payer: MEDICARE

## 2021-03-16 VITALS
WEIGHT: 151 LBS | RESPIRATION RATE: 20 BRPM | HEART RATE: 76 BPM | BODY MASS INDEX: 27.62 KG/M2 | TEMPERATURE: 97.3 F | SYSTOLIC BLOOD PRESSURE: 146 MMHG | DIASTOLIC BLOOD PRESSURE: 72 MMHG | OXYGEN SATURATION: 97 %

## 2021-03-16 DIAGNOSIS — I50.32 CHRONIC DIASTOLIC CONGESTIVE HEART FAILURE (HCC): ICD-10-CM

## 2021-03-16 DIAGNOSIS — F41.1 GENERALIZED ANXIETY DISORDER: Primary | ICD-10-CM

## 2021-03-16 PROCEDURE — G8484 FLU IMMUNIZE NO ADMIN: HCPCS | Performed by: NURSE PRACTITIONER

## 2021-03-16 PROCEDURE — 4004F PT TOBACCO SCREEN RCVD TLK: CPT | Performed by: NURSE PRACTITIONER

## 2021-03-16 PROCEDURE — G8419 CALC BMI OUT NRM PARAM NOF/U: HCPCS | Performed by: NURSE PRACTITIONER

## 2021-03-16 PROCEDURE — G8427 DOCREV CUR MEDS BY ELIG CLIN: HCPCS | Performed by: NURSE PRACTITIONER

## 2021-03-16 PROCEDURE — 99214 OFFICE O/P EST MOD 30 MIN: CPT | Performed by: NURSE PRACTITIONER

## 2021-03-16 RX ORDER — CHLORAL HYDRATE 500 MG
3000 CAPSULE ORAL 3 TIMES DAILY
COMMUNITY
End: 2021-06-03 | Stop reason: ALTCHOICE

## 2021-03-16 ASSESSMENT — PATIENT HEALTH QUESTIONNAIRE - PHQ9
SUM OF ALL RESPONSES TO PHQ QUESTIONS 1-9: 0
SUM OF ALL RESPONSES TO PHQ9 QUESTIONS 1 & 2: 0
1. LITTLE INTEREST OR PLEASURE IN DOING THINGS: 0
SUM OF ALL RESPONSES TO PHQ QUESTIONS 1-9: 0

## 2021-03-16 ASSESSMENT — ENCOUNTER SYMPTOMS
CONSTIPATION: 1
COUGH: 1
SHORTNESS OF BREATH: 1
ABDOMINAL PAIN: 0
TROUBLE SWALLOWING: 0
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
## 2021-03-20 PROBLEM — Z02.89 PAIN MEDICATION AGREEMENT: Status: ACTIVE | Noted: 2021-03-20

## 2021-03-20 PROBLEM — M79.2 NERVE PAIN: Status: ACTIVE | Noted: 2021-03-20

## 2021-03-22 RX ORDER — TRAZODONE HYDROCHLORIDE 100 MG/1
100 TABLET ORAL NIGHTLY PRN
Qty: 90 TABLET | Refills: 1 | Status: SHIPPED | OUTPATIENT
Start: 2021-03-22 | End: 2021-08-22

## 2021-03-29 ENCOUNTER — TELEPHONE (OUTPATIENT)
Dept: PAIN MANAGEMENT | Age: 46
End: 2021-03-29

## 2021-03-31 ENCOUNTER — TELEPHONE (OUTPATIENT)
Dept: PAIN MANAGEMENT | Age: 46
End: 2021-03-31

## 2021-04-06 ENCOUNTER — TELEPHONE (OUTPATIENT)
Dept: PAIN MANAGEMENT | Age: 46
End: 2021-04-06

## 2021-04-14 ENCOUNTER — TELEPHONE (OUTPATIENT)
Dept: PRIMARY CARE CLINIC | Age: 46
End: 2021-04-14

## 2021-04-22 ENCOUNTER — OFFICE VISIT (OUTPATIENT)
Dept: FAMILY MEDICINE CLINIC | Age: 46
End: 2021-04-22
Payer: MEDICARE

## 2021-04-22 VITALS
WEIGHT: 145 LBS | DIASTOLIC BLOOD PRESSURE: 86 MMHG | OXYGEN SATURATION: 98 % | BODY MASS INDEX: 26.52 KG/M2 | RESPIRATION RATE: 20 BRPM | TEMPERATURE: 96.7 F | SYSTOLIC BLOOD PRESSURE: 158 MMHG | HEART RATE: 75 BPM

## 2021-04-22 DIAGNOSIS — M54.50 CHRONIC BILATERAL LOW BACK PAIN WITHOUT SCIATICA: ICD-10-CM

## 2021-04-22 DIAGNOSIS — S06.9X6D: ICD-10-CM

## 2021-04-22 DIAGNOSIS — G89.29 CHRONIC BILATERAL LOW BACK PAIN WITHOUT SCIATICA: ICD-10-CM

## 2021-04-22 DIAGNOSIS — I10 ESSENTIAL HYPERTENSION: ICD-10-CM

## 2021-04-22 DIAGNOSIS — F41.9 ANXIETY: ICD-10-CM

## 2021-04-22 DIAGNOSIS — R41.0 CONFUSION: Primary | ICD-10-CM

## 2021-04-22 PROCEDURE — 4004F PT TOBACCO SCREEN RCVD TLK: CPT | Performed by: NURSE PRACTITIONER

## 2021-04-22 PROCEDURE — 99213 OFFICE O/P EST LOW 20 MIN: CPT | Performed by: NURSE PRACTITIONER

## 2021-04-22 PROCEDURE — G8419 CALC BMI OUT NRM PARAM NOF/U: HCPCS | Performed by: NURSE PRACTITIONER

## 2021-04-22 PROCEDURE — G8427 DOCREV CUR MEDS BY ELIG CLIN: HCPCS | Performed by: NURSE PRACTITIONER

## 2021-04-22 RX ORDER — BUSPIRONE HYDROCHLORIDE 5 MG/1
5 TABLET ORAL 3 TIMES DAILY
Qty: 90 TABLET | Refills: 0 | Status: SHIPPED | OUTPATIENT
Start: 2021-04-22 | End: 2021-05-25

## 2021-04-22 RX ORDER — TIZANIDINE 4 MG/1
4 TABLET ORAL 2 TIMES DAILY
Qty: 60 TABLET | Refills: 0 | Status: SHIPPED | OUTPATIENT
Start: 2021-04-22 | End: 2021-05-25

## 2021-04-22 RX ORDER — ACETAMINOPHEN 325 MG/1
650 TABLET ORAL EVERY 6 HOURS PRN
COMMUNITY
End: 2021-06-03 | Stop reason: ALTCHOICE

## 2021-04-22 RX ORDER — NABUMETONE 500 MG/1
500 TABLET, FILM COATED ORAL 2 TIMES DAILY
Qty: 60 TABLET | Refills: 0 | Status: SHIPPED | OUTPATIENT
Start: 2021-04-22 | End: 2021-06-03 | Stop reason: ALTCHOICE

## 2021-04-22 RX ORDER — LOSARTAN POTASSIUM 50 MG/1
25 TABLET ORAL DAILY
Qty: 30 TABLET | Refills: 0 | Status: SHIPPED | OUTPATIENT
Start: 2021-04-22 | End: 2021-08-30

## 2021-04-22 ASSESSMENT — ENCOUNTER SYMPTOMS: BACK PAIN: 1

## 2021-04-22 NOTE — PROGRESS NOTES
SUBJECTIVE:  She is here today to discuss her back pain and confusion  Patient ID: Sarah Dela Cruz is a 54 y.o. female. HPI:   HPI   Ms. Kait Polanco we have had a discussion prior to this with her and when she came in but she went back over her discussion with pain management she has been dismissed from them she states that she is unaware of what she actually did wrong she does have a follow-up appointment with her regular pain management which is up in [place] she says related to her brain injury which she was unconscious and had also a stroke that she has difficulty at times remembering how to do basic things like drive around fine places she is just now started back driving again she gets confused with how to operate certain things she says her phone had been shut down she said somebody got into it and mess with it but now is back up and running but she reiterated again about the pain management and being dismissed from there I have called pain management I was unable to speak to the nurse practitioner however I did speak to her nursing assistant and stated that they had tried multiple times to bring her in for a pill count and they are allowed what is called to to our window and patient did not come in within those that 2-hour window and then also had not return or called multiple times that they called so that she was dismissed at that time    She currently is having elevation of her blood pressure she relates it to being without her Percocet last 2 readings have been elevated there are some readings there that are normal like a high 105/60 she is requesting for me pain medication however I have explained to her that I am unable to write for chronic pain medicine and also unable to write for the medication that she is used to taking we did discuss maybe using a anti-inflammatory as well as a muscle relaxant until she can at that time get back into her pain management that is in [place]  Past Medical History: Diagnosis Date    Allergic rhinitis     Cancer ([facility] [address]     cervical, lymphoma    Congenital heart disease     MRSA infection     Stroke [facility])     after heart surgery 2013      Prior to Visit Medications    Medication Sig Taking? Authorizing Provider   acetaminophen (TYLENOL) 325 MG tablet Take 650 mg by mouth every 6 hours as needed for Pain Yes Historical Provider, MD   losartan (COZAAR) 50 MG tablet Take 0.5 tablets by mouth daily Yes MELIDA Lorenzana   nabumetone (RELAFEN) 500 MG tablet Take 1 tablet by mouth 2 times daily Yes MELIDA Lorenzana   tiZANidine (ZANAFLEX) 4 MG tablet Take 1 tablet by mouth 2 times daily Yes MELIDA Lorenzana   busPIRone (BUSPAR) 5 MG tablet Take 1 tablet by mouth 3 times daily Yes MELIDA Lorenzana   traZODone (DESYREL) 100 MG tablet Take 1 tablet by mouth nightly as needed for Sleep Yes MELIDA Lorenzana   albuterol sulfate HFA (VENTOLIN HFA) 108 (90 Base) MCG/ACT inhaler Inhale 2 puffs into the lungs 4 times daily as needed for Wheezing Yes MELIDA Lorenzana   furosemide (LASIX) 20 MG tablet Take 1 tablet by mouth 2 times daily Yes MELIDA Lorenzana   doxepin (SINEQUAN) 25 MG capsule Take 1 capsule by mouth nightly Yes MELIDA Lorenzana   FLUoxetine (PROZAC) 20 MG capsule Take 1 capsule by mouth daily Yes MELIDA Lorenzana   lactulose (CHRONULAC) 10 GM/15ML solution Take 15 mLs by mouth every evening Yes MELIDA Lorenzana   Cranberry 125 MG TABS Take by mouth Yes Historical Provider, MD   Omega-3 Fatty Acids (FISH OIL) 1000 MG CAPS Take 3,000 mg by mouth 3 times daily Yes Historical Provider, MD   aspirin 81 MG EC tablet Take 81 mg by mouth daily Yes Historical Provider, MD   Potassium 99 MG TABS Take by mouth Yes Historical Provider, MD   pregabalin (LYRICA) 75 MG capsule Take 1 capsule by mouth 2 times daily for 90 days.  Yes MELIDA Boucher - APARNA   fluticasone-salmeterol (ADVAIR HFA) 115-21 MCG/ACT inhaler Inhale 2 puffs into the lungs 2 times daily Yes MELIDA Figueroa   albuterol (ACCUNEB) 1.25 MG/3ML nebulizer solution Inhale 3 mLs into the lungs every 6 hours as needed for Wheezing Yes MELIDA Figueroa   atorvastatin (LIPITOR) 40 MG tablet Take 40 mg by mouth daily Yes Historical Provider, MD   sennosides-docusate sodium (SENOKOT-S) 8.6-50 MG tablet Take 1 tablet by mouth daily Yes Historical Provider, MD   vitamin D3 (CHOLECALCIFEROL) 10 MCG (400 UNIT) TABS tablet Take 400 Units by mouth daily Yes Historical Provider, MD   vitamin B-12 (CYANOCOBALAMIN) 1000 MCG tablet Take 1,000 mcg by mouth daily Yes Historical Provider, MD   melatonin 3 MG TABS tablet Take 3 mg by mouth daily Yes Historical Provider, MD      Allergies   Allergen Reactions    Ceclor [Cefaclor] Swelling    Lortab [Hydrocodone-Acetaminophen]     Penicillins Hives    Sulfa Antibiotics        Review of Systems   Constitutional: Negative for fatigue and fever. Musculoskeletal: Positive for arthralgias and back pain. Neurological: Negative for dizziness and headaches. Psychiatric/Behavioral: Positive for agitation and confusion. The patient is nervous/anxious. OBJECTIVE:    Physical Exam  Constitutional:       Appearance: Normal appearance. She is well-developed. She is not ill-appearing. HENT:      Head: Normocephalic and atraumatic. Right Ear: External ear normal.      Left Ear: External ear normal.      Nose: Nose normal.      Mouth/Throat:      Lips: Pink. Mouth: Mucous membranes are moist.   Eyes:      General: Lids are normal.      Extraocular Movements: Extraocular movements intact. Conjunctiva/sclera: Conjunctivae normal.   Neck:      Musculoskeletal: Normal range of motion. Cardiovascular:      Rate and Rhythm: Normal rate and regular rhythm. Heart sounds: Normal heart sounds. No murmur. Pulmonary:      Effort: Pulmonary effort is normal.      Breath sounds: Normal breath sounds.    Skin:     General: Skin is warm and dry. Neurological:      Mental Status: She is alert and oriented to person, place, and time. Motor: No weakness or tremor. Coordination: Coordination is intact. Psychiatric:         Attention and Perception: Attention and perception normal.         Mood and Affect: Mood normal.         Speech: Speech normal.         Behavior: Behavior normal. Behavior is cooperative. Thought Content: Thought content normal.         Cognition and Memory: Cognition and memory normal.         Judgment: Judgment normal.        BP (!) 158/86 (Site: Left Upper Arm, Position: Sitting, Cuff Size: Medium Adult)   Pulse 75   Temp 96.7 °F (35.9 °C) (Temporal)   Resp 20   Wt 145 lb (65.8 kg)   SpO2 98%   BMI 26.52 kg/m²      ASSESSMENT:      ICD-10-CM    1. Confusion  R41.0 External Referral To Psychiatry   2. Essential hypertension  I10 losartan (COZAAR) 50 MG tablet   3. Brain injury, with loss of consciousness greater than 24 hours without return to pre-existing conscious level with patient surviving, subsequent encounter  S06. 9X6D External Referral To Psychiatry   4. Chronic bilateral low back pain without sciatica  M54.5 nabumetone (RELAFEN) 500 MG tablet    G89.29 tiZANidine (ZANAFLEX) 4 MG tablet   5. Anxiety  F41.9 busPIRone (BUSPAR) 5 MG tablet       PLAN:    Nae Meyers: Check-Up (is wanting to get medication straightened out )  We are going to consult neuropsychiatry to help her with her brain injury it is a program that said to help them with work remembering things include using cues to help with day-to-day activities    Diagnosis and orders for this visit are above.

## 2021-04-28 ENCOUNTER — VIRTUAL VISIT (OUTPATIENT)
Dept: VASCULAR SURGERY | Age: 46
End: 2021-04-28
Payer: MEDICARE

## 2021-04-28 DIAGNOSIS — I70.213 ATHEROSCLER OF NATIVE ARTERY OF BOTH LEGS WITH INTERMIT CLAUDICATION (HCC): Primary | ICD-10-CM

## 2021-04-28 PROCEDURE — 99212 OFFICE O/P EST SF 10 MIN: CPT | Performed by: NURSE PRACTITIONER

## 2021-04-28 PROCEDURE — 4004F PT TOBACCO SCREEN RCVD TLK: CPT | Performed by: NURSE PRACTITIONER

## 2021-04-28 PROCEDURE — G8419 CALC BMI OUT NRM PARAM NOF/U: HCPCS | Performed by: NURSE PRACTITIONER

## 2021-04-28 PROCEDURE — G8427 DOCREV CUR MEDS BY ELIG CLIN: HCPCS | Performed by: NURSE PRACTITIONER

## 2021-04-28 NOTE — PROGRESS NOTES
Thiago Easton (:  1975) is a 54 y.o. female,Established patient, here for evaluation of the following chief complaint(s):  Follow-up            SUBJECTIVE/OBJECTIVE:  Lavelle Correa has a history of peripheral vascular disease of the lower extremities. She has had this for 1 - 5 years. Current treatment includes ASA EC daily. Lavelle Correa has not had new wounds. Recently, she reports claudication at a distance of  Which varies. Lavelle Correa reports that the right leg is equal to the left. She reports claudication is worsened and is mostly in the form of crampy type pain starting in the hips, thighs and calves. She has a short recovery time. This is reproducible in nature. She reports ischemic rest pain all the time but she thinks it is chronic pain. She reports walking with cart does not help. She has had a cancer surgery in the right leg. She has constant pain in that leg. She has been on percocet for 13 year and this was just stopped by pain management so she is \"hurting all the time\"  Both legs.         Thiago Easton is a 54 y.o. female with the following history as recorded in [facility]:  Patient Active Problem List    Diagnosis Date Noted    Nerve pain 2021    Pain medication agreement 2021    Chest pain with moderate risk for cardiac etiology 2021    Cognitive deficit as late effect of cerebrovascular accident (CVA) 2021    Generalized anxiety disorder 2021    Dyslipidemia 2021    Chronic bilateral low back pain without sciatica 2021     Current Outpatient Medications   Medication Sig Dispense Refill    acetaminophen (TYLENOL) 325 MG tablet Take 650 mg by mouth every 6 hours as needed for Pain      losartan (COZAAR) 50 MG tablet Take 0.5 tablets by mouth daily 30 tablet 0    nabumetone (RELAFEN) 500 MG tablet Take 1 tablet by mouth 2 times daily 60 tablet 0    tiZANidine (ZANAFLEX) 4 MG tablet Take 1 tablet by mouth 2 times daily 60 tablet 0    busPIRone (BUSPAR) 5 MG [facility])     after heart surgery 2013     Past Surgical History:   Procedure Laterality Date   Melinda Draft CARDIAC SURGERY      Ross procedure and surgery prior on valve    PARTIAL HYSTERECTOMY       Family History   Problem Relation Age of Onset    Diabetes Mother     Heart Disease Mother     Cancer Father      Social History     Tobacco Use    Smoking status: Current Every Day Smoker     Packs/day: 1.00     Types: Cigarettes     Start date: 1990    Smokeless tobacco: Never Used   Substance Use Topics    Alcohol use: Never     Frequency: Never     Binge frequency: Never       ROS  Eyes  no sudden vision change or amaurosis. Respiratory  no significant shortness of breath,  Cardiovascular  no chest pain or syncope. No  significant leg swelling. No claudication. Musculoskeletal  no gait disturbance  Skin  no new wound. Neurologic   No speech difficulty or lateralizing weakness. All other review of systems are negative. Physical Exam     Due to this being a TeleHealth encounter, evaluation of the following organ systems is limited: Vitals/Constitutional/EENT/Resp/CV/GI//MS/Neuro/Skin/Heme-Lymph-Imm. Constitutional  well developed, well nourished. No diaphoresis or acute distress. Neck- ROM appears normal  Extremities -No cyanosis, clubbing, no edema. No signs atheroembolic event. Pulmonary  effort appears normal.  No respiratory distress. No accessory muscle use  Neurologic  alert and oriented X 3. Cranial Nerves II-XII grossly intact  Skin  intact. No rash, erythema, or pallor. Psychiatric  mood, affect, and behavior appear normal.  Judgment and thought processes appear normal  Risk factors for atherosclerosis of all vascular beds have been reviewed with the patient including:  Family history, tobacco abuse in all forms, elevated cholesterol, hyperlipidemia, and diabetes. Reviewed previous studies including: jefry  Individual images were reviewed.   I agree with the findings  Results were discussed with the patient. ASSESSMENT/PLAN:  1. Atheroscler of native artery of both legs with intermit claudication ([facility] [address]    1. Atheroscler of native artery of both legs with intermit claudication ([facility] [address]         Discussed management of jefry which includes:  continue asa and lipitor to reduce risk of arterial thrombosis and to decrease rate of plaque buildup  Strongly encourage start/continue statin therapy -   Recommend no smoking - discussed the effect tobacco has on illness; She sees her \"old pain management doctor\" next 2 weeks    She will call us once she has the chronic pain situation addressed and then we can focus on her upcoming surgery      Patient instructed to walk as much as possible. Call our office with any progressive pain in leg(s) or hip(s) with walking. Take good care of your feet. Let us know right away if you develop a wound on your foot. Fabyb Ramesh is a 54 y.o. female being evaluated by a Virtual Visit (video visit) encounter to address concerns as mentioned above. A caregiver was present when appropriate. Due to this being a TeleHealth encounter (During QVDPP-07 public health emergency), evaluation of the following organ systems was limited: Vitals/Constitutional/EENT/Resp/CV/GI//MS/Neuro/Skin/Heme-Lymph-Imm. Pursuant to the emergency declaration under the [address] [place] [address] authority and the [employer] Act, this Virtual Visit was conducted with patient's (and/or legal guardian's) consent, to reduce the patient's risk of exposure to COVID-19 and provide necessary medical care. The patient (and/or legal guardian) has also been advised to contact this office for worsening conditions or problems, and seek emergency medical treatment and/or call 911 if deemed necessary.      Patient identification was verified at the start of the visit: Yes        Services were provided through a video synchronous discussion virtually to substitute for in-person clinic visit.  Patient and provider were located at their individual homes.     An electronic signature was used to authenticate this note.     --Jennett Ormond, APRN

## 2021-05-22 ENCOUNTER — PATIENT MESSAGE (OUTPATIENT)
Dept: VASCULAR SURGERY | Age: 46
End: 2021-05-22

## 2021-05-22 ENCOUNTER — PATIENT MESSAGE (OUTPATIENT)
Dept: ENT CLINIC | Age: 46
End: 2021-05-22

## 2021-05-24 ENCOUNTER — TELEPHONE (OUTPATIENT)
Dept: FAMILY MEDICINE CLINIC | Age: 46
End: 2021-05-24

## 2021-05-24 DIAGNOSIS — C85.93 LYMPHOMA OF INTRA-ABDOMINAL LYMPH NODES, UNSPECIFIED LYMPHOMA TYPE (HCC): ICD-10-CM

## 2021-05-24 DIAGNOSIS — M54.50 CHRONIC BILATERAL LOW BACK PAIN WITHOUT SCIATICA: Primary | ICD-10-CM

## 2021-05-24 DIAGNOSIS — G89.29 CHRONIC BILATERAL LOW BACK PAIN WITHOUT SCIATICA: Primary | ICD-10-CM

## 2021-05-24 NOTE — TELEPHONE ENCOUNTER
From: Nia Queen  To: Tito Davies PA-C  Sent: 5/22/2021 3:51 PM CDT  Subject: Non-Urgent Medical Question    I was NOT A NO SHOW!!!!!!!I was checked in an registered sent upstairs to b turned away that's NOT A NO SHOW!!!

## 2021-05-24 NOTE — TELEPHONE ENCOUNTER
From: Derik Roberson  To: DeeMELIDA Dejesus  Sent: 5/22/2021 3:49 PM CDT  Subject: Non-Urgent Medical Question    I did show I was checked in downstairs had meds in hand in front of nurse upon entering she said(after I was checked in an billed but she would not do asked for pill count only said they no longer see me this was not even scheduled appt was called an requested to come in for pill count I did show bfor office closed checked in downstairs proceeded to restroom then upstairs to pain doc office where I was told w meds in hand doc was gone very inappropriate to say I was no show when I was checked in an billed but would not do my count or drug screen when I asked y nurse said doc was gone nurse does pill count an drug screen not docs !! !!Walt Fernandez r very very wrong in the way I was misled an let down an lied on my record when I did show an did have meds in hand but Walt Fernandez did NOT do your part !!!

## 2021-06-03 ENCOUNTER — OFFICE VISIT (OUTPATIENT)
Dept: FAMILY MEDICINE CLINIC | Age: 46
End: 2021-06-03
Payer: MEDICARE

## 2021-06-03 VITALS
RESPIRATION RATE: 20 BRPM | HEIGHT: 62 IN | BODY MASS INDEX: 27.05 KG/M2 | TEMPERATURE: 97.2 F | SYSTOLIC BLOOD PRESSURE: 132 MMHG | WEIGHT: 147 LBS | DIASTOLIC BLOOD PRESSURE: 76 MMHG | HEART RATE: 75 BPM | OXYGEN SATURATION: 95 %

## 2021-06-03 DIAGNOSIS — G89.29 CHRONIC BILATERAL LOW BACK PAIN WITHOUT SCIATICA: ICD-10-CM

## 2021-06-03 DIAGNOSIS — F41.9 ANXIETY: ICD-10-CM

## 2021-06-03 DIAGNOSIS — G47.19 EXCESSIVE DAYTIME SLEEPINESS: ICD-10-CM

## 2021-06-03 DIAGNOSIS — Z72.0 TOBACCO ABUSE: ICD-10-CM

## 2021-06-03 DIAGNOSIS — F33.1 MODERATE EPISODE OF RECURRENT MAJOR DEPRESSIVE DISORDER (HCC): ICD-10-CM

## 2021-06-03 DIAGNOSIS — M54.50 CHRONIC BILATERAL LOW BACK PAIN WITHOUT SCIATICA: ICD-10-CM

## 2021-06-03 DIAGNOSIS — J44.9 STAGE 3 SEVERE CHRONIC OBSTRUCTIVE PULMONARY DISEASE BY GLOBAL INITIATIVE FOR CHRONIC OBSTRUCTIVE LUNG DISEASE CLASSIFICATION (HCC): Primary | ICD-10-CM

## 2021-06-03 PROCEDURE — 4004F PT TOBACCO SCREEN RCVD TLK: CPT | Performed by: NURSE PRACTITIONER

## 2021-06-03 PROCEDURE — G8926 SPIRO NO PERF OR DOC: HCPCS | Performed by: NURSE PRACTITIONER

## 2021-06-03 PROCEDURE — G8419 CALC BMI OUT NRM PARAM NOF/U: HCPCS | Performed by: NURSE PRACTITIONER

## 2021-06-03 PROCEDURE — 99214 OFFICE O/P EST MOD 30 MIN: CPT | Performed by: NURSE PRACTITIONER

## 2021-06-03 PROCEDURE — G8427 DOCREV CUR MEDS BY ELIG CLIN: HCPCS | Performed by: NURSE PRACTITIONER

## 2021-06-03 PROCEDURE — 3023F SPIROM DOC REV: CPT | Performed by: NURSE PRACTITIONER

## 2021-06-03 RX ORDER — BUSPIRONE HYDROCHLORIDE 10 MG/1
10 TABLET ORAL 3 TIMES DAILY
Qty: 90 TABLET | Refills: 5 | Status: SHIPPED | OUTPATIENT
Start: 2021-06-03 | End: 2021-12-21 | Stop reason: SDUPTHER

## 2021-06-03 RX ORDER — TIZANIDINE 4 MG/1
4 TABLET ORAL EVERY 8 HOURS PRN
Qty: 90 TABLET | Refills: 3 | Status: SHIPPED | OUTPATIENT
Start: 2021-06-03 | End: 2021-08-09 | Stop reason: SDUPTHER

## 2021-06-03 RX ORDER — LIDOCAINE 50 MG/G
1 PATCH TOPICAL DAILY
Qty: 60 PATCH | Refills: 0 | Status: SHIPPED | OUTPATIENT
Start: 2021-06-03 | End: 2021-08-05

## 2021-06-03 RX ORDER — NICOTINE 21 MG/24HR
1 PATCH, TRANSDERMAL 24 HOURS TRANSDERMAL EVERY 24 HOURS
Qty: 30 PATCH | Refills: 3 | Status: SHIPPED | OUTPATIENT
Start: 2021-06-03 | End: 2021-08-24

## 2021-06-03 RX ORDER — FLUOXETINE HYDROCHLORIDE 40 MG/1
40 CAPSULE ORAL DAILY
Qty: 30 CAPSULE | Refills: 5 | Status: SHIPPED | OUTPATIENT
Start: 2021-06-03 | End: 2021-09-30 | Stop reason: SDUPTHER

## 2021-06-03 SDOH — ECONOMIC STABILITY: FOOD INSECURITY: WITHIN THE PAST 12 MONTHS, THE FOOD YOU BOUGHT JUST DIDN'T LAST AND YOU DIDN'T HAVE MONEY TO GET MORE.: NEVER TRUE

## 2021-06-03 SDOH — ECONOMIC STABILITY: FOOD INSECURITY: WITHIN THE PAST 12 MONTHS, YOU WORRIED THAT YOUR FOOD WOULD RUN OUT BEFORE YOU GOT MONEY TO BUY MORE.: NEVER TRUE

## 2021-06-03 ASSESSMENT — ENCOUNTER SYMPTOMS
TROUBLE SWALLOWING: 0
ABDOMINAL PAIN: 0
BACK PAIN: 1
COUGH: 1
PHOTOPHOBIA: 0
SHORTNESS OF BREATH: 1
CONSTIPATION: 0

## 2021-06-03 ASSESSMENT — SOCIAL DETERMINANTS OF HEALTH (SDOH): HOW HARD IS IT FOR YOU TO PAY FOR THE VERY BASICS LIKE FOOD, HOUSING, MEDICAL CARE, AND HEATING?: NOT HARD AT ALL

## 2021-06-03 NOTE — PROGRESS NOTES
SUBJECTIVE:  Feels bad all the time   Patient ID: Eric Truong is a 54 y.o. female. HPI:   HPI   Severe COPD and diffusion capacity showed on the pulmonology report PFT.   However she does continue to smoke she says she is to half a pack I have encouraged her to stop smoking she says she has tried several times but has not been successful I asked her if she is ever tried the NicoDerm patch she denied that she had tried it so would that have a pack that she is smoking I would like to try to try the 14 mg patch and let her remain on it for about 4 months and then slowly decrease to the 7 she is currently willing to try this  Left leg decrease blood flow Moderate I have looked through some of the charting to come to get a feel for some of the office visits that she has had she does follow-up with her cardiologist that is in [place] [facility] the [facility] but the most recent from what I can tell is the vascular appointment that shows decreased blood flow in the left leg I believe she is stating that they are wanting to stent the leg and I told her that probably could stand it without putting her to sleep her back placing her on a ventilator due to her lung problem she complains of a lot of pain and not being able to control her pain she has been to pain management in [place] includes the [facility] and recently here in [facility] although we have recently referred her to another pain management place I have prescribed some lidocaine patches for her to try and use to see if that will help  Pain management referral in the mean time will try Lidocaine 5%   Depression: She states she is mostly depressed because of her pain level not being able to get up and move around and wondering if some of her pain is claudication that she is having down her leg and course the breathing also contributes to her ability to move about she does state that at one point in time she was using oxygen at night after her second heart surgery so were going to try a overnight pulse ox to Movements: Extraocular movements intact. Conjunctiva/sclera: Conjunctivae normal.      Right eye: Right conjunctiva is not injected. Left eye: Left conjunctiva is not injected. Pupils: Pupils are equal, round, and reactive to light. Neck:      Thyroid: No thyromegaly. Vascular: No carotid bruit or JVD. Cardiovascular:      Rate and Rhythm: Normal rate and regular rhythm. Pulses: Normal pulses. Carotid pulses are 2+ on the right side and 2+ on the left side. Radial pulses are 2+ on the right side and 2+ on the left side. Heart sounds: Normal heart sounds, S1 normal and S2 normal. No murmur heard. Pulmonary:      Effort: Pulmonary effort is normal.      Breath sounds: Decreased breath sounds present. Abdominal:      General: Bowel sounds are normal. There is no distension or abdominal bruit. Palpations: Abdomen is soft. There is no mass. Hernia: No hernia is present. Musculoskeletal:         General: Normal range of motion. Cervical back: Full passive range of motion without pain, normal range of motion and neck supple. Right lower leg: No edema. Left lower leg: No edema. Lymphadenopathy:      Cervical: No cervical adenopathy. Right cervical: No superficial cervical adenopathy. Left cervical: No superficial cervical adenopathy. Upper Body:      Right upper body: No supraclavicular adenopathy. Left upper body: No supraclavicular adenopathy. Skin:     General: Skin is warm and dry. Coloration: Skin is not pale. Findings: No lesion or rash. Nails: There is no clubbing. Neurological:      Mental Status: She is alert and oriented to person, place, and time. Motor: No weakness or tremor. Coordination: Coordination normal.      Deep Tendon Reflexes: Reflexes are normal and symmetric.    Psychiatric:         Attention and Perception: Attention normal.         Mood and Affect: Mood normal. Speech: Speech normal.         Behavior: Behavior normal. Behavior is cooperative. Thought Content: Thought content normal.         Cognition and Memory: Cognition and memory normal.         Judgment: Judgment normal.        /76 (Site: Left Upper Arm, Position: Sitting, Cuff Size: Medium Adult)   Pulse 75   Temp 97.2 °F (36.2 °C) (Temporal)   Resp 20   Ht 5' 2\" (1.575 m)   Wt 147 lb (66.7 kg)   SpO2 95%   BMI 26.89 kg/m²      ASSESSMENT:      ICD-10-CM    1. Stage 3 severe chronic obstructive pulmonary disease by Global Initiative for Chronic Obstructive Lung Disease classification (HCC)  J44.9 Pulse oximetry, overnight   2. Excessive daytime sleepiness  G47.19 Pulse oximetry, overnight   3. Chronic bilateral low back pain without sciatica  M54.5 lidocaine (LIDODERM) 5 %    G89.29 tiZANidine (ZANAFLEX) 4 MG tablet   4. Tobacco abuse  Z72.0 nicotine (NICODERM CQ) 14 MG/24HR   5. Moderate episode of recurrent major depressive disorder ([facility])  F33.1 FLUoxetine (PROZAC) 40 MG capsule   6. Anxiety  F41.9 busPIRone (BUSPAR) 10 MG tablet       PLAN:    Nae Meyers: Check-Up (feels bad all the time. shortness of breath. depression . pain all the time cant sleep . she is losing bob in Dr's . she feels bad and seems noone wants to give her straight answers on anything . no one wants to treat her. )  She is to contact vascular to see about the stent placement into her lower leg  Review overnight pulse ox  May need to have a PA on the Lidoderm patch  And to quit smoking    Diagnosis and orders for this visit are above.

## 2021-06-07 ENCOUNTER — TELEPHONE (OUTPATIENT)
Dept: FAMILY MEDICINE CLINIC | Age: 46
End: 2021-06-07

## 2021-06-07 NOTE — TELEPHONE ENCOUNTER
Pharmacist at [place] is questioning [place]ing being on Doxepin and Trazadone . Is this ok ?

## 2021-06-16 DIAGNOSIS — J44.9 STAGE 3 SEVERE CHRONIC OBSTRUCTIVE PULMONARY DISEASE BY GLOBAL INITIATIVE FOR CHRONIC OBSTRUCTIVE LUNG DISEASE CLASSIFICATION (HCC): ICD-10-CM

## 2021-06-16 DIAGNOSIS — G47.19 EXCESSIVE DAYTIME SLEEPINESS: ICD-10-CM

## 2021-06-17 ENCOUNTER — HOSPITAL ENCOUNTER (OUTPATIENT)
Dept: GENERAL RADIOLOGY | Age: 46
Discharge: HOME | End: 2021-06-17
Payer: MEDICARE

## 2021-06-17 DIAGNOSIS — M25.551 RIGHT HIP PAIN: ICD-10-CM

## 2021-06-17 DIAGNOSIS — M25.551 RIGHT HIP PAIN: Primary | ICD-10-CM

## 2021-06-17 DIAGNOSIS — M54.50 LOW BACK PAIN, UNSPECIFIED BACK PAIN LATERALITY, UNSPECIFIED CHRONICITY, UNSPECIFIED WHETHER SCIATICA PRESENT: ICD-10-CM

## 2021-06-17 PROCEDURE — 72110 X-RAY EXAM L-2 SPINE 4/>VWS: CPT

## 2021-06-17 PROCEDURE — 73502 X-RAY EXAM HIP UNI 2-3 VIEWS: CPT

## 2021-06-23 ENCOUNTER — OFFICE VISIT (OUTPATIENT)
Dept: VASCULAR SURGERY | Age: 46
End: 2021-06-23
Payer: MEDICARE

## 2021-06-23 ENCOUNTER — HOSPITAL ENCOUNTER (OUTPATIENT)
Dept: CT IMAGING | Age: 46
Discharge: HOME | End: 2021-06-23
Payer: MEDICARE

## 2021-06-23 VITALS
RESPIRATION RATE: 18 BRPM | WEIGHT: 145 LBS | HEIGHT: 62 IN | OXYGEN SATURATION: 95 % | DIASTOLIC BLOOD PRESSURE: 80 MMHG | HEART RATE: 61 BPM | BODY MASS INDEX: 26.68 KG/M2 | SYSTOLIC BLOOD PRESSURE: 140 MMHG | TEMPERATURE: 97.1 F

## 2021-06-23 DIAGNOSIS — I70.213 ATHEROSCLER OF NATIVE ARTERY OF BOTH LEGS WITH INTERMIT CLAUDICATION (HCC): Primary | ICD-10-CM

## 2021-06-23 DIAGNOSIS — I70.213 ATHEROSCLER OF NATIVE ARTERY OF BOTH LEGS WITH INTERMIT CLAUDICATION (HCC): ICD-10-CM

## 2021-06-23 LAB
GFR AFRICAN AMERICAN: >60
GFR NON-AFRICAN AMERICAN: >60
PERFORMED ON: NORMAL
POC CREATININE: 0.7 MG/DL (ref 0.3–1.3)
POC SAMPLE TYPE: NORMAL

## 2021-06-23 PROCEDURE — 75635 CT ANGIO ABDOMINAL ARTERIES: CPT

## 2021-06-23 PROCEDURE — 99213 OFFICE O/P EST LOW 20 MIN: CPT | Performed by: NURSE PRACTITIONER

## 2021-06-23 PROCEDURE — 6360000004 HC RX CONTRAST MEDICATION: Performed by: NURSE PRACTITIONER

## 2021-06-23 PROCEDURE — G8427 DOCREV CUR MEDS BY ELIG CLIN: HCPCS | Performed by: NURSE PRACTITIONER

## 2021-06-23 PROCEDURE — G8419 CALC BMI OUT NRM PARAM NOF/U: HCPCS | Performed by: NURSE PRACTITIONER

## 2021-06-23 PROCEDURE — 4004F PT TOBACCO SCREEN RCVD TLK: CPT | Performed by: NURSE PRACTITIONER

## 2021-06-23 PROCEDURE — 82565 ASSAY OF CREATININE: CPT

## 2021-06-23 RX ORDER — OXYCODONE AND ACETAMINOPHEN 7.5; 325 MG/1; MG/1
1 TABLET ORAL EVERY 4 HOURS PRN
COMMUNITY
End: 2021-12-14

## 2021-06-23 RX ADMIN — IOPAMIDOL 90 ML: 755 INJECTION, SOLUTION INTRAVENOUS at 11:48

## 2021-06-23 NOTE — PROGRESS NOTES
Anne Sommers (:  1975) is a 54 y.o. female,Established patient, here for evaluation of the following chief complaint(s):  Follow-up (Discuss Surgery)            SUBJECTIVE/OBJECTIVE:  James Benítez has a history of peripheral vascular disease of the lower extremities. She also has a hx of cancer and extensive debridement of the right groin with chronic pain involving the leg ever since. She has had a vein harvest from the left thigh for her heart. She has pain that varies considerably. Sometimes she has pain in the left hip with walking. Sometimes with sitting. Sometimes with standing. She is a poor historian. Anne Sommers is a 54 y.o. female with the following history as recorded in [facility]:  Patient Active Problem List    Diagnosis Date Noted    Nerve pain 2021    Pain medication agreement 2021    Chest pain with moderate risk for cardiac etiology 2021    Cognitive deficit as late effect of cerebrovascular accident (CVA) 2021    Generalized anxiety disorder 2021    Dyslipidemia 2021    Chronic bilateral low back pain without sciatica 2021     Current Outpatient Medications   Medication Sig Dispense Refill    oxyCODONE-acetaminophen (PERCOCET) 7.5-325 MG per tablet Take 1 tablet by mouth every 4 hours as needed for Pain.  lidocaine (LIDODERM) 5 % Place 1 patch onto the skin daily 12 hours on, 12 hours off.  60 patch 0    nicotine (NICODERM CQ) 14 MG/24HR Place 1 patch onto the skin every 24 hours 30 patch 3    FLUoxetine (PROZAC) 40 MG capsule Take 1 capsule by mouth daily 30 capsule 5    busPIRone (BUSPAR) 10 MG tablet Take 1 tablet by mouth 3 times daily 90 tablet 5    tiZANidine (ZANAFLEX) 4 MG tablet Take 1 tablet by mouth every 8 hours as needed (back pain) 90 tablet 3    losartan (COZAAR) 50 MG tablet Take 0.5 tablets by mouth daily 30 tablet 0    traZODone (DESYREL) 100 MG tablet Take 1 tablet by mouth nightly as needed for Sleep 90 Alcohol use: Never       ROS  Eyes  no sudden vision change or amaurosis. Respiratory  no significant shortness of breath,  Cardiovascular  no chest pain or syncope. No  significant leg swelling. No claudication. Musculoskeletal  no gait disturbance  Skin  no new wound. GI - has chronic long term issues with bloating and constipation; nothing new or different. No post prandial pain. Neurologic   No speech difficulty or lateralizing weakness. All other review of systems are negative. Physical Exam    BP (!) 140/80 (Site: Left Upper Arm)   Pulse 61   Temp 97.1 °F (36.2 °C) (Temporal)   Resp 18   Ht 5' 2\" (1.575 m)   Wt 145 lb (65.8 kg)   SpO2 95%   BMI 26.52 kg/m²     Examined with Dr. Herberth Hallman    Neck- carotid pulses 2+ to palpation with no bruit  Cardiovascular  Regular rate and rhythm. Pulmonary  effort appears normal.  No respiratory distress. Lungs - Breath sounds normal. No wheezes or rales. Extremities -  - Radial and brachial pulses are 2+ to palpation bilaterally. Right femoral pulse: absent; Right popliteal pulse: absent Right DP: absent; Right PT absent; Left femoral pulse: absent; Left popliteal pulse: absent; Left DP: absent; Left PT: absent No cyanosis, clubbing, or significant edema. No signs atheroembolic event. Extensive scarring right groin  Neurologic  alert and oriented X 3. Physiologic. Face symmetric. Skin  warm, dry, and intact. no wound  Psychiatric  mood, affect, and behavior appear normal.  Judgment and thought processes appear normal.    Risk factors for atherosclerosis of all vascular beds have been reviewed with the patient including:  Family history, tobacco abuse in all forms, elevated cholesterol, hyperlipidemia, and diabetes. Lower extremity arterial study: Right ROVERTO 1.07, Left ROVERTO 0.66. 1/21  Individual films reviewed: Yes. These results were reviewed with the patient.   Disease process is stable and chronic      Reviewed previous studies including: jefry  Individual images were reviewed. I agree with the findings  Results were discussed with the patient. ASSESSMENT/PLAN:  1. Atheroscler of native artery of both legs with intermit claudication ([address]  -     CTA ABDOMINAL AORTA W BILAT RUNOFF W CONTRAST; Future    1. Atheroscler of native artery of both legs with intermit claudication ([address]         Discussed management of jefry which includes:  continue asa and lipitor to reduce risk of arterial thrombosis and to decrease rate of plaque buildup  Strongly encourage start/continue statin therapy -   Recommend no smoking - discussed the effect tobacco has on illness; strongly encouraged cessation  Proceed with cta aorta and runoff -   Severe atheromatous changes of the abdominal aorta iliac   and femoral arteries. A long segment (approximately 1 cm) moderate narrowing (70%) of the   proximal superior mesenteric artery. There is mild poststenotic   dilatation. Subsequent normal branches. There are bilateral double renal arteries. No significant stenosis. There is complete occlusion of the left external iliac artery at the   level of origin. There is reconstitution of the distal left external   iliac artery from the inferior epigastric and circumflex iliac   arteries. There is subsequently crossing normal circulation in the   lower extremity bilaterally. The poor opacification of the distal tibia peroneal arteries are, I   believe, due to the timing of the injection and image acquisition. Other findings as above. Individual images were reviewed. Results were reviewed with the patient. Recommend start pletal - risks reviewed including black box warning of sudden cardiac death, palpitations, diarrhea, headache, and others    6 weeks follow up to see how she is doing with smoking cessation and pletal therapy  Continue asa/statin      Patient instructed to walk as much as possible.   Call our office with any progressive pain in leg(s) or hip(s) with walking. Take good care of your feet. Let us know right away if you develop a wound on your foot. An electronic signature was used to authenticate this note.     --David Frias, APRN

## 2021-06-24 RX ORDER — CILOSTAZOL 100 MG/1
100 TABLET ORAL 2 TIMES DAILY
Qty: 60 TABLET | Refills: 3 | Status: SHIPPED | OUTPATIENT
Start: 2021-06-24 | End: 2021-12-27 | Stop reason: SDUPTHER

## 2021-07-06 ENCOUNTER — PATIENT MESSAGE (OUTPATIENT)
Dept: VASCULAR SURGERY | Age: 46
End: 2021-07-06

## 2021-07-07 ENCOUNTER — TELEPHONE (OUTPATIENT)
Dept: FAMILY MEDICINE CLINIC | Age: 46
End: 2021-07-07

## 2021-07-07 NOTE — TELEPHONE ENCOUNTER
Pt called wanting to know if the calls she was getting from the pulse oximetry company was some kind of scam. I called her back but phone went straight to .  Left a message stating it was not a scam and that Abraham Stinson had ordered it on 6/16/2021

## 2021-08-03 DIAGNOSIS — M54.50 CHRONIC BILATERAL LOW BACK PAIN WITHOUT SCIATICA: ICD-10-CM

## 2021-08-03 DIAGNOSIS — G89.29 CHRONIC BILATERAL LOW BACK PAIN WITHOUT SCIATICA: ICD-10-CM

## 2021-08-04 ENCOUNTER — OFFICE VISIT (OUTPATIENT)
Dept: VASCULAR SURGERY | Age: 46
End: 2021-08-04
Payer: MEDICARE

## 2021-08-04 VITALS
BODY MASS INDEX: 26.43 KG/M2 | TEMPERATURE: 98.4 F | DIASTOLIC BLOOD PRESSURE: 64 MMHG | HEIGHT: 61 IN | OXYGEN SATURATION: 94 % | HEART RATE: 81 BPM | SYSTOLIC BLOOD PRESSURE: 112 MMHG | WEIGHT: 140 LBS

## 2021-08-04 DIAGNOSIS — I70.213 ATHEROSCLEROSIS OF NATIVE ARTERY OF BOTH LOWER EXTREMITIES WITH INTERMITTENT CLAUDICATION (HCC): ICD-10-CM

## 2021-08-04 PROCEDURE — G8427 DOCREV CUR MEDS BY ELIG CLIN: HCPCS | Performed by: NURSE PRACTITIONER

## 2021-08-04 PROCEDURE — G8419 CALC BMI OUT NRM PARAM NOF/U: HCPCS | Performed by: NURSE PRACTITIONER

## 2021-08-04 PROCEDURE — 4004F PT TOBACCO SCREEN RCVD TLK: CPT | Performed by: NURSE PRACTITIONER

## 2021-08-04 PROCEDURE — 99212 OFFICE O/P EST SF 10 MIN: CPT | Performed by: NURSE PRACTITIONER

## 2021-08-04 NOTE — PROGRESS NOTES
Terri Diallo (:  1975) is a 54 y.o. female,Established patient, here for evaluation of the following chief complaint(s):  Follow-up (assess pletal therapy )            SUBJECTIVE/OBJECTIVE:  Jamal Reyes has a history of peripheral vascular disease of the lower extremities. She has had this for 1 - 5 years. Current treatment includes ASA EC daily. Jamal Reyes has not had new wounds. Recently, she reports claudication at a distance of which varies. She has chronic pain in her right groin from previous cancer resection. She has pain when sitting, standing, or walking. She has been taking the pletal.  It is helping some but has occasional headache. She presents for follow-up of known SMA stenosis. She has had no previous intervention. She has no consistent post prandial pain. She does have constipation. Differential diagnosis considered for abdominal pain include but are not limited to mesenteric disease, diverticulitis, hiatal hernia, gallbladder disease, colitis, Crohn's and other's. Terri Diallo is a 54 y.o. female with the following history as recorded in [facility]:  Patient Active Problem List    Diagnosis Date Noted    Atherosclerosis of native artery of both lower extremities with intermittent claudication ([facility] 2021    Mesenteric artery stenosis (HCC)     Nerve pain 2021    Pain medication agreement 2021    Chest pain with moderate risk for cardiac etiology 2021    Cognitive deficit as late effect of cerebrovascular accident (CVA) 2021    Generalized anxiety disorder 2021    Dyslipidemia 2021    Chronic bilateral low back pain without sciatica 2021     Current Outpatient Medications   Medication Sig Dispense Refill    cilostazol (PLETAL) 100 MG tablet Take 1 tablet by mouth 2 times daily 60 tablet 3    oxyCODONE-acetaminophen (PERCOCET) 7.5-325 MG per tablet Take 1 tablet by mouth every 4 hours as needed for Pain.       nicotine (NICODERM CQ) 14 MG/24HR Place 1 patch onto the skin every 24 hours 30 patch 3    FLUoxetine (PROZAC) 40 MG capsule Take 1 capsule by mouth daily 30 capsule 5    busPIRone (BUSPAR) 10 MG tablet Take 1 tablet by mouth 3 times daily 90 tablet 5    tiZANidine (ZANAFLEX) 4 MG tablet Take 1 tablet by mouth every 8 hours as needed (back pain) 90 tablet 3    losartan (COZAAR) 50 MG tablet Take 0.5 tablets by mouth daily 30 tablet 0    traZODone (DESYREL) 100 MG tablet Take 1 tablet by mouth nightly as needed for Sleep 90 tablet 1    albuterol sulfate HFA (VENTOLIN HFA) 108 (90 Base) MCG/ACT inhaler Inhale 2 puffs into the lungs 4 times daily as needed for Wheezing 1 Inhaler 5    furosemide (LASIX) 20 MG tablet Take 1 tablet by mouth 2 times daily 60 tablet 5    doxepin (SINEQUAN) 25 MG capsule Take 1 capsule by mouth nightly 30 capsule 5    lactulose (CHRONULAC) 10 GM/15ML solution Take 15 mLs by mouth every evening 450 mL 5    Cranberry 125 MG TABS Take by mouth      aspirin 81 MG EC tablet Take 81 mg by mouth daily      fluticasone-salmeterol (ADVAIR HFA) 115-21 MCG/ACT inhaler Inhale 2 puffs into the lungs 2 times daily 1 Inhaler 5    albuterol (ACCUNEB) 1.25 MG/3ML nebulizer solution Inhale 3 mLs into the lungs every 6 hours as needed for Wheezing 360 mL 3    atorvastatin (LIPITOR) 40 MG tablet Take 40 mg by mouth daily      sennosides-docusate sodium (SENOKOT-S) 8.6-50 MG tablet Take 1 tablet by mouth daily      vitamin D3 (CHOLECALCIFEROL) 10 MCG (400 UNIT) TABS tablet Take 400 Units by mouth daily      vitamin B-12 (CYANOCOBALAMIN) 1000 MCG tablet Take 1,000 mcg by mouth daily       No current facility-administered medications for this visit.      Allergies: Ceclor [cefaclor], Lortab [hydrocodone-acetaminophen], Penicillins, Sulfa antibiotics, and Nabumetone  Past Medical History:   Diagnosis Date    Allergic rhinitis     Cancer [facility] [address]     cervical, lymphoma    Congenital heart disease     Mesenteric artery stenosis ([address]     MRSA infection     Stroke [facility])     after heart surgery 2013     Past Surgical History:   Procedure Laterality Date    CARDIAC SURGERY      Ross procedure and surgery prior on valve    PARTIAL HYSTERECTOMY       Family History   Problem Relation Age of Onset    Diabetes Mother     Heart Disease Mother     Cancer Father      Social History     Tobacco Use    Smoking status: Current Every Day Smoker     Packs/day: 1.00     Types: Cigarettes     Start date: 1990    Smokeless tobacco: Never Used   Substance Use Topics    Alcohol use: Never       ROS  Eyes  no sudden vision change or amaurosis. Respiratory  no significant shortness of breath,  Cardiovascular  no chest pain or syncope. No  significant leg swelling. No claudication. Musculoskeletal  no gait disturbance  Skin  no new wound. Neurologic   No speech difficulty or lateralizing weakness. All other review of systems are negative. Physical Exam    /64 (Site: Left Upper Arm, Position: Sitting, Cuff Size: Medium Adult)   Pulse 81   Temp 98.4 °F (36.9 °C) (Temporal)   Ht 5' 1\" (1.549 m)   Wt 140 lb (63.5 kg)   SpO2 94%   BMI 26.45 kg/m²       Neck- carotid pulses 2+ to palpation with no bruit  Cardiovascular  Regular rate and rhythm. Pulmonary  effort appears normal.  No respiratory distress. Lungs - Breath sounds normal. No wheezes or rales. Extremities -  - Radial and brachial pulses are 2+ to palpation bilaterally. Right femoral pulse: present 2+; Right popliteal pulse: absent Right DP: absent; Right PT absent; Left femoral pulse: absent; Left popliteal pulse: absent; Left DP: absent; Left PT: absent No cyanosis, clubbing, or significant edema. No signs atheroembolic event. Neurologic  alert and oriented X 3. Physiologic. Face symmetric. Skin  warm, dry, and intact.   no wound  Psychiatric  mood, affect, and behavior appear normal.  Judgment and thought processes appear normal.    Risk factors for atherosclerosis of all vascular beds have been reviewed with the patient including:  Family history, tobacco abuse in all forms, elevated cholesterol, hyperlipidemia, and diabetes. Discussed operative intervention or cmm    ASSESSMENT/PLAN:  1. Atherosclerosis of native artery of both lower extremities with intermittent claudication ([address]    1. Atherosclerosis of native artery of both lower extremities with intermittent claudication (HCC)     stable and chronic    Discussed management of jefry which includes:  continue asa and lipitor to reduce risk of arterial thrombosis and to decrease rate of plaque buildup  Strongly encourage start/continue statin therapy -   Recommend no smoking - discussed the effect tobacco has on illness;   Continue pletal  Proceed with 4 months with jefry  Patient instructed to call with any weight loss of over 10 pounds in 1 months. Call with onset of any post-prandial pain or pain in the abdomen associated with eating. Patient instructed to walk as much as possible. Call our office with any progressive pain in leg(s) or hip(s) with walking. Take good care of your feet. Let us know right away if you develop a wound on your foot. An electronic signature was used to authenticate this note.     --Carolee Haines, MELIDA

## 2021-08-05 RX ORDER — LIDOCAINE 50 MG/G
1 PATCH TOPICAL DAILY
Qty: 60 PATCH | Refills: 0 | Status: SHIPPED | OUTPATIENT
Start: 2021-08-05 | End: 2021-09-30 | Stop reason: SDUPTHER

## 2021-08-09 DIAGNOSIS — M54.50 CHRONIC BILATERAL LOW BACK PAIN WITHOUT SCIATICA: ICD-10-CM

## 2021-08-09 DIAGNOSIS — G89.29 CHRONIC BILATERAL LOW BACK PAIN WITHOUT SCIATICA: ICD-10-CM

## 2021-08-09 RX ORDER — TIZANIDINE 4 MG/1
4 TABLET ORAL EVERY 8 HOURS PRN
Qty: 90 TABLET | Refills: 3 | Status: SHIPPED | OUTPATIENT
Start: 2021-08-09 | End: 2021-09-09 | Stop reason: SDUPTHER

## 2021-08-10 ENCOUNTER — TELEPHONE (OUTPATIENT)
Dept: FAMILY MEDICINE CLINIC | Age: 46
End: 2021-08-10

## 2021-08-10 NOTE — TELEPHONE ENCOUNTER
Olamide Bolanos is C/O cough at night keeps her awake . Is wanting some Promethazine DM called into pharmacy .

## 2021-08-17 RX ORDER — DEXTROMETHORPHAN HYDROBROMIDE AND PROMETHAZINE HYDROCHLORIDE 15; 6.25 MG/5ML; MG/5ML
5 SYRUP ORAL 4 TIMES DAILY PRN
Qty: 140 ML | Refills: 0 | Status: SHIPPED | OUTPATIENT
Start: 2021-08-17 | End: 2021-08-24

## 2021-08-24 ENCOUNTER — OFFICE VISIT (OUTPATIENT)
Dept: FAMILY MEDICINE CLINIC | Age: 46
End: 2021-08-24
Payer: MEDICARE

## 2021-08-24 VITALS
HEART RATE: 82 BPM | BODY MASS INDEX: 26.83 KG/M2 | TEMPERATURE: 97.7 F | OXYGEN SATURATION: 94 % | HEIGHT: 62 IN | SYSTOLIC BLOOD PRESSURE: 110 MMHG | WEIGHT: 145.8 LBS | DIASTOLIC BLOOD PRESSURE: 60 MMHG

## 2021-08-24 DIAGNOSIS — G89.29 CHRONIC BILATERAL LOW BACK PAIN WITHOUT SCIATICA: Primary | ICD-10-CM

## 2021-08-24 DIAGNOSIS — M54.50 CHRONIC BILATERAL LOW BACK PAIN WITHOUT SCIATICA: Primary | ICD-10-CM

## 2021-08-24 PROCEDURE — G8419 CALC BMI OUT NRM PARAM NOF/U: HCPCS | Performed by: NURSE PRACTITIONER

## 2021-08-24 PROCEDURE — 99213 OFFICE O/P EST LOW 20 MIN: CPT | Performed by: NURSE PRACTITIONER

## 2021-08-24 PROCEDURE — G8427 DOCREV CUR MEDS BY ELIG CLIN: HCPCS | Performed by: NURSE PRACTITIONER

## 2021-08-24 PROCEDURE — 4004F PT TOBACCO SCREEN RCVD TLK: CPT | Performed by: NURSE PRACTITIONER

## 2021-08-24 ASSESSMENT — ENCOUNTER SYMPTOMS: BACK PAIN: 1
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
## 2021-08-28 DIAGNOSIS — I10 ESSENTIAL HYPERTENSION: ICD-10-CM

## 2021-08-30 RX ORDER — LOSARTAN POTASSIUM 50 MG/1
TABLET ORAL
Qty: 30 TABLET | Refills: 0 | Status: SHIPPED | OUTPATIENT
Start: 2021-08-30 | End: 2021-11-22

## 2021-09-03 ENCOUNTER — TELEPHONE (OUTPATIENT)
Dept: FAMILY MEDICINE CLINIC | Age: 46
End: 2021-09-03

## 2021-09-03 NOTE — TELEPHONE ENCOUNTER
----- Message from Obdulia Lawson sent at 9/3/2021  1:46 PM CDT -----  Subject: Message to Provider    QUESTIONS  Information for Provider? The patient is wanting to leave a message for   tip, She's trying to get into pain management and she's having a hard   time and they will not take her and wondering if you could refer her to   some one else.  ---------------------------------------------------------------------------  --------------  CALL BACK INFO  What is the best way for the office to contact you? OK to leave message on   voicemail  Preferred Call Back Phone Number? [phone]  ---------------------------------------------------------------------------  --------------  SCRIPT ANSWERS  Relationship to Patient?  Self

## 2021-09-09 DIAGNOSIS — M54.50 CHRONIC BILATERAL LOW BACK PAIN WITHOUT SCIATICA: ICD-10-CM

## 2021-09-09 DIAGNOSIS — G89.29 CHRONIC BILATERAL LOW BACK PAIN WITHOUT SCIATICA: ICD-10-CM

## 2021-09-09 RX ORDER — TIZANIDINE 4 MG/1
4 TABLET ORAL EVERY 8 HOURS PRN
Qty: 90 TABLET | Refills: 3 | Status: SHIPPED | OUTPATIENT
Start: 2021-09-09 | End: 2021-09-30 | Stop reason: ALTCHOICE

## 2021-09-14 ENCOUNTER — OFFICE VISIT (OUTPATIENT)
Dept: FAMILY MEDICINE CLINIC | Age: 46
End: 2021-09-14

## 2021-09-14 VITALS
TEMPERATURE: 99 F | DIASTOLIC BLOOD PRESSURE: 82 MMHG | HEART RATE: 89 BPM | OXYGEN SATURATION: 95 % | SYSTOLIC BLOOD PRESSURE: 128 MMHG | WEIGHT: 145 LBS | BODY MASS INDEX: 26.52 KG/M2

## 2021-09-14 DIAGNOSIS — R05.9 COUGH: Primary | ICD-10-CM

## 2021-09-14 PROCEDURE — 99999 PR OFFICE/OUTPT VISIT,PROCEDURE ONLY: CPT | Performed by: NURSE PRACTITIONER

## 2021-09-14 NOTE — PROGRESS NOTES
Patient presents today with shortness of breath. Wheezing and cough. Was out of town last Friday and started having cough . Stopped at a pharmacy and had rapid test done it was positive . She came today for a covid swab to confirm . She is wheezing and has shortness of breath. Coughing .

## 2021-09-15 ENCOUNTER — TELEPHONE (OUTPATIENT)
Dept: FAMILY MEDICINE CLINIC | Age: 46
End: 2021-09-15

## 2021-09-15 DIAGNOSIS — J21.9 ACUTE BRONCHIOLITIS DUE TO UNSPECIFIED ORGANISM: Primary | ICD-10-CM

## 2021-09-15 NOTE — TELEPHONE ENCOUNTER
Called Nae to tell her that Covid test was negative . She is coughing and wheezing would like a Zpak and cough syrup called in to [facility].

## 2021-09-16 RX ORDER — DEXTROMETHORPHAN HYDROBROMIDE AND PROMETHAZINE HYDROCHLORIDE 15; 6.25 MG/5ML; MG/5ML
5 SYRUP ORAL 4 TIMES DAILY PRN
Qty: 140 ML | Refills: 0 | Status: SHIPPED | OUTPATIENT
Start: 2021-09-16 | End: 2021-09-23

## 2021-09-16 RX ORDER — AZITHROMYCIN 250 MG/1
250 TABLET, FILM COATED ORAL SEE ADMIN INSTRUCTIONS
Qty: 6 TABLET | Refills: 0 | Status: SHIPPED | OUTPATIENT
Start: 2021-09-16 | End: 2021-09-21

## 2021-09-27 ENCOUNTER — TELEPHONE (OUTPATIENT)
Dept: FAMILY MEDICINE CLINIC | Age: 46
End: 2021-09-27

## 2021-09-27 NOTE — TELEPHONE ENCOUNTER
Patient stated to Tramaine that she couldn't do the home sleep study because it was too complicated to do herself and she wants to do a sleep study in the lab. Patient wants her oxygen back and needs to do another overnight pulse ox to see if she still qualifies, because her results are too old to qualify with insurance. Chelle Welsh needs new orders for in lab sleep study and overnight pulse ox and will need notes either in person office visit or virtual visit to discuss the needs for the orders.

## 2021-09-28 NOTE — TELEPHONE ENCOUNTER
Please schedule in office appointment as she is due for labs and follow up on chronic diseases not related to pain management

## 2021-09-30 ENCOUNTER — OFFICE VISIT (OUTPATIENT)
Dept: FAMILY MEDICINE CLINIC | Age: 46
End: 2021-09-30
Payer: MEDICARE

## 2021-09-30 VITALS
BODY MASS INDEX: 25.69 KG/M2 | HEART RATE: 77 BPM | SYSTOLIC BLOOD PRESSURE: 120 MMHG | HEIGHT: 63 IN | DIASTOLIC BLOOD PRESSURE: 86 MMHG | OXYGEN SATURATION: 97 % | TEMPERATURE: 98 F | WEIGHT: 145 LBS

## 2021-09-30 VITALS
HEART RATE: 77 BPM | WEIGHT: 145 LBS | BODY MASS INDEX: 25.69 KG/M2 | HEIGHT: 63 IN | OXYGEN SATURATION: 97 % | TEMPERATURE: 98 F | RESPIRATION RATE: 20 BRPM | DIASTOLIC BLOOD PRESSURE: 86 MMHG | SYSTOLIC BLOOD PRESSURE: 120 MMHG

## 2021-09-30 DIAGNOSIS — Z00.00 ROUTINE GENERAL MEDICAL EXAMINATION AT A HEALTH CARE FACILITY: ICD-10-CM

## 2021-09-30 DIAGNOSIS — Z23 NEEDS FLU SHOT: ICD-10-CM

## 2021-09-30 DIAGNOSIS — Z13.220 SCREENING FOR HYPERLIPIDEMIA: ICD-10-CM

## 2021-09-30 DIAGNOSIS — G89.29 CHRONIC BILATERAL LOW BACK PAIN WITHOUT SCIATICA: Primary | ICD-10-CM

## 2021-09-30 DIAGNOSIS — Z11.59 NEED FOR HEPATITIS C SCREENING TEST: ICD-10-CM

## 2021-09-30 DIAGNOSIS — Z11.4 SCREENING FOR HIV WITHOUT PRESENCE OF RISK FACTORS: ICD-10-CM

## 2021-09-30 DIAGNOSIS — C85.93 LYMPHOMA OF INTRA-ABDOMINAL LYMPH NODES, UNSPECIFIED LYMPHOMA TYPE (HCC): ICD-10-CM

## 2021-09-30 DIAGNOSIS — K55.1 MESENTERIC ARTERY STENOSIS (HCC): ICD-10-CM

## 2021-09-30 DIAGNOSIS — Z12.11 SCREENING FOR COLON CANCER: Primary | ICD-10-CM

## 2021-09-30 DIAGNOSIS — Z72.89 OTHER PROBLEMS RELATED TO LIFESTYLE: ICD-10-CM

## 2021-09-30 DIAGNOSIS — R05.9 COUGH: ICD-10-CM

## 2021-09-30 DIAGNOSIS — F33.1 MODERATE EPISODE OF RECURRENT MAJOR DEPRESSIVE DISORDER (HCC): ICD-10-CM

## 2021-09-30 DIAGNOSIS — M54.50 CHRONIC BILATERAL LOW BACK PAIN WITHOUT SCIATICA: Primary | ICD-10-CM

## 2021-09-30 DIAGNOSIS — Z13.31 POSITIVE DEPRESSION SCREENING: ICD-10-CM

## 2021-09-30 DIAGNOSIS — I10 ESSENTIAL HYPERTENSION: ICD-10-CM

## 2021-09-30 DIAGNOSIS — J21.9 ACUTE BRONCHIOLITIS DUE TO UNSPECIFIED ORGANISM: ICD-10-CM

## 2021-09-30 PROCEDURE — G8419 CALC BMI OUT NRM PARAM NOF/U: HCPCS | Performed by: NURSE PRACTITIONER

## 2021-09-30 PROCEDURE — 96372 THER/PROPH/DIAG INJ SC/IM: CPT | Performed by: NURSE PRACTITIONER

## 2021-09-30 PROCEDURE — G0438 PPPS, INITIAL VISIT: HCPCS | Performed by: NURSE PRACTITIONER

## 2021-09-30 PROCEDURE — G0008 ADMIN INFLUENZA VIRUS VAC: HCPCS | Performed by: NURSE PRACTITIONER

## 2021-09-30 PROCEDURE — 4004F PT TOBACCO SCREEN RCVD TLK: CPT | Performed by: NURSE PRACTITIONER

## 2021-09-30 PROCEDURE — G8427 DOCREV CUR MEDS BY ELIG CLIN: HCPCS | Performed by: NURSE PRACTITIONER

## 2021-09-30 PROCEDURE — 90674 CCIIV4 VAC NO PRSV 0.5 ML IM: CPT | Performed by: NURSE PRACTITIONER

## 2021-09-30 PROCEDURE — 99214 OFFICE O/P EST MOD 30 MIN: CPT | Performed by: NURSE PRACTITIONER

## 2021-09-30 PROCEDURE — G8431 POS CLIN DEPRES SCRN F/U DOC: HCPCS | Performed by: NURSE PRACTITIONER

## 2021-09-30 RX ORDER — DEXAMETHASONE SODIUM PHOSPHATE 10 MG/ML
4 INJECTION INTRAMUSCULAR; INTRAVENOUS ONCE
Status: COMPLETED | OUTPATIENT
Start: 2021-09-30 | End: 2021-09-30

## 2021-09-30 RX ORDER — LIDOCAINE 50 MG/G
1 PATCH TOPICAL DAILY
Qty: 60 PATCH | Refills: 5 | Status: SHIPPED | OUTPATIENT
Start: 2021-09-30 | End: 2022-09-15 | Stop reason: ALTCHOICE

## 2021-09-30 RX ORDER — DEXTROMETHORPHAN HYDROBROMIDE AND PROMETHAZINE HYDROCHLORIDE 15; 6.25 MG/5ML; MG/5ML
5 SYRUP ORAL 4 TIMES DAILY PRN
Qty: 140 ML | Refills: 1 | Status: SHIPPED | OUTPATIENT
Start: 2021-09-30 | End: 2021-10-07

## 2021-09-30 RX ORDER — METHOCARBAMOL 500 MG/1
500 TABLET, FILM COATED ORAL 4 TIMES DAILY
Qty: 90 TABLET | Refills: 2 | Status: SHIPPED | OUTPATIENT
Start: 2021-09-30 | End: 2021-10-23

## 2021-09-30 RX ORDER — FLUOXETINE HYDROCHLORIDE 40 MG/1
40 CAPSULE ORAL DAILY
Qty: 30 CAPSULE | Refills: 5 | Status: SHIPPED | OUTPATIENT
Start: 2021-09-30 | End: 2022-05-09

## 2021-09-30 RX ORDER — TRIAMCINOLONE ACETONIDE 40 MG/ML
40 INJECTION, SUSPENSION INTRA-ARTICULAR; INTRAMUSCULAR ONCE
Status: COMPLETED | OUTPATIENT
Start: 2021-09-30 | End: 2021-09-30

## 2021-09-30 RX ADMIN — DEXAMETHASONE SODIUM PHOSPHATE 4 MG: 10 INJECTION INTRAMUSCULAR; INTRAVENOUS at 09:52

## 2021-09-30 RX ADMIN — TRIAMCINOLONE ACETONIDE 40 MG: 40 INJECTION, SUSPENSION INTRA-ARTICULAR; INTRAMUSCULAR at 09:54

## 2021-09-30 ASSESSMENT — LIFESTYLE VARIABLES: HOW OFTEN DO YOU HAVE A DRINK CONTAINING ALCOHOL: 0

## 2021-09-30 ASSESSMENT — PATIENT HEALTH QUESTIONNAIRE - PHQ9
4. FEELING TIRED OR HAVING LITTLE ENERGY: 3
3. TROUBLE FALLING OR STAYING ASLEEP: 3
SUM OF ALL RESPONSES TO PHQ QUESTIONS 1-9: 21
1. LITTLE INTEREST OR PLEASURE IN DOING THINGS: 3
5. POOR APPETITE OR OVEREATING: 3
SUM OF ALL RESPONSES TO PHQ9 QUESTIONS 1 & 2: 6
7. TROUBLE CONCENTRATING ON THINGS, SUCH AS READING THE NEWSPAPER OR WATCHING TELEVISION: 1
8. MOVING OR SPEAKING SO SLOWLY THAT OTHER PEOPLE COULD HAVE NOTICED. OR THE OPPOSITE, BEING SO FIGETY OR RESTLESS THAT YOU HAVE BEEN MOVING AROUND A LOT MORE THAN USUAL: 3
SUM OF ALL RESPONSES TO PHQ QUESTIONS 1-9: 21
6. FEELING BAD ABOUT YOURSELF - OR THAT YOU ARE A FAILURE OR HAVE LET YOURSELF OR YOUR FAMILY DOWN: 1
SUM OF ALL RESPONSES TO PHQ QUESTIONS 1-9: 20
2. FEELING DOWN, DEPRESSED OR HOPELESS: 3
10. IF YOU CHECKED OFF ANY PROBLEMS, HOW DIFFICULT HAVE THESE PROBLEMS MADE IT FOR YOU TO DO YOUR WORK, TAKE CARE OF THINGS AT HOME, OR GET ALONG WITH OTHER PEOPLE: 3
9. THOUGHTS THAT YOU WOULD BE BETTER OFF DEAD, OR OF HURTING YOURSELF: 1

## 2021-09-30 ASSESSMENT — COLUMBIA-SUICIDE SEVERITY RATING SCALE - C-SSRS
1. WITHIN THE PAST MONTH, HAVE YOU WISHED YOU WERE DEAD OR WISHED YOU COULD GO TO SLEEP AND NOT WAKE UP?: NO
2. HAVE YOU ACTUALLY HAD ANY THOUGHTS OF KILLING YOURSELF?: NO
6. HAVE YOU EVER DONE ANYTHING, STARTED TO DO ANYTHING, OR PREPARED TO DO ANYTHING TO END YOUR LIFE?: NO

## 2021-09-30 ASSESSMENT — ENCOUNTER SYMPTOMS
WHEEZING: 1
DIARRHEA: 0
BACK PAIN: 1
PHOTOPHOBIA: 0
CONSTIPATION: 0
RHINORRHEA: 0
SHORTNESS OF BREATH: 1
COUGH: 1

## 2021-09-30 NOTE — PROGRESS NOTES
SUBJECTIVE:  Needing labs and med refills   Patient ID: Francy Tidwell is a 54 y.o. female. HPI:   HPI   Pain Management: Searching for consult will suggest contact in previous pain management at [facility] Dr. Espinoza Hernandez. Depression and Anxiety I suggested consulting but resistant.      She is coming in today for a AWV which we have done that part of it however she is more focused on her problem oriented  is very aggravated it seems at the medical community primarily pain management she was seen pain management up in [place] and has moved down here to be with her and she has been to to pain management so that her local 1 was identified that she had they called multiple times to try and get her to come in for a pill count and did not show up during that time the second 1 had an abnormal urine drug screen she is very concerned that people are black listing her we have discussed about other options about going out of this area did contact another facility and I think Dr. Eriberto Gaitan turned her down she says now that she is interested in a pain pump and I believe Dr. Espinoza Hernandez is the one who put in those pain pump so hopefully we will be able to get her in with that I know we are working I have asked her about going outside of this area either back to [place] who accepted her she stated that she did not think that they would take her back although I am not sure why they would if they did not have a problem with any of her office visits either that her [facility] was an option to check into her referral is open account  I think she still following up with cardiology and vascular for her mesenteric artery stenosis she does have some abdominal type discomfort and pain she continues to smoke she states she has no reason to stop smoking because if you do not feel good then you might as well just smoke she states that the Tide scented thing is not working but I offered to switch her to Robaxin to see if that would help a little bit better and I renew the lidocaine patch she is still wheezing I will call in her Advair for her she is also wanting some cough medicine which is promethazine DM she says she has plenty of medicines for her blood pressure  Plan is review overnight pulse ox ordered and place order for sleep study if positive   Past Medical History:   Diagnosis Date    Allergic rhinitis     Cancer ([facility] [address]     cervical, lymphoma    Congenital heart disease     Mesenteric artery stenosis ([facility] [address]     MRSA infection     Stroke [facility])     after heart surgery 2013      Prior to Visit Medications    Medication Sig Taking? Authorizing Provider   lidocaine (LIDODERM) 5 % Place 1 patch onto the skin daily 12 hours on, 12 hours off. Yes MELIDA Birmingham   promethazine-dextromethorphan (PROMETHAZINE-DM) 6.25-15 MG/5ML syrup Take 5 mLs by mouth 4 times daily as needed for Cough Yes MELIDA [place]   FLUoxetine (PROZAC) 40 MG capsule Take 1 capsule by mouth daily Yes MELIDA Birmingham   methocarbamol (ROBAXIN) 500 MG tablet Take 1 tablet by mouth 4 times daily for 23 days Yes MELIDA Birmingham   fluticasone-salmeterol (ADVAIR HFA) 115-21 MCG/ACT inhaler Inhale 2 puffs into the lungs 2 times daily Yes MELIDA Birmingham   losartan (COZAAR) 50 MG tablet Take 1/2 (one-half) tablet by mouth once daily Yes MELIDA [place]   traZODone (DESYREL) 100 MG tablet TAKE 1 TABLET BY MOUTH NIGHTLY AS NEEDED FOR SLEEP Yes MELIDA Birmingham   cilostazol (PLETAL) 100 MG tablet Take 1 tablet by mouth 2 times daily Yes MELIDA Underwood   oxyCODONE-acetaminophen (PERCOCET) 7.5-325 MG per tablet Take 1 tablet by mouth every 4 hours as needed for Pain.  Yes Historical Provider, MD   busPIRone (BUSPAR) 10 MG tablet Take 1 tablet by mouth 3 times daily Yes MELIDA Birmingham   albuterol sulfate HFA (VENTOLIN HFA) 108 (90 Base) MCG/ACT inhaler Inhale 2 puffs into the lungs 4 times daily as needed for Wheezing Yes MELIDA Anthony   furosemide (LASIX) 20 MG tablet Take 1 tablet by mouth 2 times daily Yes MELIDA Anthony   doxepin (SINEQUAN) 25 MG capsule Take 1 capsule by mouth nightly Yes MELIDA Anthony   lactulose (CHRONULAC) 10 GM/15ML solution Take 15 mLs by mouth every evening Yes MELIDA Anthony   Cranberry 125 MG TABS Take by mouth Yes Historical Provider, MD   aspirin 81 MG EC tablet Take 81 mg by mouth daily Yes Historical Provider, MD   albuterol (ACCUNEB) 1.25 MG/3ML nebulizer solution Inhale 3 mLs into the lungs every 6 hours as needed for Wheezing Yes MELIDA Anthony   atorvastatin (LIPITOR) 40 MG tablet Take 40 mg by mouth daily Yes Historical Provider, MD   sennosides-docusate sodium (SENOKOT-S) 8.6-50 MG tablet Take 1 tablet by mouth daily Yes Historical Provider, MD   vitamin D3 (CHOLECALCIFEROL) 10 MCG (400 UNIT) TABS tablet Take 400 Units by mouth daily Yes Historical Provider, MD   vitamin B-12 (CYANOCOBALAMIN) 1000 MCG tablet Take 1,000 mcg by mouth daily Yes Historical Provider, MD     Allergies   Allergen Reactions    Ceclor [Cefaclor] Swelling    Lortab [Hydrocodone-Acetaminophen]     Penicillins Hives    Sulfa Antibiotics     Nabumetone Nausea And Vomiting       Review of Systems   Constitutional: Negative for fatigue and fever. HENT: Negative for congestion and rhinorrhea. Eyes: Negative for photophobia and visual disturbance. Respiratory: Positive for cough, shortness of breath and wheezing. Cardiovascular: Negative for chest pain and leg swelling. Gastrointestinal: Negative for constipation and diarrhea. Genitourinary: Negative for difficulty urinating. Musculoskeletal: Positive for arthralgias and back pain. Neurological: Negative for dizziness and headaches. Psychiatric/Behavioral: Positive for agitation and dysphoric mood. The patient is nervous/anxious.         OBJECTIVE:    Physical Exam  Constitutional:       Appearance: Normal appearance. She is well-developed and well-groomed. HENT:      Head: Normocephalic and atraumatic. Right Ear: Tympanic membrane, ear canal and external ear normal. There is no impacted cerumen. Left Ear: Tympanic membrane, ear canal and external ear normal. There is no impacted cerumen. Nose: Nose normal.      Mouth/Throat:      Lips: Pink. Mouth: Mucous membranes are moist.      Dentition: Normal dentition. Pharynx: Oropharynx is clear. Uvula midline. Eyes:      General: Lids are normal.         Right eye: No discharge. Left eye: No discharge. Extraocular Movements: Extraocular movements intact. Conjunctiva/sclera: Conjunctivae normal.      Right eye: Right conjunctiva is not injected. Left eye: Left conjunctiva is not injected. Pupils: Pupils are equal, round, and reactive to light. Neck:      Thyroid: No thyromegaly. Vascular: No carotid bruit or JVD. Cardiovascular:      Rate and Rhythm: Normal rate and regular rhythm. Pulses: Normal pulses. Carotid pulses are 2+ on the right side and 2+ on the left side. Radial pulses are 2+ on the right side and 2+ on the left side. Heart sounds: Normal heart sounds, S1 normal and S2 normal. No murmur heard. Pulmonary:      Effort: Pulmonary effort is normal.      Breath sounds: Wheezing present. Abdominal:      General: Bowel sounds are normal. There is no distension or abdominal bruit. Palpations: Abdomen is soft. There is no mass. Hernia: No hernia is present. Musculoskeletal:         General: Normal range of motion. Cervical back: Full passive range of motion without pain, normal range of motion and neck supple. Right lower leg: No edema. Left lower leg: No edema. Lymphadenopathy:      Cervical: No cervical adenopathy. Right cervical: No superficial cervical adenopathy. Left cervical: No superficial cervical adenopathy.       Upper Body:      Right upper body: No supraclavicular adenopathy. Left upper body: No supraclavicular adenopathy. Skin:     General: Skin is warm and dry. Coloration: Skin is not pale. Findings: No lesion or rash. Nails: There is no clubbing. Neurological:      Mental Status: She is alert and oriented to person, place, and time. Motor: No weakness or tremor. Coordination: Coordination normal.      Deep Tendon Reflexes: Reflexes are normal and symmetric. Psychiatric:         Attention and Perception: Attention normal.         Mood and Affect: Mood normal. Affect is angry and tearful. Speech: Speech is rapid and pressured. Behavior: Behavior is agitated. Behavior is cooperative. Thought Content: Thought content normal.         Cognition and Memory: Cognition and memory normal.         Judgment: Judgment normal.        /86 (Site: Left Upper Arm, Position: Sitting, Cuff Size: Medium Adult)   Pulse 77   Temp 98 °F (36.7 °C) (Temporal)   Ht 5' 3\" (1.6 m)   Wt 145 lb (65.8 kg)   SpO2 97%   BMI 25.69 kg/m²      ASSESSMENT:      ICD-10-CM    1. Chronic bilateral low back pain without sciatica  M54.5 dexamethasone (DECADRON) injection 4 mg    G89.29 triamcinolone acetonide (KENALOG-40) injection 40 mg     lidocaine (LIDODERM) 5 %     External Referral To Neurosurgery   2. Lymphoma of intra-abdominal lymph nodes, unspecified lymphoma type ([facility] [address]  C85.93    3. Mesenteric artery stenosis (HCC)  K55.1 CBC     Comprehensive Metabolic Panel   4. Essential hypertension  I10 CBC     Comprehensive Metabolic Panel   5. Cough  R05 promethazine-dextromethorphan (PROMETHAZINE-DM) 6.25-15 MG/5ML syrup   6. Moderate episode of recurrent major depressive disorder (HCC)  F33.1 FLUoxetine (PROZAC) 40 MG capsule   7. Acute bronchiolitis due to unspecified organism  J21.9 fluticasone-salmeterol (ADVAIR HFA) 115-21 MCG/ACT inhaler   8.  Needs flu shot  Z23 INFLUENZA, MDCK QUADV, 2 YRS AND OLDER, IM, PF, PREFILL SYR OR SDV, 0.5ML (FLUCELVAX QUADV, PF)       PLAN:    Nae Meyers: Other (muscle relaxers are not helping. ) and Referral - General (needs referral for neuro surgery Dr Robert Galeano at [place] [address] )  consult dr. Paula Coffey     Diagnosis and orders for thisvisit are above. All patient questions answered. Pt voiced understanding. Reviewedhealth maintenance. Instructed to continue current medications, diet and exercise. Patient agreed with treatment plan. Follow up as directed.

## 2021-09-30 NOTE — PROGRESS NOTES
Medicare Annual Wellness Visit  Name: Sandi Augustin Date: 2021   MRN: [id number] Sex: Female   Age: 54 y.o. Ethnicity: Non- / Non    : 1975 Race:  /   White (non-)      Terri Diallo is here for Medicare AWV    Screenings for behavioral, psychosocial and functional/safety risks, and cognitive dysfunction are all negative except as indicated below. These results, as well as other patient data from the [address] form, are documented in Flowsheets linked to this Encounter. Allergies   Allergen Reactions    Ceclor [Cefaclor] Swelling    Lortab [Hydrocodone-Acetaminophen]     Penicillins Hives    Sulfa Antibiotics     Nabumetone Nausea And Vomiting         Prior to Visit Medications    Medication Sig Taking? Authorizing Provider   losartan (COZAAR) 50 MG tablet Take 1/2 (one-half) tablet by mouth once daily Yes MELIDA Pérez   traZODone (DESYREL) 100 MG tablet TAKE 1 TABLET BY MOUTH NIGHTLY AS NEEDED FOR SLEEP Yes MELIDA Pérez   cilostazol (PLETAL) 100 MG tablet Take 1 tablet by mouth 2 times daily Yes MELIDA Campbell   oxyCODONE-acetaminophen (PERCOCET) 7.5-325 MG per tablet Take 1 tablet by mouth every 4 hours as needed for Pain.  Yes Historical Provider, MD   busPIRone (BUSPAR) 10 MG tablet Take 1 tablet by mouth 3 times daily Yes MELIDA Pérez   albuterol sulfate HFA (VENTOLIN HFA) 108 (90 Base) MCG/ACT inhaler Inhale 2 puffs into the lungs 4 times daily as needed for Wheezing Yes MELIDA Pérez   furosemide (LASIX) 20 MG tablet Take 1 tablet by mouth 2 times daily Yes MELIDA Pérez   doxepin (SINEQUAN) 25 MG capsule Take 1 capsule by mouth nightly Yes MELIDA Pérez   lactulose (CHRONULAC) 10 GM/15ML solution Take 15 mLs by mouth every evening Yes MELIDA Pérez   Cranberry 125 MG TABS Take by mouth Yes Historical Provider, MD   aspirin 81 MG EC tablet Take 81 mg by mouth daily Yes Historical Provider, MD   albuterol (ACCUNEB) 1.25 MG/3ML nebulizer solution Inhale 3 mLs into the lungs every 6 hours as needed for Wheezing Yes MELIDA Childers   atorvastatin (LIPITOR) 40 MG tablet Take 40 mg by mouth daily Yes Historical Provider, MD   sennosides-docusate sodium (SENOKOT-S) 8.6-50 MG tablet Take 1 tablet by mouth daily Yes Historical Provider, MD   vitamin D3 (CHOLECALCIFEROL) 10 MCG (400 UNIT) TABS tablet Take 400 Units by mouth daily Yes Historical Provider, MD   vitamin B-12 (CYANOCOBALAMIN) 1000 MCG tablet Take 1,000 mcg by mouth daily Yes Historical Provider, MD   lidocaine (LIDODERM) 5 % Place 1 patch onto the skin daily 12 hours on, 12 hours off.  MELIDA Childers   promethazine-dextromethorphan (PROMETHAZINE-DM) 6.25-15 MG/5ML syrup Take 5 mLs by mouth 4 times daily as needed for Cough  MELIDA Childers   FLUoxetine (PROZAC) 40 MG capsule Take 1 capsule by mouth daily  MELIDA Childers   methocarbamol (ROBAXIN) 500 MG tablet Take 1 tablet by mouth 4 times daily for 23 days  MELIDA Childers   fluticasone-salmeterol (ADVAIR HFA) 115-21 MCG/ACT inhaler Inhale 2 puffs into the lungs 2 times daily  MELIDA Childers         Past Medical History:   Diagnosis Date    Allergic rhinitis     Cancer ([facility] [address]     cervical, lymphoma    Congenital heart disease     Mesenteric artery stenosis ([facility] [address]     MRSA infection     Stroke [facility])     after heart surgery 2013       Past Surgical History:   Procedure Laterality Date   [employer] CARDIAC SURGERY      Ross procedure and surgery prior on valve    PARTIAL HYSTERECTOMY           Family History   Problem Relation Age of Onset    Diabetes Mother     Heart Disease Mother     Cancer Father        CareTeam (Including outside providers/suppliers regularly involved in providing care):   Patient Care Team:  MELIDA Childers as PCP - General (Nurse Practitioner Family)  MELIDA Childers as PCP - [facility] Provider  Prakash Larios MD as Consulting Physician (Vascular Surgery)    Wt Readings from Last 3 Encounters:   09/30/21 145 lb (65.8 kg)   09/14/21 145 lb (65.8 kg)   08/24/21 145 lb 12.8 oz (66.1 kg)     Vitals:    09/30/21 0828   BP: 120/86   Site: Left Upper Arm   Position: Sitting   Cuff Size: Medium Adult   Pulse: 77   Resp: 20   Temp: 98 °F (36.7 °C)   SpO2: 97%   Weight: 145 lb (65.8 kg)   Height: 5' 3\" (1.6 m)     Body mass index is 25.69 kg/m². Based upon direct observation of the patient, evaluation of cognition reveals recent and remote memory intact.   AWV was completed today however in discussion with the patient on various issues she was crying seem to be angry she felt like that with her advanced care planning that patient has no choice she stated that she didn't want to put it on anybody and that the hospital would be making her decisions anyway she seems very angry we all seem to be keep going back to the fact that she has pain issues we have referred her to pain management however she has been dismissed from to places and my suggestion was to go back to her previous place which was up in [place] she stated she didn't think they would take her back if she if she left on good terms of not sure why they wouldn't   From what I can tell from the AWV when I have her I'm trying to talk to her about the wellness she goes back to not having a voice having certain things being done by tried to talk to her about wellness and vaccines in recommendations she is very resistant to all of it not sure that she will comply although some of the stuff that she did state that she would try such as the lab draws  General Appearance: alert and oriented to person, place and time, well developed and well- nourished, in no acute distress Tearful crying   Skin: warm and dry, no rash or erythema  Head: normocephalic and atraumatic  Eyes: pupils equal, round, and reactive to light, extraocular eye movements intact, conjunctivae normal  ENT: tympanic membrane, external ear and ear canal normal bilaterally, nose without deformity, nasal mucosa and turbinates normal without polyps  Neck: supple and non-tender without mass, no thyromegaly or thyroid nodules, no cervical lymphadenopathy  Pulmonary/Chest: clear to auscultation bilaterally- Bilateral wheezes, rales or rhonchi, normal air movement, no respiratory distress  Cardiovascular: normal rate, regular rhythm, normal S1 and S2, no murmurs, rubs, clicks, or gallops, distal pulses intact, no carotid bruits  Abdomen: soft, non-tender, non-distended, normal bowel sounds, no masses or organomegaly  Extremities: no cyanosis, clubbing or edema  Musculoskeletal: normal range of motion, no joint swelling, deformity or tenderness  Neurologic: reflexes normal and symmetric, no cranial nerve deficit, gait, coordination and speech normal    Patient's complete Health Risk Assessment and screening values have been reviewed and are found in Flowsheets. The following problems were reviewed today and where indicated follow up appointments were made and/or referrals ordered.     Positive Risk Factor Screenings with Interventions:     Fall Risk:  2 or more falls in past year?: (!) yes  Fall with injury in past year?: no  Fall Risk Interventions:    · she is resisitant to discussion      Depression:  PHQ-2 Score: 6  PHQ-9 Total Score: 21    Severity:1-4 = minimal depression, 5-9 = mild depression, 10-14 = moderate depression, 15-19 = moderately severe depression, 20-27 = severe depression  Depression Interventions:  · I have attempted to get her to talk to  however she is refusing to talk to psychiatry she is afraid that they will take that and use it against her we talked a little bit about adjustment of her medication or maybe trying something different as she is on 40 mg of Prozac she is also resistant to that idea as well     Substance History:  Social History     Tobacco History Smoking Status  Current Every Day Smoker Smoking Start Date  1/1/1990 Smoking Frequency  1 pack/day Smoking Tobacco Type  Cigarettes    Smokeless Tobacco Use  Never Used          Alcohol History     Alcohol Use Status  Never          Drug Use     Drug Use Status  Never          Sexual Activity     Sexually Active  Not Asked               Alcohol Screening:       A score of 8 or more is associated with harmful or hazardous drinking. A score of 13 or more in women, and 15 or more in men, is likely to indicate alcohol dependence. Substance Abuse Interventions:  · Tobacco abuse:  patient is not ready to work toward tobacco cessation at this time    [address] [facility] and ACP:  General  In general, how would you say your health is?: Fair  In the past 7 days, have you experienced any of the following?  New or Increased Pain, New or Increased Fatigue, Loneliness, Social Isolation, Stress or Anger?: (!) Social Isolation, New or Increased Pain, New or Increased Fatigue  Do you get the social and emotional support that you need?: Yes  Do you have a Living Will?: (!) No  Advance Directives     Power of  Living Will ACP-Advance Directive ACP-Power of     Not on File Not on File Not on File Not on File      General Health Risk Interventions:  · Pain issues: pain management referral ordered  · Inadequate social/emotional support: patient declines any further intervention for this issue  · No Living Will: Patient declines ACP discussion/assistance    Health Habits/Nutrition:  Health Habits/Nutrition  Do you exercise for at least 20 minutes 2-3 times per week?: Yes  Have you lost any weight without trying in the past 3 months?: No  Do you eat only one meal per day?: No  Have you seen the dentist within the past year?: (!) No  Body mass index: (!) 25.68  Health Habits/Nutrition Interventions:  · Inadequate physical activity:  I have encouraged her to get back into doing her yoga    Hearing/Vision:  No exam data present  Hearing/Vision  Do you or your family notice any trouble with your hearing that hasn't been managed with hearing aids?: (!) Yes (left ear)  Do you have difficulty driving, watching TV, or doing any of your daily activities because of your eyesight?: No  Have you had an eye exam within the past year?: (!) No  Hearing/Vision Interventions:  · No concerns    Safety:  Safety  Do you have working smoke detectors?: Yes  Have all throw rugs been removed or fastened?: (!) No  Do you have non-slip mats or surfaces in all bathtubs/showers?: Yes  Do all of your stairways have a railing or banister?: Yes  Are your doorways, halls and stairs free of clutter?: Yes  Do you always fasten your seatbelt when you are in a car?: Yes  Safety Interventions:  · Patient declines any further evaluation/treatment for this issue     Personalized Preventive Plan   Current Health Maintenance Status  Immunization History   Administered Date(s) Administered    COVID-19, Moderna, PF, 100mcg/0.5mL 03/08/2021, 04/05/2021    Influenza, MDCK Quadv, IM, PF (Flucelvax 2 yrs and older) 09/30/2021        Health Maintenance   Topic Date Due    Pneumococcal 0-64 years Vaccine (1 of 4 - PCV13) Never done    HIV screen  Never done    DTaP/Tdap/Td vaccine (1 - Tdap) Never done    Colon cancer screen colonoscopy  Never done    Annual Wellness Visit (AWV)  Never done    COVID-19 Vaccine (3 - Moderna risk 3-dose series) 05/03/2021    Lipid screen  09/30/2022    Potassium monitoring  09/30/2022    Creatinine monitoring  09/30/2022    Flu vaccine  Completed    Hepatitis C screen  Completed    Hepatitis A vaccine  Aged Out    Hepatitis B vaccine  Aged Out    Hib vaccine  Aged Out    Meningococcal (ACWY) vaccine  Aged Out     Recommendations for [employer] Due: see orders and patient instructions/AVS.  .   Recommended screening schedule for the next 5-10 years is provided to the patient in written form: see Patient Tenisha Hebert was seen today for medicare awv. Diagnoses and all orders for this visit:    Screening for colon cancer  -     Cologuard (For External Results Only); Future    Lymphoma of intra-abdominal lymph nodes, unspecified lymphoma type ([facility] [address]    Positive depression screening  -     Positive Screen for Clinical Depression with a Documented Follow-up Plan     Need for hepatitis C screening test  -     Hepatitis C Antibody; Future    Other problems related to lifestyle  -     Hepatitis C Antibody; Future    Screening for HIV without presence of risk factors  -     HIV Rapid 1&2; Future    Screening for hyperlipidemia  -     Lipid, Fasting; Future    Routine general medical examination at a health care facility                 Advance Care Planning   Advanced Care Planning: Discussed the patients choices for care and treatment in case of a health event that adversely affects decision-making abilities. Also discussed the patients long-term treatment options. Reviewed the process of designating a Health Care Surrogate as defined by the [facility]. Reviewed the [facility] Will Directive process and the kinds of life-sustaining treatments the patient would like to receive should they become terminally ill or permanently unconscious. Explained the state requirement to complete the forms in the presence of two eligible witnesses OR in the presence of a . Patient was asked to provide a copy of the signed forms to the practice office. Time spent (minutes): 15 minutes     Tobacco Cessation Counseling: Patient advised about behavior change, including information about personal health harms, usage of appropriate cessation measures and benefits of cessation.   Time spent (minutes): 5 minutes  Hepatitis C Screening: Patient's exposure considered high risk due to IV drug abuse (details: history

## 2021-10-11 ENCOUNTER — NURSE ONLY (OUTPATIENT)
Dept: FAMILY MEDICINE CLINIC | Age: 46
End: 2021-10-11
Payer: MEDICARE

## 2021-10-11 DIAGNOSIS — Z23 NEED FOR PNEUMOCOCCAL VACCINATION: Primary | ICD-10-CM

## 2021-10-11 PROCEDURE — 99999 PR OFFICE/OUTPT VISIT,PROCEDURE ONLY: CPT | Performed by: NURSE PRACTITIONER

## 2021-10-11 PROCEDURE — 90732 PPSV23 VACC 2 YRS+ SUBQ/IM: CPT | Performed by: NURSE PRACTITIONER

## 2021-10-11 PROCEDURE — G0009 ADMIN PNEUMOCOCCAL VACCINE: HCPCS | Performed by: NURSE PRACTITIONER

## 2021-10-11 NOTE — PROGRESS NOTES
After obtaining consent, and per orders of Lalo CourserMELIDA, injection of pneumo 23 given in Left arm by Marcial Angel MA, DARIANA. Patient instructed to remain in clinic for 20 minutes afterwards, and to report any adverse reaction to me immediately.

## 2021-10-19 ENCOUNTER — TELEPHONE (OUTPATIENT)
Dept: FAMILY MEDICINE CLINIC | Age: 46
End: 2021-10-19

## 2021-10-26 ENCOUNTER — TELEPHONE (OUTPATIENT)
Dept: FAMILY MEDICINE CLINIC | Age: 46
End: 2021-10-26

## 2021-10-26 NOTE — TELEPHONE ENCOUNTER
Agustin Castañeda called and states that Aerradhakori has the orders for the patient but that she is currently on vacation and she will complete the test when she comes home.

## 2021-11-17 ENCOUNTER — TELEPHONE (OUTPATIENT)
Dept: FAMILY MEDICINE CLINIC | Age: 46
End: 2021-11-17

## 2021-11-17 DIAGNOSIS — G89.29 CHRONIC BILATERAL LOW BACK PAIN WITHOUT SCIATICA: Primary | ICD-10-CM

## 2021-11-17 DIAGNOSIS — M54.50 CHRONIC BILATERAL LOW BACK PAIN WITHOUT SCIATICA: Primary | ICD-10-CM

## 2021-11-17 NOTE — TELEPHONE ENCOUNTER
Letha Hebert We need to order an MRI on Nae before Dr Shailesh Love will see her . Can you please put in order ?

## 2021-11-17 NOTE — TELEPHONE ENCOUNTER
----- Message from Julia Aydee sent at 11/17/2021  2:24 PM CST -----  Subject: Referral Request    QUESTIONS   Reason for referral request? Cecilia Wheatley states that the Neuro DrJeannette needs Dr. Radha Courtney to order an MRI for her before December before they will see her. She is able to go get this done any time, but would like to do so ASAP. The old one she has will not work. Has the physician seen you for this condition before? No   Preferred Specialist (if applicable)? Do you already have an appointment scheduled? Yes  Select Scheduled Date? 2021-12-20  Select Scheduled Physician? Jacque Bernheim   Additional Information for Provider?   ---------------------------------------------------------------------------  --------------  Stacey SHARIF  What is the best way for the office to contact you? OK to leave message on   voicemail  Preferred Call Back Phone Number?  [phone]

## 2021-11-18 RX ORDER — TIZANIDINE 4 MG/1
4 TABLET ORAL 3 TIMES DAILY PRN
Qty: 30 TABLET | Refills: 0 | Status: SHIPPED | OUTPATIENT
Start: 2021-11-18 | End: 2021-12-02 | Stop reason: SDUPTHER

## 2021-11-18 NOTE — TELEPHONE ENCOUNTER
She also needs an MRI of her right hip . That is what is causing a lot of her pain . Also Robaxin muscle relaxer too expensive . Can she have something cheaper.

## 2021-11-29 ENCOUNTER — TELEPHONE (OUTPATIENT)
Dept: FAMILY MEDICINE CLINIC | Age: 46
End: 2021-11-29

## 2021-11-29 ENCOUNTER — HOSPITAL ENCOUNTER (OUTPATIENT)
Dept: MRI IMAGING | Age: 46
Discharge: HOME | End: 2021-11-29
Payer: MEDICARE

## 2021-11-29 DIAGNOSIS — M54.50 CHRONIC BILATERAL LOW BACK PAIN WITHOUT SCIATICA: ICD-10-CM

## 2021-11-29 DIAGNOSIS — G89.29 CHRONIC BILATERAL LOW BACK PAIN WITHOUT SCIATICA: ICD-10-CM

## 2021-11-29 DIAGNOSIS — F40.240 CLAUSTROPHOBIA: Primary | ICD-10-CM

## 2021-11-29 PROCEDURE — 73721 MRI JNT OF LWR EXTRE W/O DYE: CPT

## 2021-11-29 PROCEDURE — 72148 MRI LUMBAR SPINE W/O DYE: CPT

## 2021-11-29 RX ORDER — DIAZEPAM 5 MG/1
TABLET ORAL
Qty: 2 TABLET | Refills: 0 | Status: SHIPPED | OUTPATIENT
Start: 2021-11-29 | End: 2021-11-30

## 2021-11-29 NOTE — TELEPHONE ENCOUNTER
Aliviacarmen Jimenez is a patient of Yesi Kuo. Kuldeep Amado is off today . Patient has 2 MRI's today at [facility] . She is requesting a pill to take to relax her so she can get through both MRI's . Can we get something called to [facility] Please?

## 2021-11-29 NOTE — TELEPHONE ENCOUNTER
----- Message from Yves Panda sent at 11/26/2021  4:47 PM CST -----  Subject: Message to Provider    QUESTIONS  Information for Provider? ANTONIO CALLED AND SHE IS HAVING 2 MRI'S DONE ON   MONDAY AND WOULD LIKE SOMETHNG TO CALM HER DOWN BECAUSE SHE DOES NOT LIKE   MRI'S HER PHARMACY IS [address] IN [place]. ---------------------------------------------------------------------------  --------------  James SHARIF  What is the best way for the office to contact you? OK to leave message on   voicemail  Preferred Call Back Phone Number? [phone]  ---------------------------------------------------------------------------  --------------  SCRIPT ANSWERS  Relationship to Patient?  Self

## 2021-12-02 ENCOUNTER — TELEPHONE (OUTPATIENT)
Dept: FAMILY MEDICINE CLINIC | Age: 46
End: 2021-12-02

## 2021-12-02 NOTE — TELEPHONE ENCOUNTER
Manjit Cortez states that the phenergan DM cough syrup helps her . She has a cough and this helps it . She has tried multiple OTC syrups . They dont help .

## 2021-12-13 DIAGNOSIS — M79.89 PAIN AND SWELLING OF LEFT LOWER EXTREMITY: Primary | ICD-10-CM

## 2021-12-13 DIAGNOSIS — M79.605 PAIN AND SWELLING OF LEFT LOWER EXTREMITY: Primary | ICD-10-CM

## 2021-12-14 ENCOUNTER — OFFICE VISIT (OUTPATIENT)
Dept: FAMILY MEDICINE CLINIC | Age: 46
End: 2021-12-14
Payer: MEDICARE

## 2021-12-14 ENCOUNTER — TELEPHONE (OUTPATIENT)
Dept: VASCULAR SURGERY | Age: 46
End: 2021-12-14

## 2021-12-14 VITALS
DIASTOLIC BLOOD PRESSURE: 76 MMHG | OXYGEN SATURATION: 97 % | TEMPERATURE: 96.6 F | HEART RATE: 78 BPM | SYSTOLIC BLOOD PRESSURE: 124 MMHG | BODY MASS INDEX: 25.69 KG/M2 | WEIGHT: 145 LBS

## 2021-12-14 DIAGNOSIS — M79.662 PAIN OF LEFT CALF: Primary | ICD-10-CM

## 2021-12-14 DIAGNOSIS — G47.00 INSOMNIA, UNSPECIFIED TYPE: ICD-10-CM

## 2021-12-14 PROCEDURE — G8427 DOCREV CUR MEDS BY ELIG CLIN: HCPCS | Performed by: NURSE PRACTITIONER

## 2021-12-14 PROCEDURE — 4004F PT TOBACCO SCREEN RCVD TLK: CPT | Performed by: NURSE PRACTITIONER

## 2021-12-14 PROCEDURE — 96372 THER/PROPH/DIAG INJ SC/IM: CPT | Performed by: NURSE PRACTITIONER

## 2021-12-14 PROCEDURE — G8419 CALC BMI OUT NRM PARAM NOF/U: HCPCS | Performed by: NURSE PRACTITIONER

## 2021-12-14 PROCEDURE — 99213 OFFICE O/P EST LOW 20 MIN: CPT | Performed by: NURSE PRACTITIONER

## 2021-12-14 PROCEDURE — G8482 FLU IMMUNIZE ORDER/ADMIN: HCPCS | Performed by: NURSE PRACTITIONER

## 2021-12-14 RX ORDER — KETOROLAC TROMETHAMINE 30 MG/ML
30 INJECTION, SOLUTION INTRAMUSCULAR; INTRAVENOUS ONCE
Status: COMPLETED | OUTPATIENT
Start: 2021-12-14 | End: 2021-12-14

## 2021-12-14 RX ORDER — DOXEPIN HYDROCHLORIDE 25 MG/1
25 CAPSULE ORAL NIGHTLY
Qty: 30 CAPSULE | Refills: 5 | Status: SHIPPED | OUTPATIENT
Start: 2021-12-14 | End: 2022-02-24

## 2021-12-14 RX ADMIN — KETOROLAC TROMETHAMINE 30 MG: 30 INJECTION, SOLUTION INTRAMUSCULAR; INTRAVENOUS at 14:57

## 2021-12-14 NOTE — TELEPHONE ENCOUNTER
Spoke with pt to let her know we have her on today for 1pm for scans. Pt showed up yesterday to get scans. Pt arrived at [address] lab stated her scans will be at 2 and she can go eat and come back pt was very mad she didn't  Want to have the scans done pt stated to michelle sandhu that she isnt waitng shes hungry and is in pain. Vilma Mack stated to her that she needed the scans done so we could see what was causing her to have the pain. She did not want to stay pt left at 12pm. Pt called an left a vm today 12/14/21 stating that she had to wait 4 hours yesterday.  (not accurate) pt stated today 12/14/21 that she doesn't want the scans and she is going to her PCP today at 2pm

## 2021-12-14 NOTE — PROGRESS NOTES
SUBJECTIVE:  Leg pain   Patient ID: Kera Lee is a 51 y.o. female. HPI:   HPI   Leg pain: is in the calf with sudden onset. Sunday night. Sitting   Difficulty walking and worse. Went to vascular yesterday   Asking for a pain shot. No injury no popping sensation in calf. Using a cane. States has aunt as    She is also wanting med refills for her insomnia  Past Medical History:   Diagnosis Date    Allergic rhinitis     Cancer ([facility] [address]     cervical, lymphoma    Congenital heart disease     Mesenteric artery stenosis ([facility] [address]     MRSA infection     Stroke [facility])     after heart surgery 2013      Prior to Visit Medications    Medication Sig Taking? Authorizing Provider   doxepin (SINEQUAN) 25 MG capsule Take 1 capsule by mouth nightly Yes MELIDA Coulter   tiZANidine (ZANAFLEX) 4 MG tablet Take 1 tablet by mouth 3 times daily as needed (hip and back pain) Yes MELIDA Coulter   furosemide (LASIX) 20 MG tablet Take 1 tablet by mouth 2 times daily Yes MELIDA Coulter   losartan (COZAAR) 50 MG tablet Take 1/2 (one-half) tablet by mouth once daily Yes MELIDA Coulter   lidocaine (LIDODERM) 5 % Place 1 patch onto the skin daily 12 hours on, 12 hours off.  Yes MELIDA Coulter   FLUoxetine (PROZAC) 40 MG capsule Take 1 capsule by mouth daily Yes MELIDA Coulter   traZODone (DESYREL) 100 MG tablet TAKE 1 TABLET BY MOUTH NIGHTLY AS NEEDED FOR SLEEP Yes MELIDA Coulter   cilostazol (PLETAL) 100 MG tablet Take 1 tablet by mouth 2 times daily Yes MELIDA Castro   busPIRone (BUSPAR) 10 MG tablet Take 1 tablet by mouth 3 times daily Yes MELIDA Coulter   albuterol sulfate HFA (VENTOLIN HFA) 108 (90 Base) MCG/ACT inhaler Inhale 2 puffs into the lungs 4 times daily as needed for Wheezing Yes MELIDA Coulter   Cranberry 125 MG TABS Take by mouth Yes Historical Provider, MD   aspirin 81 MG EC tablet Take 81 mg by mouth daily Yes Historical Provider, MD albuterol (ACCUNEB) 1.25 MG/3ML nebulizer solution Inhale 3 mLs into the lungs every 6 hours as needed for Wheezing Yes MELIDA Langston   atorvastatin (LIPITOR) 40 MG tablet Take 40 mg by mouth daily Yes Historical Provider, MD   vitamin D3 (CHOLECALCIFEROL) 10 MCG (400 UNIT) TABS tablet Take 400 Units by mouth daily Yes Historical Provider, MD   vitamin B-12 (CYANOCOBALAMIN) 1000 MCG tablet Take 1,000 mcg by mouth daily Yes Historical Provider, MD      Allergies   Allergen Reactions    Ceclor [Cefaclor] Swelling    Lortab [Hydrocodone-Acetaminophen]     Penicillins Hives    Sulfa Antibiotics     Nabumetone Nausea And Vomiting       Review of Systems   Constitutional: Negative for fever. Cardiovascular: Positive for leg swelling. Musculoskeletal: Positive for arthralgias, gait problem and myalgias. OBJECTIVE:    Physical Exam  Constitutional:       Appearance: Normal appearance. She is well-developed. She is not ill-appearing. HENT:      Head: Normocephalic and atraumatic. Right Ear: External ear normal.      Left Ear: External ear normal.      Nose: Nose normal.      Mouth/Throat:      Lips: Pink. Mouth: Mucous membranes are moist.   Eyes:      General: Lids are normal.      Extraocular Movements: Extraocular movements intact. Conjunctiva/sclera: Conjunctivae normal.   Cardiovascular:      Rate and Rhythm: Normal rate and regular rhythm. Heart sounds: Normal heart sounds. No murmur heard. Pulmonary:      Effort: Pulmonary effort is normal.      Breath sounds: Normal breath sounds. Musculoskeletal:      Cervical back: Normal range of motion. Legs:       Comments: Lower ankle there is not any swelling   Skin:     General: Skin is warm and dry. Neurological:      Mental Status: She is alert and oriented to person, place, and time. Motor: No weakness or tremor. Coordination: Coordination is intact.    Psychiatric:         Attention and Perception: Attention and perception normal.         Mood and Affect: Mood normal.         Speech: Speech normal.         Behavior: Behavior normal. Behavior is cooperative. Thought Content: Thought content normal.         Cognition and Memory: Cognition and memory normal.         Judgment: Judgment normal.        /76 (Site: Left Upper Arm, Position: Sitting, Cuff Size: Medium Adult)   Pulse 78   Temp 96.6 °F (35.9 °C) (Temporal)   Wt 145 lb (65.8 kg)   SpO2 97%   BMI 25.69 kg/m²      ASSESSMENT:      ICD-10-CM    1. Pain of left calf  M79.662 US DOPPLER VENOUS LEG LEFT     ketorolac (TORADOL) injection 30 mg   2. Insomnia, unspecified type  G47.00 doxepin (SINEQUAN) 25 MG capsule       PLAN:    Nae Meyers: Leg Pain (left leg pain, sunday night had horrible pain in back of left leg) and Insomnia (med refill)  review us     Diagnosis and orders for this visit are above.

## 2021-12-15 ENCOUNTER — TELEPHONE (OUTPATIENT)
Dept: FAMILY MEDICINE CLINIC | Age: 46
End: 2021-12-15

## 2021-12-15 NOTE — TELEPHONE ENCOUNTER
----- Message from Popeye Matamoros sent at 12/15/2021 11:04 AM CST -----  Subject: Message to Provider    QUESTIONS  Information for Provider? pt was seen yesterday and got her blood work   done today and the results came back fine but she is still having severe   pain and is wondering if the pcp can prescribe something for it. please   reach out to pt to advise.   ---------------------------------------------------------------------------  --------------  CALL BACK INFO  What is the best way for the office to contact you? OK to leave message on   voicemail  Preferred Call Back Phone Number? [phone]  ---------------------------------------------------------------------------  --------------  SCRIPT ANSWERS  Relationship to Patient?  Self

## 2021-12-16 DIAGNOSIS — M79.662 PAIN OF LEFT CALF: ICD-10-CM

## 2021-12-20 ENCOUNTER — OFFICE VISIT (OUTPATIENT)
Dept: NEUROSURGERY | Facility: CLINIC | Age: 46
End: 2021-12-20

## 2021-12-20 VITALS
BODY MASS INDEX: 26.5 KG/M2 | DIASTOLIC BLOOD PRESSURE: 90 MMHG | WEIGHT: 144 LBS | HEIGHT: 62 IN | SYSTOLIC BLOOD PRESSURE: 128 MMHG

## 2021-12-20 DIAGNOSIS — M54.50 CHRONIC LEFT-SIDED LOW BACK PAIN WITHOUT SCIATICA: Primary | ICD-10-CM

## 2021-12-20 DIAGNOSIS — E66.3 OVERWEIGHT WITH BODY MASS INDEX (BMI) OF 26 TO 26.9 IN ADULT: ICD-10-CM

## 2021-12-20 DIAGNOSIS — M50.30 DEGENERATION OF CERVICAL INTERVERTEBRAL DISC: ICD-10-CM

## 2021-12-20 DIAGNOSIS — G89.29 CHRONIC LEFT-SIDED LOW BACK PAIN WITHOUT SCIATICA: Primary | ICD-10-CM

## 2021-12-20 DIAGNOSIS — F17.200 SMOKER: ICD-10-CM

## 2021-12-20 PROBLEM — M54.9 CHRONIC LEFT-SIDED BACK PAIN: Status: ACTIVE | Noted: 2021-12-20

## 2021-12-20 PROCEDURE — 99213 OFFICE O/P EST LOW 20 MIN: CPT | Performed by: NURSE PRACTITIONER

## 2021-12-20 RX ORDER — TRAZODONE HYDROCHLORIDE 100 MG/1
1 TABLET ORAL NIGHTLY PRN
COMMUNITY
Start: 2021-08-22

## 2021-12-20 NOTE — PATIENT INSTRUCTIONS
"BMI for Adults  What is BMI?  Body mass index (BMI) is a number that is calculated from a person's weight and height. BMI can help estimate how much of a person's weight is composed of fat. BMI does not measure body fat directly. Rather, it is an alternative to procedures that directly measure body fat, which can be difficult and expensive.  BMI can help identify people who may be at higher risk for certain medical problems.  What are BMI measurements used for?  BMI is used as a screening tool to identify possible weight problems. It helps determine whether a person is obese, overweight, a healthy weight, or underweight.  BMI is useful for:  · Identifying a weight problem that may be related to a medical condition or may increase the risk for medical problems.  · Promoting changes, such as changes in diet and exercise, to help reach a healthy weight. BMI screening can be repeated to see if these changes are working.  How is BMI calculated?  BMI involves measuring your weight in relation to your height. Both height and weight are measured, and the BMI is calculated from those numbers. This can be done either in English ([place] or metric measurements. Note that charts and online BMI calculators are available to help you find your BMI quickly and easily without having to do these calculations yourself.  To calculate your BMI in English ([place] measurements:    1. Measure your weight in pounds (lb).  2. Multiply the number of pounds by 703.  ? For example, for a person who weighs 180 lb, multiply that number by 703, which equals 126,540.  3. Measure your height in inches. Then multiply that number by itself to get a measurement called \"inches squared.\"  ? For example, for a person who is 70 inches tall, the \"inches squared\" measurement is 70 inches x 70 inches, which equals 4,900 inches squared.  4. Divide the total from step 2 (number of lb x 703) by the total from step 3 (inches squared): 126,540 ÷ 4,900 = 25.8. This is " "your BMI.    To calculate your BMI in metric measurements:  1. Measure your weight in kilograms (kg).  2. Measure your height in meters (m). Then multiply that number by itself to get a measurement called \"meters squared.\"  ? For example, for a person who is 1.75 m tall, the \"meters squared\" measurement is 1.75 m x 1.75 m, which is equal to 3.1 meters squared.  3. Divide the number of kilograms (your weight) by the meters squared number. In this example: 70 ÷ 3.1 = 22.6. This is your BMI.  What do the results mean?  BMI charts are used to identify whether you are underweight, normal weight, overweight, or obese. The following guidelines will be used:  · Underweight: BMI less than 18.5.  · Normal weight: BMI between 18.5 and 24.9.  · Overweight: BMI between 25 and 29.9.  · Obese: BMI of 30 or above.  Keep these notes in mind:  · Weight includes both fat and muscle, so someone with a muscular build, such as an athlete, may have a BMI that is higher than 24.9. In cases like these, BMI is not an accurate measure of body fat.  · To determine if excess body fat is the cause of a BMI of 25 or higher, further assessments may need to be done by a health care provider.  · BMI is usually interpreted in the same way for men and women.  Where to find more information  For more information about BMI, including tools to quickly calculate your BMI, go to these websites:  · Centers for Disease Control and Prevention: www.cdc.gov  · American Heart Association: www.heart.org  · National Heart, Lung, and Blood [place]: www.nhlbi.nih.gov  Summary  · Body mass index (BMI) is a number that is calculated from a person's weight and height.  · BMI may help estimate how much of a person's weight is composed of fat. BMI can help identify those who may be at higher risk for certain medical problems.  · BMI can be measured using English measurements or metric measurements.  · BMI charts are used to identify whether you are underweight, normal " "weight, overweight, or obese.  This information is not intended to replace advice given to you by your health care provider. Make sure you discuss any questions you have with your health care provider.  Document Revised: 09/09/2020 Document Reviewed: 07/17/2020  [employer] Patient Education © 2021 [employer]      https://www.nhlbi.nih.gov/files/docs/public/heart/dash_brief.pdf\">   DASH Eating Plan  DASH stands for Dietary Approaches to Stop Hypertension. The DASH eating plan is a healthy eating plan that has been shown to:  · Reduce high blood pressure (hypertension).  · Reduce your risk for type 2 diabetes, heart disease, and stroke.  · Help with weight loss.  What are tips for following this plan?  Reading food labels  · Check food labels for the amount of salt (sodium) per serving. Choose foods with less than 5 percent of the Daily Value of sodium. Generally, foods with less than 300 milligrams (mg) of sodium per serving fit into this eating plan.  · To find whole grains, look for the word \"whole\" as the first word in the ingredient list.  Shopping  · Buy products labeled as \"low-sodium\" or \"no salt added.\"  · Buy fresh foods. Avoid canned foods and pre-made or frozen meals.  Cooking  · Avoid adding salt when cooking. Use salt-free seasonings or herbs instead of table salt or sea salt. Check with your health care provider or pharmacist before using salt substitutes.  · Do not chavez foods. Cook foods using healthy methods such as baking, boiling, grilling, roasting, and broiling instead.  · Cook with heart-healthy oils, such as olive, canola, avocado, soybean, or sunflower oil.  Meal planning    · Eat a balanced diet that includes:  ? 4 or more servings of fruits and 4 or more servings of vegetables each day. Try to fill one-half of your plate with fruits and vegetables.  ? 6-8 servings of whole grains each day.  ? Less than 6 oz (170 g) of lean meat, poultry, or fish each day. A 3-oz (85-g) serving of meat is " about the same size as a deck of cards. One egg equals 1 oz (28 g).  ? 2-3 servings of low-fat dairy each day. One serving is 1 cup (237 mL).  ? 1 serving of nuts, seeds, or beans 5 times each week.  ? 2-3 servings of heart-healthy fats. Healthy fats called omega-3 fatty acids are found in foods such as walnuts, flaxseeds, fortified milks, and eggs. These fats are also found in cold-water fish, such as sardines, salmon, and mackerel.  · Limit how much you eat of:  ? Canned or prepackaged foods.  ? Food that is high in trans fat, such as some fried foods.  ? Food that is high in saturated fat, such as fatty meat.  ? Desserts and other sweets, sugary drinks, and other foods with added sugar.  ? Full-fat dairy products.  · Do not salt foods before eating.  · Do not eat more than 4 egg yolks a week.  · Try to eat at least 2 vegetarian meals a week.  · Eat more home-cooked food and less restaurant, buffet, and fast food.    Lifestyle  · When eating at a restaurant, ask that your food be prepared with less salt or no salt, if possible.  · If you drink alcohol:  ? Limit how much you use to:  § 0-1 drink a day for women who are not pregnant.  § 0-2 drinks a day for men.  ? Be aware of how much alcohol is in your drink. In the [place] one drink equals one 12 oz bottle of beer (355 mL), one 5 oz glass of wine (148 mL), or one 1½ oz glass of hard liquor (44 mL).  General information  · Avoid eating more than 2,300 mg of salt a day. If you have hypertension, you may need to reduce your sodium intake to 1,500 mg a day.  · Work with your health care provider to maintain a healthy body weight or to lose weight. Ask what an ideal weight is for you.  · Get at least 30 minutes of exercise that causes your heart to beat faster (aerobic exercise) most days of the week. Activities may include walking, swimming, or biking.  · Work with your health care provider or dietitian to adjust your eating plan to your individual calorie needs.  What  foods should I eat?  Fruits  All fresh, dried, or frozen fruit. Canned fruit in natural juice (without added sugar).  Vegetables  Fresh or frozen vegetables (raw, steamed, roasted, or grilled). Low-sodium or reduced-sodium tomato and vegetable juice. Low-sodium or reduced-sodium tomato sauce and tomato paste. Low-sodium or reduced-sodium canned vegetables.  Grains  Whole-grain or whole-wheat bread. Whole-grain or whole-wheat pasta. Brown rice. Oatmeal. Quinoa. Bulgur. Whole-grain and low-sodium cereals. Sameera bread. Low-fat, low-sodium crackers. Whole-wheat flour tortillas.  Meats and other proteins  Skinless chicken or turkey. Ground chicken or turkey. Pork with fat trimmed off. Fish and seafood. Egg whites. Dried beans, peas, or lentils. Unsalted nuts, nut butters, and seeds. Unsalted canned beans. Lean cuts of beef with fat trimmed off. Low-sodium, lean precooked or cured meat, such as sausages or meat loaves.  Dairy  Low-fat (1%) or fat-free (skim) milk. Reduced-fat, low-fat, or fat-free cheeses. Nonfat, low-sodium ricotta or cottage cheese. Low-fat or nonfat yogurt. Low-fat, low-sodium cheese.  Fats and oils  Soft margarine without trans fats. Vegetable oil. Reduced-fat, low-fat, or light mayonnaise and salad dressings (reduced-sodium). Canola, safflower, olive, avocado, soybean, and sunflower oils. Avocado.  Seasonings and condiments  Herbs. Spices. Seasoning mixes without salt.  Other foods  Unsalted popcorn and pretzels. Fat-free sweets.  The items listed above may not be a complete list of foods and beverages you can eat. Contact a dietitian for more information.  What foods should I avoid?  Fruits  Canned fruit in a light or heavy syrup. Fried fruit. Fruit in cream or butter sauce.  Vegetables  Creamed or fried vegetables. Vegetables in a cheese sauce. Regular canned vegetables (not low-sodium or reduced-sodium). Regular canned tomato sauce and paste (not low-sodium or reduced-sodium). Regular tomato and  vegetable juice (not low-sodium or reduced-sodium). Pickles. Olives.  Grains  Baked goods made with fat, such as croissants, muffins, or some breads. Dry pasta or rice meal packs.  Meats and other proteins  Fatty cuts of meat. Ribs. Fried meat. Arnett. Bologna, salami, and other precooked or cured meats, such as sausages or meat loaves. Fat from the back of a pig (fatback). Bratwurst. Salted nuts and seeds. Canned beans with added salt. Canned or smoked fish. Whole eggs or egg yolks. Chicken or turkey with skin.  Dairy  Whole or 2% milk, cream, and half-and-half. Whole or full-fat cream cheese. Whole-fat or sweetened yogurt. Full-fat cheese. Nondairy creamers. Whipped toppings. Processed cheese and cheese spreads.  Fats and oils  Butter. Stick margarine. Lard. Shortening. Ghee. Arnett fat. Tropical oils, such as coconut, palm kernel, or palm oil.  Seasonings and condiments  Onion salt, garlic salt, seasoned salt, table salt, and sea salt. Worcestershire sauce. Tartar sauce. Barbecue sauce. Teriyaki sauce. Soy sauce, including reduced-sodium. Steak sauce. Canned and packaged gravies. Fish sauce. Oyster sauce. Cocktail sauce. Store-bought horseradish. Ketchup. Mustard. Meat flavorings and tenderizers. Bouillon cubes. Hot sauces. Pre-made or packaged marinades. Pre-made or packaged taco seasonings. Relishes. Regular salad dressings.  Other foods  Salted popcorn and pretzels.  The items listed above may not be a complete list of foods and beverages you should avoid. Contact a dietitian for more information.  Where to find more information  · National Heart, Lung, and Blood [place]: www.nhlbi.nih.gov  · American Heart Association: www.heart.org  · Academy of Nutrition and Dietetics: www.eatright.org  · National Kidney Foundation: www.kidney.org  Summary  · The DASH eating plan is a healthy eating plan that has been shown to reduce high blood pressure (hypertension). It may also reduce your risk for type 2 diabetes, heart  disease, and stroke.  · When on the DASH eating plan, aim to eat more fresh fruits and vegetables, whole grains, lean proteins, low-fat dairy, and heart-healthy fats.  · With the DASH eating plan, you should limit salt (sodium) intake to 2,300 mg a day. If you have hypertension, you may need to reduce your sodium intake to 1,500 mg a day.  · Work with your health care provider or dietitian to adjust your eating plan to your individual calorie needs.  This information is not intended to replace advice given to you by your health care provider. Make sure you discuss any questions you have with your health care provider.  Document Revised: 11/20/2020 Document Reviewed: 11/20/2020  [employer] Patient Education © 2021 [employer]      For more information:    Quit Now [facility]  1-800-QUIT-NOW  https://[place].quitlogix.org/en-[place]/  Steps to Quit Smoking  Smoking tobacco can be harmful to your health and can affect almost every organ in your body. Smoking puts you, and those around you, at risk for developing many serious chronic diseases. Quitting smoking is difficult, but it is one of the best things that you can do for your health. It is never too late to quit.  What are the benefits of quitting smoking?  When you quit smoking, you lower your risk of developing serious diseases and conditions, such as:  · Lung cancer or lung disease, such as COPD.  · Heart disease.  · Stroke.  · Heart attack.  · Infertility.  · Osteoporosis and bone fractures.  Additionally, symptoms such as coughing, wheezing, and shortness of breath may get better when you quit. You may also find that you get sick less often because your body is stronger at fighting off colds and infections. If you are pregnant, quitting smoking can help to reduce your chances of having a baby of low birth weight.  How do I get ready to quit?  When you decide to quit smoking, create a plan to make sure that you are successful. Before you quit:  · Pick a date to quit.  Set a date within the next two weeks to give you time to prepare.  · Write down the reasons why you are quitting. Keep this list in places where you will see it often, such as on your bathroom mirror or in your car or wallet.  · Identify the people, places, things, and activities that make you want to smoke (triggers) and avoid them. Make sure to take these actions:  ¨ Throw away all cigarettes at home, at work, and in your car.  ¨ Throw away smoking accessories, such as ashtrays and lighters.  ¨ Clean your car and make sure to empty the ashtray.  ¨ Clean your home, including curtains and carpets.  · Tell your family, friends, and coworkers that you are quitting. Support from your loved ones can make quitting easier.  · Talk with your health care provider about your options for quitting smoking.  · Find out what treatment options are covered by your health insurance.  What strategies can I use to quit smoking?  Talk with your healthcare provider about different strategies to quit smoking. Some strategies include:  · Quitting smoking altogether instead of gradually lessening how much you smoke over a period of time. Research shows that quitting “cold turkey” is more successful than gradually quitting.  · Attending in-person counseling to help you build problem-solving skills. You are more likely to have success in quitting if you attend several counseling sessions. Even short sessions of 10 minutes can be effective.  · Finding resources and support systems that can help you to quit smoking and remain smoke-free after you quit. These resources are most helpful when you use them often. They can include:  ¨ Online chats with a counselor.  ¨ Telephone quitlines.  ¨ Printed self-help materials.  ¨ Support groups or group counseling.  ¨ Text messaging programs.  ¨ Mobile phone applications.  · Taking medicines to help you quit smoking. (If you are pregnant or breastfeeding, talk with your health care provider first.) Some  medicines contain nicotine and some do not. Both types of medicines help with cravings, but the medicines that include nicotine help to relieve withdrawal symptoms. Your health care provider may recommend:  ¨ Nicotine patches, gum, or lozenges.  ¨ Nicotine inhalers or sprays.  ¨ Non-nicotine medicine that is taken by mouth.  Talk with your health care provider about combining strategies, such as taking medicines while you are also receiving in-person counseling. Using these two strategies together makes you more likely to succeed in quitting than if you used either strategy on its own.  If you are pregnant or breastfeeding, talk with your health care provider about finding counseling or other support strategies to quit smoking. Do not take medicine to help you quit smoking unless told to do so by your health care provider.  What things can I do to make it easier to quit?  Quitting smoking might feel overwhelming at first, but there is a lot that you can do to make it easier. Take these important actions:  · Reach out to your family and friends and ask that they support and encourage you during this time. Call telephone quitlines, reach out to support groups, or work with a counselor for support.  · Ask people who smoke to avoid smoking around you.  · Avoid places that trigger you to smoke, such as bars, parties, or smoke-break areas at work.  · Spend time around people who do not smoke.  · Lessen stress in your life, because stress can be a smoking trigger for some people. To lessen stress, try:  ¨ Exercising regularly.  ¨ Deep-breathing exercises.  ¨ Yoga.  ¨ Meditating.  ¨ Performing a body scan. This involves closing your eyes, scanning your body from head to toe, and noticing which parts of your body are particularly tense. Purposefully relax the muscles in those areas.  · Download or purchase mobile phone or tablet apps (applications) that can help you stick to your quit plan by providing reminders, tips, and  encouragement. There are many free apps, such as QuitGuide from the CDC (Centers for Disease Control and Prevention). You can find other support for quitting smoking (smoking cessation) through smokefree.gov and other websites.  How will I feel when I quit smoking?  Within the first 24 hours of quitting smoking, you may start to feel some withdrawal symptoms. These symptoms are usually most noticeable 2-3 days after quitting, but they usually do not last beyond 2-3 weeks. Changes or symptoms that you might experience include:  · Mood swings.  · Restlessness, anxiety, or irritation.  · Difficulty concentrating.  · Dizziness.  · Strong cravings for sugary foods in addition to nicotine.  · Mild weight gain.  · Constipation.  · Nausea.  · Coughing or a sore throat.  · Changes in how your medicines work in your body.  · A depressed mood.  · Difficulty sleeping (insomnia).  After the first 2-3 weeks of quitting, you may start to notice more positive results, such as:  · Improved sense of smell and taste.  · Decreased coughing and sore throat.  · Slower heart rate.  · Lower blood pressure.  · Clearer skin.  · The ability to breathe more easily.  · Fewer sick days.  Quitting smoking is very challenging for most people. Do not get discouraged if you are not successful the first time. Some people need to make many attempts to quit before they achieve long-term success. Do your best to stick to your quit plan, and talk with your health care provider if you have any questions or concerns.  This information is not intended to replace advice given to you by your health care provider. Make sure you discuss any questions you have with your health care provider.  Document Released: 12/12/2002 Document Revised: 08/15/2017 Document Reviewed: 05/03/2016  [employer] Interactive Patient Education © 2017 [employer]
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
## 2021-12-21 ENCOUNTER — OFFICE VISIT (OUTPATIENT)
Dept: FAMILY MEDICINE CLINIC | Age: 46
End: 2021-12-21
Payer: MEDICARE

## 2021-12-21 VITALS
TEMPERATURE: 97.5 F | BODY MASS INDEX: 25.69 KG/M2 | WEIGHT: 145 LBS | OXYGEN SATURATION: 95 % | SYSTOLIC BLOOD PRESSURE: 110 MMHG | DIASTOLIC BLOOD PRESSURE: 70 MMHG | RESPIRATION RATE: 20 BRPM | HEART RATE: 68 BPM

## 2021-12-21 DIAGNOSIS — F41.1 GENERALIZED ANXIETY DISORDER: Primary | ICD-10-CM

## 2021-12-21 DIAGNOSIS — G47.00 INSOMNIA, UNSPECIFIED TYPE: ICD-10-CM

## 2021-12-21 DIAGNOSIS — M79.2 NERVE PAIN: ICD-10-CM

## 2021-12-21 DIAGNOSIS — F41.9 ANXIETY: ICD-10-CM

## 2021-12-21 PROCEDURE — 4004F PT TOBACCO SCREEN RCVD TLK: CPT | Performed by: NURSE PRACTITIONER

## 2021-12-21 PROCEDURE — G8427 DOCREV CUR MEDS BY ELIG CLIN: HCPCS | Performed by: NURSE PRACTITIONER

## 2021-12-21 PROCEDURE — 99214 OFFICE O/P EST MOD 30 MIN: CPT | Performed by: NURSE PRACTITIONER

## 2021-12-21 PROCEDURE — G8482 FLU IMMUNIZE ORDER/ADMIN: HCPCS | Performed by: NURSE PRACTITIONER

## 2021-12-21 PROCEDURE — G8419 CALC BMI OUT NRM PARAM NOF/U: HCPCS | Performed by: NURSE PRACTITIONER

## 2021-12-21 RX ORDER — TRAZODONE HYDROCHLORIDE 100 MG/1
200 TABLET ORAL NIGHTLY PRN
Qty: 180 TABLET | Refills: 1 | Status: SHIPPED | OUTPATIENT
Start: 2021-12-21 | End: 2022-09-08

## 2021-12-21 RX ORDER — BUSPIRONE HYDROCHLORIDE 15 MG/1
15 TABLET ORAL 3 TIMES DAILY
Qty: 90 TABLET | Refills: 5 | Status: SHIPPED | OUTPATIENT
Start: 2021-12-21 | End: 2022-04-26

## 2021-12-21 RX ORDER — IBUPROFEN 600 MG/1
600 TABLET ORAL 3 TIMES DAILY PRN
Qty: 270 TABLET | Refills: 1 | Status: SHIPPED | OUTPATIENT
Start: 2021-12-21 | End: 2022-06-30

## 2021-12-21 RX ORDER — HYDROXYZINE PAMOATE 25 MG/1
25 CAPSULE ORAL 3 TIMES DAILY PRN
Qty: 60 CAPSULE | Refills: 5 | Status: SHIPPED | OUTPATIENT
Start: 2021-12-21 | End: 2022-04-04

## 2021-12-21 ASSESSMENT — ENCOUNTER SYMPTOMS: BACK PAIN: 1

## 2021-12-21 NOTE — PROGRESS NOTES
SUBJECTIVE:  Panic attack  Patient ID: Jamie Krause is a 51 y.o. female. HPI:   Leg Pain        Pt on Prozac and had stopped Buspirone   Doesn't know why she stopped the med.    Having more     History is very confusing I have contacted all of the pharmacies listed in her chart that I have prescribed to which includes but [facility] the [address] the one that is not listed in her list is [facility] but I have reached out to them on her list she is asking for several refills on medications at [facility] she was getting BuSpar that was filled in May Emmanuelle July and August and then was transferred no it was not even transferred to [facility], [place] her losartan was filled on [id number] and the rest of the med was transferred to [facility] the same I called [facility] and the only medicine they have listed that they are feeling for her is doxepin on hold at [facility] they will is Zanaflex losartan furosemide and atenolol these are too soon to be filled except for the BuSpar I have contacted them and they said it required a PA in discussion with her she said that she has restarted the medication back all of her medicines a month ago however the BuSpar should be needing a refill so I am unsure if she is really taking that but I have called in    She mentions a medication that she used to take when she would have panic attacks which is in the past she has been on Xanax she does not want to start that again she mentioned Clonopin acid was kind in the same category and that I was not prescribing it in light of the fact that she has been removed from some pain managements for urine drug test that was not appropriate and also refusal to come in for drug counts pill counts I am very hesitant in prescribing anything that is scheduled drug of office of her to center sent to psychiatry however she thinks that they would put something bad in her record and that it would be permanently on their    She is now asking for something for pain I have looked at her GFR and it is greater than 16 creatinine is 0.7 so I will call her and her request for ibuprofen. Past Medical History:   Diagnosis Date    Allergic rhinitis     Cancer ([address]     cervical, lymphoma    Congenital heart disease     Mesenteric artery stenosis (HCC)     MRSA infection     Stroke [facility])     after heart surgery 2013      Prior to Visit Medications    Medication Sig Taking? Authorizing Provider   traZODone (DESYREL) 100 MG tablet Take 2 tablets by mouth nightly as needed for Sleep Yes MELIDA Schilling   busPIRone (BUSPAR) 15 MG tablet Take 15 mg by mouth 3 times daily Yes MELIDA Schilling   hydrOXYzine (VISTARIL) 25 MG capsule Take 1 capsule by mouth 3 times daily as needed for Anxiety Yes MELIDA Schilling   ibuprofen (ADVIL;MOTRIN) 600 MG tablet Take 1 tablet by mouth 3 times daily as needed for Pain Yes MELIDA Schilling   doxepin (SINEQUAN) 25 MG capsule Take 1 capsule by mouth nightly Yes MELIDA Schilling   tiZANidine (ZANAFLEX) 4 MG tablet Take 1 tablet by mouth 3 times daily as needed (hip and back pain) Yes MELIDA Schilling   furosemide (LASIX) 20 MG tablet Take 1 tablet by mouth 2 times daily Yes MELIDA Schilling   losartan (COZAAR) 50 MG tablet Take 1/2 (one-half) tablet by mouth once daily Yes MELIDA Schilling   lidocaine (LIDODERM) 5 % Place 1 patch onto the skin daily 12 hours on, 12 hours off.  Yes MELIDA Schilling   FLUoxetine (PROZAC) 40 MG capsule Take 1 capsule by mouth daily Yes MELIDA Schilling   cilostazol (PLETAL) 100 MG tablet Take 1 tablet by mouth 2 times daily Yes MELIDA Brasher   albuterol sulfate HFA (VENTOLIN HFA) 108 (90 Base) MCG/ACT inhaler Inhale 2 puffs into the lungs 4 times daily as needed for Wheezing Yes MELIDA Schilling   Cranberry 125 MG TABS Take by mouth Yes Historical Provider, MD   aspirin 81 MG EC tablet Take 81 mg by mouth daily Yes Historical Provider, MD   albuterol (ACCUNEB) 1.25 MG/3ML nebulizer solution Inhale 3 mLs into the lungs every 6 hours as needed for Wheezing Yes MELIDA Franco   atorvastatin (LIPITOR) 40 MG tablet Take 40 mg by mouth daily Yes Historical Provider, MD   vitamin D3 (CHOLECALCIFEROL) 10 MCG (400 UNIT) TABS tablet Take 400 Units by mouth daily Yes Historical Provider, MD   vitamin B-12 (CYANOCOBALAMIN) 1000 MCG tablet Take 1,000 mcg by mouth daily Yes Historical Provider, MD     Allergies   Allergen Reactions    Ceclor [Cefaclor] Swelling    Lortab [Hydrocodone-Acetaminophen]     Penicillins Hives    Sulfa Antibiotics     Nabumetone Nausea And Vomiting       Review of Systems   Musculoskeletal: Positive for arthralgias, back pain and myalgias. Psychiatric/Behavioral: Positive for agitation, dysphoric mood and sleep disturbance. The patient is nervous/anxious. OBJECTIVE:    Physical Exam  Constitutional:       Appearance: Normal appearance. She is well-developed. She is not ill-appearing. HENT:      Head: Normocephalic and atraumatic. Right Ear: External ear normal.      Left Ear: External ear normal.      Nose: Nose normal.      Mouth/Throat:      Lips: Pink. Mouth: Mucous membranes are moist.   Eyes:      General: Lids are normal.      Extraocular Movements: Extraocular movements intact. Conjunctiva/sclera: Conjunctivae normal.   Cardiovascular:      Rate and Rhythm: Normal rate and regular rhythm. Heart sounds: Normal heart sounds. No murmur heard. Pulmonary:      Effort: Pulmonary effort is normal.      Breath sounds: Normal breath sounds. Musculoskeletal:      Cervical back: Normal range of motion. Skin:     General: Skin is warm and dry. Neurological:      Mental Status: She is alert and oriented to person, place, and time. Motor: No weakness or tremor. Coordination: Coordination is intact.    Psychiatric: Attention and Perception: Attention and perception normal.         Mood and Affect: Mood normal.         Speech: Speech normal.         Behavior: Behavior normal. Behavior is cooperative. Thought Content: Thought content normal.         Cognition and Memory: Cognition and memory normal.         Judgment: Judgment normal.        /70 (Site: Left Upper Arm, Position: Sitting, Cuff Size: Medium Adult)   Pulse 68   Temp 97.5 °F (36.4 °C) (Temporal)   Resp 20   Wt 145 lb (65.8 kg)   SpO2 95%   BMI 25.69 kg/m²      ASSESSMENT:      ICD-10-CM    1. Generalized anxiety disorder  F41.1 busPIRone (BUSPAR) 15 MG tablet   2. Anxiety  F41.9 busPIRone (BUSPAR) 15 MG tablet     hydrOXYzine (VISTARIL) 25 MG capsule   3. Insomnia, unspecified type  G47.00 traZODone (DESYREL) 100 MG tablet   4. Nerve pain  M79.2 ibuprofen (ADVIL;MOTRIN) 600 MG tablet       PLAN:    Nae Meyers: Panic Attack (Panic attacks was treated years ago for this and stopped medication . She has recently started having them again . suffers from pain in back and legs and saw a Neurologist about pain pump. they are now sending her to Pain management to have this done. all this is making her more anxious . ), Insomnia (is running out of trazadone due to taking 2 tablets nightly instead of one . ), Anxiety (needs refills on medication ), and Leg Pain (leg pain . needs tizanidine )    Patient has been notified we are only going to use 1 pharmacy adrafinil also I have notified her that she is now with neurology who is referred her to pain management and if this time she finds fault with pain management or something happens I am no longer going to prescribe or send her to a different pain management  Diagnosis and orders for thisvisit are above. Consulted on use of multi pharmacy and meds with different providers. And need to follow through with meds and be consistent 45 minutes    All patient questions answered. Pt voiced understanding. Reviewedhealth maintenance. Instructed to continue current medications, diet and exercise. Patient agreed with treatment plan. Follow up as directed.

## 2021-12-22 ENCOUNTER — PATIENT ROUNDING (BHMG ONLY) (OUTPATIENT)
Dept: NEUROSURGERY | Facility: CLINIC | Age: 46
End: 2021-12-22

## 2021-12-22 NOTE — PROGRESS NOTES
A My-Chart message has been sent to the patient for PATIENT ROUNDING with [facility].

## 2021-12-27 RX ORDER — CILOSTAZOL 100 MG/1
100 TABLET ORAL 2 TIMES DAILY
Qty: 60 TABLET | Refills: 3 | Status: SHIPPED | OUTPATIENT
Start: 2021-12-27 | End: 2022-06-29

## 2021-12-29 DIAGNOSIS — M25.551 RIGHT HIP PAIN: Primary | ICD-10-CM

## 2022-01-03 ENCOUNTER — TELEPHONE (OUTPATIENT)
Dept: NEUROSURGERY | Facility: CLINIC | Age: 47
End: 2022-01-03

## 2022-01-03 ENCOUNTER — HOSPITAL ENCOUNTER (OUTPATIENT)
Dept: NON INVASIVE DIAGNOSTICS | Age: 47
Discharge: HOME | End: 2022-01-03
Payer: MEDICARE

## 2022-01-03 DIAGNOSIS — M54.42 CHRONIC LEFT-SIDED LOW BACK PAIN WITH LEFT-SIDED SCIATICA: Primary | ICD-10-CM

## 2022-01-03 DIAGNOSIS — M50.30 DEGENERATION OF CERVICAL INTERVERTEBRAL DISC: ICD-10-CM

## 2022-01-03 DIAGNOSIS — G89.29 CHRONIC LEFT-SIDED LOW BACK PAIN WITH LEFT-SIDED SCIATICA: Primary | ICD-10-CM

## 2022-01-03 DIAGNOSIS — I70.213 ATHEROSCLEROSIS OF NATIVE ARTERY OF BOTH LOWER EXTREMITIES WITH INTERMITTENT CLAUDICATION (HCC): ICD-10-CM

## 2022-01-03 PROCEDURE — 93923 UPR/LXTR ART STDY 3+ LVLS: CPT

## 2022-01-03 NOTE — TELEPHONE ENCOUNTER
Caller: Maday Fung    Relationship: Self    Best call back number: [phone]    What is the medical concern/diagnosis: Chronic left-sided low back pain without sciatica     What specialty or service is being requested: PAIN MANAGEMENT (FOR PAIN PUMP TRIAL)    What is the provider, practice or medical service name: DR KERN, [facility]    What is the office location: [place], [place]    What is the office phone number: [phone]    Any additional details: PATIENT HAS CONTACTED CLINICS LOCALLY, BUT HAS HAD NO LUCK. SHE WOULD LIKE TO BE REFERRED TO DR KERN FOR PAIN PUMP TRIAL. PLEASE ADVISE. THANK YOU.

## 2022-01-07 ENCOUNTER — VIRTUAL VISIT (OUTPATIENT)
Dept: VASCULAR SURGERY | Age: 47
End: 2022-01-07
Payer: MEDICARE

## 2022-01-07 DIAGNOSIS — I70.213 ATHEROSCLER OF NATIVE ARTERY OF BOTH LEGS WITH INTERMIT CLAUDICATION (HCC): Primary | ICD-10-CM

## 2022-01-07 PROCEDURE — 99441 PR PHYS/QHP TELEPHONE EVALUATION 5-10 MIN: CPT | Performed by: NURSE PRACTITIONER

## 2022-01-07 NOTE — PROGRESS NOTES
Tino Barton is a 51 y.o. female evaluated via telephone on 2022. Tino Barton (:  1975) is a 51 y.o. female,Established patient, here for evaluation of the following chief complaint(s):     Consent:  She and/or health care decision maker is aware that that she may receive a bill for this telephone service, depending on her insurance coverage, and has provided verbal consent to proceed: Yes    Patient is located at home  Provider is located at [facility]   Also present during call is no one    Herb Dumont has a history of peripheral vascular disease of the lower extremities. She has had this for 1 - 5 years. Current treatment includes ASA EC daily. Herb Dumont has not had new wounds. Recently, she reports hip pain left hip with lying, sitting, standing, and walking. It is worse. She is looking to have pain pump inserted.   No wounds      Tino Barton is a 51 y.o. female with the following history [facility]:  Patient Active Problem List    Diagnosis Date Noted    Lymphoma of intra-abdominal lymph nodes, unspecified lymphoma type [facility] 2021    Atherosclerosis of native artery of both lower extremities with intermittent claudication ([facility] 2021    Mesenteric artery stenosis (HCC)     Nerve pain 2021    Pain medication agreement 2021    Chest pain with moderate risk for cardiac etiology 2021    Cognitive deficit as late effect of cerebrovascular accident (CVA) 2021    Generalized anxiety disorder 2021    Dyslipidemia 2021    Chronic bilateral low back pain without sciatica 2021     Current Outpatient Medications   Medication Sig Dispense Refill    cilostazol (PLETAL) 100 MG tablet Take 1 tablet by mouth 2 times daily 60 tablet 3    traZODone (DESYREL) 100 MG tablet Take 2 tablets by mouth nightly as needed for Sleep 180 tablet 1    busPIRone (BUSPAR) 15 MG tablet Take 15 mg by mouth 3 times daily 90 tablet 5    hydrOXYzine (VISTARIL) 25 MG capsule Take 1 capsule by mouth 3 times daily as needed for Anxiety 60 capsule 5    ibuprofen (ADVIL;MOTRIN) 600 MG tablet Take 1 tablet by mouth 3 times daily as needed for Pain 270 tablet 1    doxepin (SINEQUAN) 25 MG capsule Take 1 capsule by mouth nightly 30 capsule 5    tiZANidine (ZANAFLEX) 4 MG tablet Take 1 tablet by mouth 3 times daily as needed (hip and back pain) 60 tablet 5    furosemide (LASIX) 20 MG tablet Take 1 tablet by mouth 2 times daily 60 tablet 5    losartan (COZAAR) 50 MG tablet Take 1/2 (one-half) tablet by mouth once daily 30 tablet 5    lidocaine (LIDODERM) 5 % Place 1 patch onto the skin daily 12 hours on, 12 hours off. 60 patch 5    FLUoxetine (PROZAC) 40 MG capsule Take 1 capsule by mouth daily 30 capsule 5    albuterol sulfate HFA (VENTOLIN HFA) 108 (90 Base) MCG/ACT inhaler Inhale 2 puffs into the lungs 4 times daily as needed for Wheezing 1 Inhaler 5    Cranberry 125 MG TABS Take by mouth      aspirin 81 MG EC tablet Take 81 mg by mouth daily      albuterol (ACCUNEB) 1.25 MG/3ML nebulizer solution Inhale 3 mLs into the lungs every 6 hours as needed for Wheezing 360 mL 3    atorvastatin (LIPITOR) 40 MG tablet Take 40 mg by mouth daily      vitamin D3 (CHOLECALCIFEROL) 10 MCG (400 UNIT) TABS tablet Take 400 Units by mouth daily      vitamin B-12 (CYANOCOBALAMIN) 1000 MCG tablet Take 1,000 mcg by mouth daily       No current facility-administered medications for this visit.      Allergies: Ceclor [cefaclor], Lortab [hydrocodone-acetaminophen], Penicillins, Sulfa antibiotics, and Nabumetone  Past Medical History:   Diagnosis Date    Allergic rhinitis     Cancer [address]     cervical, lymphoma    Congenital heart disease     Mesenteric artery stenosis ([facility]     MRSA infection     Stroke [facility])     after heart surgery 2013     Past Surgical History:   Procedure Laterality Date   [facility] CARDIAC SURGERY      Ross procedure and surgery prior on valve    PARTIAL HYSTERECTOMY       Family History   Problem Relation Age of Onset    Diabetes Mother     Heart Disease Mother     Cancer Father      Social History     Tobacco Use    Smoking status: Current Every Day Smoker     Packs/day: 1.00     Types: Cigarettes     Start date: 200    Smokeless tobacco: Never Used   Substance Use Topics    Alcohol use: Never       Patient-Reported Vitals 12/31/2021   Patient-Reported Weight 145   Patient-Reported Height 52   Patient-Reported Systolic 035   Patient-Reported Diastolic 85   Patient-Reported Pulse 112   Patient-Reported Temperature -   Patient-Reported SpO2 -   Patient-Reported Peak Flow -       Review of Systems      Eyes  no sudden vision change or amaurosis. Respiratory  no significant shortness of breath, wheezing, or stridor. No cough,  Cardiovascular  no chest pain, syncope, or significant dizziness. No significant leg swelling. has had claudication. Skin   has not had new wound. Neurologic   No speech difficulty or lateralizing weakness. Psychiatric  no severe anxiety or nervousness. No confusion. All other review of systems are negative. PHYSICAL EXAMINATION:    [ INSTRUCTIONS:  \"[x]\" Indicates a positive item  \"[]\" Indicates a negative item  -- DELETE ALL ITEMS NOT EXAMINED]    [x] Alert  [x] Oriented to person/place/time    [x] No apparent distress  [] Toxic appearing  [x] Normal Mood  [] Anxious appearing    [] Depressed appearing  [] Confused appearing      [] Poor short term memory  [] Poor long term memory   Memory appears to be intact       Lower extremity arterial study: Right ROVERTO 1.08, Left ROVERTO 0.84. Individual films reviewed: Yes. These results were reviewed with the patient. Disease process is stable and chronic        Assessment    1. Atheroscler of native artery of both legs with intermit claudication ([facility] [address]          Plan    1.  Atheroscler of native artery of both legs with intermit claudication ([facility])  -      LOWER EXTREMITY ARTERIAL SEGMENTAL PRESSURES W PPG; Future    Patient instructed to walk as much as possible. Call our office with any progressive pain in leg(s) or hip(s) with walking. Take good care of your feet. Let us know right away if you develop a wound on your foot. Asa ec daily  Continue pletal  Strongly encouraged start/continue statin therapy  Recommended no smoking        Documentation:  I communicated with the patient and/or health care decision maker about pvd. Details of this discussion including any medical advice provided: as above      I affirm this is a Patient Initiated Episode with a Patient who has not had a related appointment within my department in the past 7 days or scheduled within the next 24 hours. Patient identification was verified at the start of the visit: Yes    Total Time: minutes: 5-10 minutes    The visit was conducted pursuant to the emergency declaration under the [facility] authority and the [employer] General Act. Patient identification was verified, and a caregiver was present when appropriate. The patient was located in a state where the provider was credentialed to provide care.     Note: not billable if this call serves to triage the patient into an appointment for the relevant concern      MELIDA Chiang

## 2022-01-11 ENCOUNTER — OFFICE VISIT (OUTPATIENT)
Dept: FAMILY MEDICINE CLINIC | Age: 47
End: 2022-01-11
Payer: MEDICARE

## 2022-01-11 VITALS
TEMPERATURE: 97 F | DIASTOLIC BLOOD PRESSURE: 84 MMHG | OXYGEN SATURATION: 95 % | SYSTOLIC BLOOD PRESSURE: 136 MMHG | BODY MASS INDEX: 26.39 KG/M2 | HEART RATE: 96 BPM | WEIGHT: 149 LBS | RESPIRATION RATE: 22 BRPM

## 2022-01-11 DIAGNOSIS — G89.29 CHRONIC BILATERAL LOW BACK PAIN WITHOUT SCIATICA: Primary | ICD-10-CM

## 2022-01-11 DIAGNOSIS — J45.41 MODERATE PERSISTENT ASTHMA WITH ACUTE EXACERBATION: ICD-10-CM

## 2022-01-11 DIAGNOSIS — M54.50 CHRONIC BILATERAL LOW BACK PAIN WITHOUT SCIATICA: Primary | ICD-10-CM

## 2022-01-11 PROCEDURE — G8427 DOCREV CUR MEDS BY ELIG CLIN: HCPCS | Performed by: NURSE PRACTITIONER

## 2022-01-11 PROCEDURE — G8419 CALC BMI OUT NRM PARAM NOF/U: HCPCS | Performed by: NURSE PRACTITIONER

## 2022-01-11 PROCEDURE — 4004F PT TOBACCO SCREEN RCVD TLK: CPT | Performed by: NURSE PRACTITIONER

## 2022-01-11 PROCEDURE — 99213 OFFICE O/P EST LOW 20 MIN: CPT | Performed by: NURSE PRACTITIONER

## 2022-01-11 PROCEDURE — G8482 FLU IMMUNIZE ORDER/ADMIN: HCPCS | Performed by: NURSE PRACTITIONER

## 2022-01-11 RX ORDER — ALBUTEROL SULFATE 1.25 MG/3ML
1 SOLUTION RESPIRATORY (INHALATION) EVERY 6 HOURS PRN
Qty: 360 ML | Refills: 3 | Status: SHIPPED | OUTPATIENT
Start: 2022-01-11 | End: 2022-04-26 | Stop reason: SDUPTHER

## 2022-01-11 RX ORDER — PREDNISONE 20 MG/1
20 TABLET ORAL 2 TIMES DAILY
Qty: 10 TABLET | Refills: 0 | Status: SHIPPED | OUTPATIENT
Start: 2022-01-11 | End: 2022-01-16

## 2022-01-11 ASSESSMENT — ENCOUNTER SYMPTOMS
WHEEZING: 1
COUGH: 1
SHORTNESS OF BREATH: 1
BACK PAIN: 1

## 2022-01-11 NOTE — PROGRESS NOTES
25 MG capsule Take 1 capsule by mouth nightly Yes MELIDA Sung   tiZANidine (ZANAFLEX) 4 MG tablet Take 1 tablet by mouth 3 times daily as needed (hip and back pain) Yes MELIDA Sung   furosemide (LASIX) 20 MG tablet Take 1 tablet by mouth 2 times daily Yes MELIDA Sung   losartan (COZAAR) 50 MG tablet Take 1/2 (one-half) tablet by mouth once daily Yes MELIDA Sung   lidocaine (LIDODERM) 5 % Place 1 patch onto the skin daily 12 hours on, 12 hours off. Yes MELIDA Sung   FLUoxetine (PROZAC) 40 MG capsule Take 1 capsule by mouth daily Yes MELIDA Sung   albuterol sulfate HFA (VENTOLIN HFA) 108 (90 Base) MCG/ACT inhaler Inhale 2 puffs into the lungs 4 times daily as needed for Wheezing Yes MELIDA Sung   Cranberry 125 MG TABS Take by mouth Yes Historical Provider, MD   aspirin 81 MG EC tablet Take 81 mg by mouth daily Yes Historical Provider, MD   atorvastatin (LIPITOR) 40 MG tablet Take 40 mg by mouth daily Yes Historical Provider, MD   vitamin D3 (CHOLECALCIFEROL) 10 MCG (400 UNIT) TABS tablet Take 400 Units by mouth daily Yes Historical Provider, MD   vitamin B-12 (CYANOCOBALAMIN) 1000 MCG tablet Take 1,000 mcg by mouth daily Yes Historical Provider, MD      Allergies   Allergen Reactions    Ceclor [Cefaclor] Swelling    Lortab [Hydrocodone-Acetaminophen]     Penicillins Hives    Sulfa Antibiotics     Nabumetone Nausea And Vomiting       Review of Systems   Respiratory: Positive for cough, shortness of breath and wheezing. Musculoskeletal: Positive for arthralgias, back pain and myalgias. OBJECTIVE:    Physical Exam  Constitutional:       Appearance: Normal appearance. She is well-developed. She is not ill-appearing. HENT:      Head: Normocephalic and atraumatic. Right Ear: External ear normal.      Left Ear: External ear normal.      Nose: Nose normal.      Mouth/Throat:      Lips: Pink.       Mouth: Mucous membranes are moist.   Eyes:      General: Lids are normal.      Extraocular Movements: Extraocular movements intact. Conjunctiva/sclera: Conjunctivae normal.   Cardiovascular:      Rate and Rhythm: Normal rate and regular rhythm. Heart sounds: Normal heart sounds. No murmur heard. Pulmonary:      Effort: Pulmonary effort is normal.      Breath sounds: Wheezing present. Musculoskeletal:      Cervical back: Normal range of motion. Skin:     General: Skin is warm and dry. Neurological:      Mental Status: She is alert and oriented to person, place, and time. Motor: No weakness or tremor. Coordination: Coordination is intact. Psychiatric:         Attention and Perception: Attention and perception normal.         Mood and Affect: Mood normal.         Speech: Speech normal.         Behavior: Behavior normal. Behavior is cooperative. Thought Content: Thought content normal.         Cognition and Memory: Cognition and memory normal.         Judgment: Judgment normal.        /84 (Site: Left Upper Arm, Position: Sitting, Cuff Size: Medium Adult)   Pulse 96   Temp 97 °F (36.1 °C) (Temporal)   Resp 22   Wt 149 lb (67.6 kg)   SpO2 95%   BMI 26.39 kg/m²      ASSESSMENT:      ICD-10-CM    1. Chronic bilateral low back pain without sciatica  M54.50 External Referral To Pain Clinic    G89.29    2. Moderate persistent asthma with acute exacerbation  J45.41 albuterol (ACCUNEB) 1.25 MG/3ML nebulizer solution     predniSONE (DELTASONE) 20 MG tablet       PLAN:    Nae Meyers: Back Pain (lower back pain . is needing referral to Dr Reese Bowden in [facility] for pain pump ), Hip Pain (right hip pain . needs referral for pain pump ), and Wheezing (due to heart and lung problems she needs refill of nebulizer medicaine )      Diagnosis and orders for this visit are above.

## 2022-01-25 ENCOUNTER — OFFICE VISIT (OUTPATIENT)
Dept: FAMILY MEDICINE CLINIC | Age: 47
End: 2022-01-25
Payer: MEDICARE

## 2022-01-25 VITALS
HEART RATE: 64 BPM | WEIGHT: 149 LBS | DIASTOLIC BLOOD PRESSURE: 72 MMHG | RESPIRATION RATE: 22 BRPM | TEMPERATURE: 96.5 F | BODY MASS INDEX: 26.39 KG/M2 | OXYGEN SATURATION: 95 % | SYSTOLIC BLOOD PRESSURE: 98 MMHG

## 2022-01-25 DIAGNOSIS — J40 BRONCHITIS: ICD-10-CM

## 2022-01-25 DIAGNOSIS — Z01.818 PREOP EXAMINATION: Primary | ICD-10-CM

## 2022-01-25 DIAGNOSIS — Z01.818 PREOP EXAMINATION: ICD-10-CM

## 2022-01-25 LAB
ALBUMIN SERPL-MCNC: 4.3 G/DL (ref 3.5–5.2)
ALP BLD-CCNC: 80 U/L (ref 35–104)
ALT SERPL-CCNC: 15 U/L (ref 5–33)
ANION GAP SERPL CALCULATED.3IONS-SCNC: 17 MMOL/L (ref 7–19)
AST SERPL-CCNC: 13 U/L (ref 5–32)
BILIRUB SERPL-MCNC: 0.3 MG/DL (ref 0.2–1.2)
BUN BLDV-MCNC: 9 MG/DL (ref 6–20)
CALCIUM SERPL-MCNC: 9.4 MG/DL (ref 8.6–10)
CHLORIDE BLD-SCNC: 102 MMOL/L (ref 98–111)
CO2: 26 MMOL/L (ref 22–29)
CREAT SERPL-MCNC: 0.9 MG/DL (ref 0.5–0.9)
GFR AFRICAN AMERICAN: >59
GFR NON-AFRICAN AMERICAN: >60
GLUCOSE BLD-MCNC: 101 MG/DL (ref 74–109)
POTASSIUM SERPL-SCNC: 3.4 MMOL/L (ref 3.5–5)
SODIUM BLD-SCNC: 145 MMOL/L (ref 136–145)
TOTAL PROTEIN: 7.4 G/DL (ref 6.6–8.7)

## 2022-01-25 PROCEDURE — 4004F PT TOBACCO SCREEN RCVD TLK: CPT | Performed by: NURSE PRACTITIONER

## 2022-01-25 PROCEDURE — G8482 FLU IMMUNIZE ORDER/ADMIN: HCPCS | Performed by: NURSE PRACTITIONER

## 2022-01-25 PROCEDURE — 99213 OFFICE O/P EST LOW 20 MIN: CPT | Performed by: NURSE PRACTITIONER

## 2022-01-25 PROCEDURE — G8419 CALC BMI OUT NRM PARAM NOF/U: HCPCS | Performed by: NURSE PRACTITIONER

## 2022-01-25 PROCEDURE — 96372 THER/PROPH/DIAG INJ SC/IM: CPT | Performed by: NURSE PRACTITIONER

## 2022-01-25 PROCEDURE — G8427 DOCREV CUR MEDS BY ELIG CLIN: HCPCS | Performed by: NURSE PRACTITIONER

## 2022-01-25 RX ORDER — TRIAMCINOLONE ACETONIDE 40 MG/ML
40 INJECTION, SUSPENSION INTRA-ARTICULAR; INTRAMUSCULAR ONCE
Status: COMPLETED | OUTPATIENT
Start: 2022-01-25 | End: 2022-01-25

## 2022-01-25 RX ORDER — DEXAMETHASONE SODIUM PHOSPHATE 10 MG/ML
4 INJECTION INTRAMUSCULAR; INTRAVENOUS ONCE
Status: COMPLETED | OUTPATIENT
Start: 2022-01-25 | End: 2022-01-25

## 2022-01-25 RX ADMIN — TRIAMCINOLONE ACETONIDE 40 MG: 40 INJECTION, SUSPENSION INTRA-ARTICULAR; INTRAMUSCULAR at 10:55

## 2022-01-25 RX ADMIN — DEXAMETHASONE SODIUM PHOSPHATE 4 MG: 10 INJECTION INTRAMUSCULAR; INTRAVENOUS at 10:52

## 2022-01-25 ASSESSMENT — ENCOUNTER SYMPTOMS
SHORTNESS OF BREATH: 1
COUGH: 1
BACK PAIN: 1
WHEEZING: 1

## 2022-01-25 NOTE — PATIENT INSTRUCTIONS
After obtaining consent, and per orders of Dr. Martin Madrid, injection of Kenalog and Decadron given in Left upper quad. gluteus by Justin Lebron. Patient instructed to remain in clinic for 20 minutes afterwards, and to report any adverse reaction to me immediately.

## 2022-01-25 NOTE — PROGRESS NOTES
SUBJECTIVE:  Needing CMP for pain pump placement   Patient ID: Cayla Motta is a 51 y.o. female. HPI:   HPI   Wanting a Pain pump and Dr. Cynthia Alvares is needing a CMP. Also wanting a steroid injection due to wheezing. Has only smoked 2 cigarette at the [facility] it is due to shortness of air     She meets with Dr. Cynthia Alvares 31 of this month      Past Medical History:   Diagnosis Date    Allergic rhinitis     Cancer ([facility]     cervical, lymphoma    Congenital heart disease     Mesenteric artery stenosis ([facility]     MRSA infection     Stroke [facility])     after heart surgery 2013      Prior to Visit Medications    Medication Sig Taking? Authorizing Provider   albuterol (ACCUNEB) 1.25 MG/3ML nebulizer solution Inhale 3 mLs into the lungs every 6 hours as needed for Wheezing Yes MELIDA Falcon   cilostazol (PLETAL) 100 MG tablet Take 1 tablet by mouth 2 times daily Yes MELIDA Camargo   traZODone (DESYREL) 100 MG tablet Take 2 tablets by mouth nightly as needed for Sleep Yes MELIDA Falcon   busPIRone (BUSPAR) 15 MG tablet Take 15 mg by mouth 3 times daily Yes MELIDA Falcon   hydrOXYzine (VISTARIL) 25 MG capsule Take 1 capsule by mouth 3 times daily as needed for Anxiety Yes MELIDA Falcon   ibuprofen (ADVIL;MOTRIN) 600 MG tablet Take 1 tablet by mouth 3 times daily as needed for Pain Yes MELIDA Falcon   doxepin (SINEQUAN) 25 MG capsule Take 1 capsule by mouth nightly Yes MELIDA Falcon   tiZANidine (ZANAFLEX) 4 MG tablet Take 1 tablet by mouth 3 times daily as needed (hip and back pain) Yes MELIDA Falcon   furosemide (LASIX) 20 MG tablet Take 1 tablet by mouth 2 times daily Yes MELIDA Falcon   losartan (COZAAR) 50 MG tablet Take 1/2 (one-half) tablet by mouth once daily Yes MELIDA Falcon   lidocaine (LIDODERM) 5 % Place 1 patch onto the skin daily 12 hours on, 12 hours off.  Yes MELIDA Falcon   FLUoxetine (PROZAC) 40 MG capsule posterior oropharyngeal erythema or uvula swelling. Tonsils: No tonsillar exudate or tonsillar abscesses. Eyes:      General: Lids are normal.         Right eye: No discharge. Left eye: No discharge. Extraocular Movements: Extraocular movements intact. Conjunctiva/sclera: Conjunctivae normal.      Right eye: Right conjunctiva is not injected. No exudate. Left eye: Left conjunctiva is not injected. No exudate. Cardiovascular:      Rate and Rhythm: Normal rate and regular rhythm. Heart sounds: Normal heart sounds. No murmur heard. Pulmonary:      Effort: Pulmonary effort is normal. No respiratory distress. Breath sounds: Decreased breath sounds and wheezing present. No rhonchi or rales. Abdominal:      General: Bowel sounds are normal.      Palpations: Abdomen is soft. Musculoskeletal:         General: Normal range of motion. Cervical back: Normal range of motion and neck supple. No rigidity. Normal range of motion. Right lower leg: No edema. Left lower leg: No edema. Lymphadenopathy:      Cervical: No cervical adenopathy. Right cervical: No superficial cervical adenopathy. Left cervical: No superficial cervical adenopathy. Skin:     General: Skin is warm and dry. Coloration: Skin is not pale. Neurological:      Mental Status: She is alert and oriented to person, place, and time. Psychiatric:         Attention and Perception: Attention normal.         Mood and Affect: Mood normal.         Behavior: Behavior normal. Behavior is cooperative. BP 98/72 (Site: Left Upper Arm, Position: Sitting, Cuff Size: Medium Adult)   Pulse 64   Temp 96.5 °F (35.8 °C) (Temporal)   Resp 22   Wt 149 lb (67.6 kg)   SpO2 95%   BMI 26.39 kg/m²      ASSESSMENT:      ICD-10-CM    1. Preop examination  Z01.818 Comprehensive Metabolic Panel   2.  Bronchitis  J40 dexamethasone (DECADRON) injection 4 mg     triamcinolone acetonide (KENALOG-40) injection 40 mg       PLAN:    Nae Meyers: Wheezing (left lung pain feels like pleuricy getting worse ) and Back Pain (has an appointment for 01/31/2022 with Dr Carlota Flores in [place] she is needing metabolic panel )  Recommended she get a copy of the CMP results to take to her appointment     Diagnosis and orders for this visit are above.

## 2022-02-17 RX ORDER — BUSPIRONE HYDROCHLORIDE 5 MG/1
TABLET ORAL
Qty: 90 TABLET | Refills: 1 | Status: SHIPPED | OUTPATIENT
Start: 2022-02-17 | End: 2022-09-15

## 2022-02-24 DIAGNOSIS — G47.00 INSOMNIA, UNSPECIFIED TYPE: ICD-10-CM

## 2022-02-24 RX ORDER — DOXEPIN HYDROCHLORIDE 25 MG/1
CAPSULE ORAL
Qty: 90 CAPSULE | Refills: 1 | Status: SHIPPED | OUTPATIENT
Start: 2022-02-24 | End: 2022-06-30

## 2022-03-17 RX ORDER — FLUTICASONE PROPIONATE AND SALMETEROL XINAFOATE 115; 21 UG/1; UG/1
AEROSOL, METERED RESPIRATORY (INHALATION)
Qty: 24 G | Refills: 2 | Status: SHIPPED | OUTPATIENT
Start: 2022-03-17 | End: 2022-03-24 | Stop reason: DRUGHIGH

## 2022-03-17 RX ORDER — TIZANIDINE 4 MG/1
TABLET ORAL
Qty: 60 TABLET | Refills: 5 | Status: SHIPPED | OUTPATIENT
Start: 2022-03-17

## 2022-03-18 ENCOUNTER — NURSE TRIAGE (OUTPATIENT)
Dept: OTHER | Facility: CLINIC | Age: 47
End: 2022-03-18

## 2022-03-18 NOTE — TELEPHONE ENCOUNTER
Received call from Indira Rodrigues at [facility], [facility] caller not on line. Complaint: Pain on left side of back and SOB.      Practice Name: [facility]     Caller's telephone number verified as [phone]    Unsuccessful attempt to re-connect with caller via phone, left message for return call to office            Reason for Disposition   Message left on identified voicemail    Protocols used: NO CONTACT OR DUPLICATE CONTACT CALL-ADULT-[place]

## 2022-03-24 ENCOUNTER — OFFICE VISIT (OUTPATIENT)
Dept: FAMILY MEDICINE CLINIC | Age: 47
End: 2022-03-24
Payer: MEDICARE

## 2022-03-24 VITALS
BODY MASS INDEX: 25.86 KG/M2 | SYSTOLIC BLOOD PRESSURE: 138 MMHG | HEART RATE: 72 BPM | OXYGEN SATURATION: 96 % | RESPIRATION RATE: 20 BRPM | DIASTOLIC BLOOD PRESSURE: 78 MMHG | WEIGHT: 146 LBS | TEMPERATURE: 97.2 F

## 2022-03-24 DIAGNOSIS — R23.9 SKIN CHANGE: ICD-10-CM

## 2022-03-24 DIAGNOSIS — J44.1 CHRONIC OBSTRUCTIVE PULMONARY DISEASE WITH ACUTE EXACERBATION (HCC): Primary | ICD-10-CM
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
## 2022-03-24 DIAGNOSIS — F33.1 MODERATE EPISODE OF RECURRENT MAJOR DEPRESSIVE DISORDER (HCC): ICD-10-CM

## 2022-03-24 DIAGNOSIS — S41.112A LACERATION OF MULTIPLE SITES OF LEFT UPPER EXTREMITY, INITIAL ENCOUNTER: ICD-10-CM

## 2022-03-24 DIAGNOSIS — C85.93 LYMPHOMA OF INTRA-ABDOMINAL LYMPH NODES, UNSPECIFIED LYMPHOMA TYPE (HCC): ICD-10-CM

## 2022-03-24 DIAGNOSIS — I50.32 CHRONIC DIASTOLIC CONGESTIVE HEART FAILURE (HCC): ICD-10-CM

## 2022-03-24 DIAGNOSIS — E78.5 DYSLIPIDEMIA: ICD-10-CM

## 2022-03-24 DIAGNOSIS — T14.8XXA SCRATCH MARK: ICD-10-CM

## 2022-03-24 LAB
ALBUMIN SERPL-MCNC: 4.7 G/DL (ref 3.5–5.2)
ALP BLD-CCNC: 102 U/L (ref 35–104)
ALT SERPL-CCNC: 10 U/L (ref 5–33)
ANION GAP SERPL CALCULATED.3IONS-SCNC: 17 MMOL/L (ref 7–19)
AST SERPL-CCNC: 14 U/L (ref 5–32)
BASOPHILS ABSOLUTE: 0.1 K/UL (ref 0–0.2)
BASOPHILS RELATIVE PERCENT: 0.8 % (ref 0–1)
BILIRUB SERPL-MCNC: 0.4 MG/DL (ref 0.2–1.2)
BUN BLDV-MCNC: 8 MG/DL (ref 6–20)
CALCIUM SERPL-MCNC: 9.2 MG/DL (ref 8.6–10)
CHLORIDE BLD-SCNC: 100 MMOL/L (ref 98–111)
CHOLESTEROL, FASTING: 251 MG/DL (ref 160–199)
CO2: 23 MMOL/L (ref 22–29)
CREAT SERPL-MCNC: 0.8 MG/DL (ref 0.5–0.9)
EOSINOPHILS ABSOLUTE: 0.1 K/UL (ref 0–0.6)
EOSINOPHILS RELATIVE PERCENT: 1.2 % (ref 0–5)
GFR AFRICAN AMERICAN: >59
GFR NON-AFRICAN AMERICAN: >60
GLUCOSE BLD-MCNC: 78 MG/DL (ref 74–109)
HCT VFR BLD CALC: 47.8 % (ref 37–47)
HDLC SERPL-MCNC: 60 MG/DL (ref 65–121)
HEMOGLOBIN: 14.5 G/DL (ref 12–16)
IMMATURE GRANULOCYTES #: 0 K/UL
LDL CHOLESTEROL CALCULATED: 161 MG/DL
LYMPHOCYTES ABSOLUTE: 2 K/UL (ref 1.1–4.5)
LYMPHOCYTES RELATIVE PERCENT: 22.8 % (ref 20–40)
MCH RBC QN AUTO: 31 PG (ref 27–31)
MCHC RBC AUTO-ENTMCNC: 30.3 G/DL (ref 33–37)
MCV RBC AUTO: 102.1 FL (ref 81–99)
MONOCYTES ABSOLUTE: 0.7 K/UL (ref 0–0.9)
MONOCYTES RELATIVE PERCENT: 8.1 % (ref 0–10)
NEUTROPHILS ABSOLUTE: 6 K/UL (ref 1.5–7.5)
NEUTROPHILS RELATIVE PERCENT: 66.9 % (ref 50–65)
PDW BLD-RTO: 13.4 % (ref 11.5–14.5)
PLATELET # BLD: 294 K/UL (ref 130–400)
PMV BLD AUTO: 9.9 FL (ref 9.4–12.3)
POTASSIUM SERPL-SCNC: 4.5 MMOL/L (ref 3.5–5)
RBC # BLD: 4.68 M/UL (ref 4.2–5.4)
SODIUM BLD-SCNC: 140 MMOL/L (ref 136–145)
TOTAL PROTEIN: 7.2 G/DL (ref 6.6–8.7)
TRIGLYCERIDE, FASTING: 150 MG/DL (ref 0–149)
WBC # BLD: 8.9 K/UL (ref 4.8–10.8)

## 2022-03-24 PROCEDURE — 90714 TD VACC NO PRESV 7 YRS+ IM: CPT | Performed by: NURSE PRACTITIONER

## 2022-03-24 PROCEDURE — G8482 FLU IMMUNIZE ORDER/ADMIN: HCPCS | Performed by: NURSE PRACTITIONER

## 2022-03-24 PROCEDURE — 90471 IMMUNIZATION ADMIN: CPT | Performed by: NURSE PRACTITIONER

## 2022-03-24 PROCEDURE — G8419 CALC BMI OUT NRM PARAM NOF/U: HCPCS | Performed by: NURSE PRACTITIONER

## 2022-03-24 PROCEDURE — 4004F PT TOBACCO SCREEN RCVD TLK: CPT | Performed by: NURSE PRACTITIONER

## 2022-03-24 PROCEDURE — 3023F SPIROM DOC REV: CPT | Performed by: NURSE PRACTITIONER

## 2022-03-24 PROCEDURE — 99214 OFFICE O/P EST MOD 30 MIN: CPT | Performed by: NURSE PRACTITIONER

## 2022-03-24 PROCEDURE — G8427 DOCREV CUR MEDS BY ELIG CLIN: HCPCS | Performed by: NURSE PRACTITIONER

## 2022-03-24 RX ORDER — CHLORHEXIDINE GLUCONATE 4 G/100ML
SOLUTION TOPICAL
Qty: 236 ML | Refills: 0 | Status: SHIPPED | OUTPATIENT
Start: 2022-03-24 | End: 2022-04-07

## 2022-03-24 RX ORDER — ALBUTEROL SULFATE 90 UG/1
2 AEROSOL, METERED RESPIRATORY (INHALATION) 4 TIMES DAILY PRN
Qty: 1 EACH | Refills: 5 | Status: SHIPPED | OUTPATIENT
Start: 2022-03-24

## 2022-03-24 ASSESSMENT — ENCOUNTER SYMPTOMS
ABDOMINAL PAIN: 0
WHEEZING: 1
COUGH: 1
CONSTIPATION: 0
SHORTNESS OF BREATH: 1
TROUBLE SWALLOWING: 0

## 2022-03-24 NOTE — PROGRESS NOTES
THREE TIMES DAILY AS NEEDED FOR HIP AND BACK PAIN Yes MELIDA Stauffer   doxepin (SINEQUAN) 25 MG capsule TAKE ONE CAPSULE BY MOUTH EVERY NIGHT AT BEDTIME. Yes MELIDA Stauffer   busPIRone (BUSPAR) 5 MG tablet TAKE ONE TABLET BY MOUTH THREE TIMES DAILY. Yes MELIDA Stauffer   albuterol (ACCUNEB) 1.25 MG/3ML nebulizer solution Inhale 3 mLs into the lungs every 6 hours as needed for Wheezing Yes MELIDA Stauffer   cilostazol (PLETAL) 100 MG tablet Take 1 tablet by mouth 2 times daily Yes MELIDA Horn   traZODone (DESYREL) 100 MG tablet Take 2 tablets by mouth nightly as needed for Sleep Yes MELIDA Stauffer   busPIRone (BUSPAR) 15 MG tablet Take 15 mg by mouth 3 times daily Yes MELIDA Stauffer   hydrOXYzine (VISTARIL) 25 MG capsule Take 1 capsule by mouth 3 times daily as needed for Anxiety Yes MELIDA Stauffer   ibuprofen (ADVIL;MOTRIN) 600 MG tablet Take 1 tablet by mouth 3 times daily as needed for Pain Yes MELIDA Stauffer   furosemide (LASIX) 20 MG tablet Take 1 tablet by mouth 2 times daily Yes MELIDA Stauffer   losartan (COZAAR) 50 MG tablet Take 1/2 (one-half) tablet by mouth once daily Yes MELIDA Stauffer   lidocaine (LIDODERM) 5 % Place 1 patch onto the skin daily 12 hours on, 12 hours off.  Yes MELIDA Stauffer   FLUoxetine (PROZAC) 40 MG capsule Take 1 capsule by mouth daily Yes MELIDA Stauffer   Cranberry 125 MG TABS Take by mouth Yes Historical Provider, MD   aspirin 81 MG EC tablet Take 81 mg by mouth daily Yes Historical Provider, MD   atorvastatin (LIPITOR) 40 MG tablet Take 40 mg by mouth daily Yes Historical Provider, MD   vitamin D3 (CHOLECALCIFEROL) 10 MCG (400 UNIT) TABS tablet Take 400 Units by mouth daily Yes Historical Provider, MD   vitamin B-12 (CYANOCOBALAMIN) 1000 MCG tablet Take 1,000 mcg by mouth daily Yes Historical Provider, MD     Allergies   Allergen Reactions    Ceclor [Cefaclor] Swelling    Lortab [Hydrocodone-Acetaminophen]     Penicillins Hives    Sulfa Antibiotics     Nabumetone Nausea And Vomiting       Review of Systems   Constitutional: Negative for fatigue and fever. HENT: Negative for congestion and trouble swallowing. Respiratory: Positive for cough, shortness of breath and wheezing. Cardiovascular: Negative for chest pain and leg swelling. Gastrointestinal: Negative for abdominal pain and constipation. Genitourinary: Negative for difficulty urinating and frequency. Musculoskeletal: Positive for myalgias. Negative for arthralgias. Skin: Positive for wound. Negative for rash. Neurological: Negative for dizziness and headaches. Psychiatric/Behavioral: Positive for dysphoric mood. The patient is nervous/anxious. OBJECTIVE:    Physical Exam  Constitutional:       Appearance: Normal appearance. She is well-developed and well-groomed. HENT:      Head: Normocephalic and atraumatic. Right Ear: Tympanic membrane, ear canal and external ear normal. There is no impacted cerumen. Left Ear: Tympanic membrane, ear canal and external ear normal. There is no impacted cerumen. Nose: Nose normal.      Mouth/Throat:      Lips: Pink. Mouth: Mucous membranes are moist.      Dentition: Normal dentition. Pharynx: Oropharynx is clear. Uvula midline. Eyes:      General: Lids are normal.         Right eye: No discharge. Left eye: No discharge. Extraocular Movements: Extraocular movements intact. Conjunctiva/sclera: Conjunctivae normal.      Right eye: Right conjunctiva is not injected. Left eye: Left conjunctiva is not injected. Pupils: Pupils are equal, round, and reactive to light. Neck:      Thyroid: No thyromegaly. Vascular: No carotid bruit or JVD. Cardiovascular:      Rate and Rhythm: Normal rate and regular rhythm. Pulses: Normal pulses. Carotid pulses are 2+ on the right side and 2+ on the left side. Radial pulses are 2+ on the right side and 2+ on the left side. Heart sounds: Normal heart sounds, S1 normal and S2 normal. No murmur heard. Pulmonary:      Effort: Pulmonary effort is normal.      Breath sounds: Wheezing present. Chest:   Breasts:      Right: No supraclavicular adenopathy. Left: No supraclavicular adenopathy. Abdominal:      General: Bowel sounds are normal. There is no distension or abdominal bruit. Palpations: Abdomen is soft. There is no mass. Hernia: No hernia is present. Musculoskeletal:         General: Normal range of motion. Cervical back: Full passive range of motion without pain, normal range of motion and neck supple. Right lower leg: No edema. Left lower leg: No edema. Lymphadenopathy:      Cervical: No cervical adenopathy. Right cervical: No superficial cervical adenopathy. Left cervical: No superficial cervical adenopathy. Upper Body:      Right upper body: No supraclavicular adenopathy. Left upper body: No supraclavicular adenopathy. Skin:     General: Skin is warm and dry. Coloration: Skin is not pale. Findings: No lesion or rash. Nails: There is no clubbing. Neurological:      Mental Status: She is alert and oriented to person, place, and time. Motor: No weakness or tremor. Coordination: Coordination normal.      Deep Tendon Reflexes: Reflexes are normal and symmetric. Psychiatric:         Attention and Perception: Attention normal.         Mood and Affect: Mood normal.         Speech: Speech normal.         Behavior: Behavior normal. Behavior is cooperative. Thought Content:  Thought content normal.         Cognition and Memory: Cognition and memory normal.         Judgment: Judgment normal.        /78 (Site: Left Upper Arm, Position: Sitting, Cuff Size: Medium Adult)   Pulse 72   Temp 97.2 °F (36.2 °C) (Temporal)   Resp 20   Wt 146 lb (66.2 kg)   SpO2 96% BMI 25.86 kg/m²      ASSESSMENT:      ICD-10-CM    1. Chronic obstructive pulmonary disease with acute exacerbation (HCC)  J44.1 Pulse oximetry, overnight     fluticasone-salmeterol (ADVAIR HFA) 230-21 MCG/ACT inhaler     albuterol sulfate HFA (VENTOLIN HFA) 108 (90 Base) MCG/ACT inhaler   2. Chronic diastolic congestive heart failure (HCC)  I50.32 CBC with Auto Differential     Comprehensive Metabolic Panel   3. Moderate episode of recurrent major depressive disorder (HCC)  F33.1    4. Lymph[facility] of [facility]abdominal lymph nodes, [facility]  C85.93    5. Skin change  R23.9 chlorhexidine (HIBICLENS) 4 % external liquid   6. Scratch lyudmila  T14. 8XXA Td (adult), 2 Lf Tetanus Toxoid, PF (Td, absorbed)   7. Dyslipidemia  E78.5 Lipid, Fasting   8. Cat scratch  W55. 03XA    9. Laceration of multiple sites of left upper extremity, initial encounter  S41.112A Td (adult), 2 Lf Tetanus Toxoid, PF (Td, absorbed)       PLAN:    Nae Meyers: Breathing Problem (has been having some wheezing and gurgling feels like she is drowning ), Lower Back Pain (is getting pain pump put in April 1st ), and Congestive Heart Failure (has appointment with her heart Dr coming up )    Review labs   Diagnosis and orders for thisvisit are above. All patient questions answered. Pt voiced understanding. Reviewedhealth maintenance. Instructed to continue current medications, diet and exercise. Patient agreed with treatment plan. Follow up as directed.

## 2022-03-31 ENCOUNTER — TELEPHONE (OUTPATIENT)
Dept: VASCULAR SURGERY | Age: 47
End: 2022-03-31

## 2022-03-31 DIAGNOSIS — D53.9 MACROCYTIC ANEMIA: Primary | ICD-10-CM

## 2022-03-31 DIAGNOSIS — D53.9 MACROCYTIC ANEMIA: ICD-10-CM

## 2022-03-31 LAB
FERRITIN: 65.5 NG/ML (ref 13–150)
FOLATE: 4.5 NG/ML (ref 4.8–37.3)
IRON SATURATION: 15 % (ref 14–50)
IRON: 51 UG/DL (ref 37–145)
TOTAL IRON BINDING CAPACITY: 346 UG/DL (ref 250–400)
VITAMIN B-12: 791 PG/ML (ref 211–946)

## 2022-03-31 RX ORDER — ATORVASTATIN CALCIUM 80 MG/1
80 TABLET, FILM COATED ORAL DAILY
Qty: 90 TABLET | Refills: 1 | Status: SHIPPED | OUTPATIENT
Start: 2022-03-31 | End: 2022-06-30

## 2022-04-01 DIAGNOSIS — J44.1 CHRONIC OBSTRUCTIVE PULMONARY DISEASE WITH ACUTE EXACERBATION (HCC): ICD-10-CM

## 2022-04-01 DIAGNOSIS — F41.9 ANXIETY: ICD-10-CM

## 2022-04-04 DIAGNOSIS — E53.8 FOLIC ACID DEFICIENCY: Primary | ICD-10-CM

## 2022-04-04 RX ORDER — HYDROXYZINE PAMOATE 25 MG/1
CAPSULE ORAL
Qty: 60 CAPSULE | Refills: 5 | Status: SHIPPED | OUTPATIENT
Start: 2022-04-04 | End: 2022-09-15

## 2022-04-04 RX ORDER — LANOLIN ALCOHOL/MO/W.PET/CERES
400 CREAM (GRAM) TOPICAL DAILY
Qty: 30 TABLET | Refills: 5 | Status: SHIPPED | OUTPATIENT
Start: 2022-04-04

## 2022-04-25 ENCOUNTER — TELEPHONE (OUTPATIENT)
Dept: FAMILY MEDICINE CLINIC | Age: 47
End: 2022-04-25

## 2022-04-26 ENCOUNTER — OFFICE VISIT (OUTPATIENT)
Dept: FAMILY MEDICINE CLINIC | Age: 47
End: 2022-04-26
Payer: MEDICARE

## 2022-04-26 ENCOUNTER — HOSPITAL ENCOUNTER (OUTPATIENT)
Dept: GENERAL RADIOLOGY | Age: 47
Discharge: HOME | End: 2022-04-26
Payer: MEDICARE

## 2022-04-26 VITALS
DIASTOLIC BLOOD PRESSURE: 60 MMHG | OXYGEN SATURATION: 97 % | HEART RATE: 52 BPM | BODY MASS INDEX: 25.33 KG/M2 | WEIGHT: 143 LBS | SYSTOLIC BLOOD PRESSURE: 100 MMHG | TEMPERATURE: 97.4 F

## 2022-04-26 DIAGNOSIS — R06.02 WAKING AT NIGHT SHORT OF BREATH: Primary | ICD-10-CM

## 2022-04-26 DIAGNOSIS — R06.02 WAKING AT NIGHT SHORT OF BREATH: ICD-10-CM

## 2022-04-26 DIAGNOSIS — J44.9 SEVERE CHRONIC OBSTRUCTIVE PULMONARY DISEASE (HCC): ICD-10-CM

## 2022-04-26 DIAGNOSIS — J45.41 MODERATE PERSISTENT ASTHMA WITH ACUTE EXACERBATION: ICD-10-CM

## 2022-04-26 PROCEDURE — 4004F PT TOBACCO SCREEN RCVD TLK: CPT | Performed by: NURSE PRACTITIONER

## 2022-04-26 PROCEDURE — 99213 OFFICE O/P EST LOW 20 MIN: CPT | Performed by: NURSE PRACTITIONER

## 2022-04-26 PROCEDURE — G8427 DOCREV CUR MEDS BY ELIG CLIN: HCPCS | Performed by: NURSE PRACTITIONER

## 2022-04-26 PROCEDURE — 71046 X-RAY EXAM CHEST 2 VIEWS: CPT

## 2022-04-26 PROCEDURE — 3023F SPIROM DOC REV: CPT | Performed by: NURSE PRACTITIONER

## 2022-04-26 PROCEDURE — G8419 CALC BMI OUT NRM PARAM NOF/U: HCPCS | Performed by: NURSE PRACTITIONER

## 2022-04-26 RX ORDER — ALBUTEROL SULFATE 1.25 MG/3ML
1 SOLUTION RESPIRATORY (INHALATION) EVERY 6 HOURS PRN
Qty: 360 ML | Refills: 3 | Status: SHIPPED | OUTPATIENT
Start: 2022-04-26 | End: 2022-09-15 | Stop reason: SDUPTHER

## 2022-04-26 RX ORDER — ALBUTEROL SULFATE 90 UG/1
2 AEROSOL, METERED RESPIRATORY (INHALATION) EVERY 6 HOURS PRN
Qty: 18 G | Refills: 5 | Status: SHIPPED | OUTPATIENT
Start: 2022-04-26 | End: 2022-09-15 | Stop reason: SDUPTHER

## 2022-04-26 ASSESSMENT — ENCOUNTER SYMPTOMS
CHEST TIGHTNESS: 1
WHEEZING: 1
COUGH: 1
SHORTNESS OF BREATH: 1

## 2022-04-26 NOTE — PROGRESS NOTES
SUBJECTIVE:  Breathing problem   Patient ID: Kendra Marsh is a 51 y.o. female. HPI:   HPI     Left lung with breathing in or breathing out it is painful. Tried steroids in the past. With Pleurisy     States Savilla Morning is not helping and also the albuterol is blue container not helping wanting to go to the Advair HFA  States she does better with oral steroids   Last PFT in 2020 showing severe obstruction. Continues to smoke has tried to decrease to 5-6 cig a day but started at 14years of age and 1 1/2 pks ant the most and mainly a pk a day. Past Medical History:   Diagnosis Date    Allergic rhinitis     Cancer ([facility]     cervical, lymphoma    Congenital heart disease     Mesenteric artery stenosis (HCC)     MRSA infection     Stroke [facility])     after heart surgery 2013      Prior to Visit Medications    Medication Sig Taking?  Authorizing Provider   fluticasone-salmeterol (ADVAIR HFA) 115-21 MCG/ACT inhaler Inhale 2 puffs into the lungs 2 times daily Yes MELIDA Vasquez   albuterol sulfate HFA (PROAIR HFA) 108 (90 Base) MCG/ACT inhaler Inhale 2 puffs into the lungs every 6 hours as needed for Wheezing Yes MELIDA Vasquez   albuterol (ACCUNEB) 1.25 MG/3ML nebulizer solution Inhale 3 mLs into the lungs every 6 hours as needed for Wheezing Yes MELIDA Vasquez   hydrOXYzine (VISTARIL) 25 MG capsule TAKE ONE CAPSULE BY MOUTH THREE TIMES DAILY AS NEEDED FOR ANXIETY ** MAY CAUSE DROWSINESS ** Yes MELIDA Vasquez   folic acid (FOLATE) 515 MCG tablet Take 1 tablet by mouth daily Yes MELIDA Vasquez   atorvastatin (LIPITOR) 80 MG tablet Take 1 tablet by mouth daily Take 40 mg by mouth daily Yes MELIDA Vasquez   albuterol sulfate HFA (VENTOLIN HFA) 108 (90 Base) MCG/ACT inhaler Inhale 2 puffs into the lungs 4 times daily as needed for Wheezing Yes MELIDA Vasquez   tiZANidine (ZANAFLEX) 4 MG tablet TAKE ONE TABLET BY MOUTH THREE TIMES DAILY AS NEEDED FOR HIP AND BACK PAIN Yes Linde Rubinstein, APRN   doxepin (SINEQUAN) 25 MG capsule TAKE ONE CAPSULE BY MOUTH EVERY NIGHT AT BEDTIME. Yes Linde Rubinstein, APRN   busPIRone (BUSPAR) 5 MG tablet TAKE ONE TABLET BY MOUTH THREE TIMES DAILY. Yes Linde Rubinstein, APRN   cilostazol (PLETAL) 100 MG tablet Take 1 tablet by mouth 2 times daily Yes MELIDA Pablo   traZODone (DESYREL) 100 MG tablet Take 2 tablets by mouth nightly as needed for Sleep Yes Linde Rubinstein, APRN   ibuprofen (ADVIL;MOTRIN) 600 MG tablet Take 1 tablet by mouth 3 times daily as needed for Pain Yes Linde Rubinstein, APRN   furosemide (LASIX) 20 MG tablet Take 1 tablet by mouth 2 times daily Yes Linde Rubinstein, APRN   losartan (COZAAR) 50 MG tablet Take 1/2 (one-half) tablet by mouth once daily Yes Linde Rubinstein, APRN   FLUoxetine (PROZAC) 40 MG capsule Take 1 capsule by mouth daily Yes Linde Rubinstein, APRN   aspirin 81 MG EC tablet Take 81 mg by mouth daily Yes Historical Provider, MD   vitamin D3 (CHOLECALCIFEROL) 10 MCG (400 UNIT) TABS tablet Take 400 Units by mouth daily Yes Historical Provider, MD   vitamin B-12 (CYANOCOBALAMIN) 1000 MCG tablet Take 1,000 mcg by mouth daily Yes Historical Provider, MD   fluticasone-salmeterol (ADVAIR HFA) 230-21 MCG/ACT inhaler Inhale 2 puffs into the lungs 2 times daily  Patient not taking: Reported on 4/26/2022  Linde Rubinstein, APRN   lidocaine (LIDODERM) 5 % Place 1 patch onto the skin daily 12 hours on, 12 hours off. Patient not taking: Reported on 4/26/2022  Linde Rubinstein, APRN   Cranberry 125 MG TABS Take by mouth  Patient not taking: Reported on 4/26/2022  Historical Provider, MD      Allergies   Allergen Reactions    Ceclor [Cefaclor] Swelling    Lortab [Hydrocodone-Acetaminophen]     Penicillins Hives    Sulfa Antibiotics     Nabumetone Nausea And Vomiting       Review of Systems   Constitutional: Negative for fever.    Respiratory: Positive for cough, chest tightness, shortness of breath and wheezing. Psychiatric/Behavioral: Positive for sleep disturbance. The patient is nervous/anxious. OBJECTIVE:    Physical Exam  Constitutional:       Appearance: Normal appearance. She is well-developed. She is not ill-appearing. HENT:      Head: Normocephalic and atraumatic. Right Ear: External ear normal.      Left Ear: External ear normal.      Nose: Nose normal.      Mouth/Throat:      Lips: Pink. Mouth: Mucous membranes are moist.   Eyes:      General: Lids are normal.      Extraocular Movements: Extraocular movements intact. Conjunctiva/sclera: Conjunctivae normal.   Cardiovascular:      Rate and Rhythm: Normal rate and regular rhythm. Heart sounds: Normal heart sounds. No murmur heard. Pulmonary:      Effort: Pulmonary effort is normal.      Breath sounds: Decreased breath sounds and wheezing present. Musculoskeletal:      Cervical back: Normal range of motion. Skin:     General: Skin is warm and dry. Neurological:      Mental Status: She is alert and oriented to person, place, and time. Motor: No weakness or tremor. Coordination: Coordination is intact. Psychiatric:         Attention and Perception: Attention and perception normal.         Mood and Affect: Mood normal.         Speech: Speech normal.         Behavior: Behavior normal. Behavior is cooperative. Thought Content: Thought content normal.         Cognition and Memory: Cognition and memory normal.         Judgment: Judgment normal.        /60 (Site: Left Upper Arm, Position: Sitting, Cuff Size: Medium Adult)   Pulse 52   Temp 97.4 °F (36.3 °C) (Temporal)   Wt 143 lb (64.9 kg)   SpO2 97%   BMI 25.33 kg/m²      ASSESSMENT:      ICD-10-CM    1. Waking at night short of breath  R06.02 Pulse oximetry, overnight     XR CHEST STANDARD (2 VW)     Noam Huerta MD, Pulmonary Disease, [facility]   2.  Moderate persistent asthma with acute exacerbation J45.41 fluticasone-salmeterol (ADVAIR HFA) 115-21 MCG/ACT inhaler     albuterol sulfate HFA (PROAIR HFA) 108 (90 Base) MCG/ACT inhaler     albuterol (ACCUNEB) 1.25 MG/3ML nebulizer solution     XR CHEST STANDARD (2 VW)     Av. Darrin Huerta MD, Pulmonary Disease, [facility]   3. Severe chronic obstructive pulmonary disease [facility])  J44.9 AvJeannette Huerta MD, Pulmonary Disease, [place] [facility]       PLAN:    Miroslava Escudero [place]: COPD (left lung is bothering her, feels pain when she breaths in and out)  has been referred for overnight pulse ox. 3 times    REfer to Pulmonolgy but COVID in Oct 2020      Diagnosis and orders for this visit are above.

## 2022-05-09 DIAGNOSIS — F33.1 MODERATE EPISODE OF RECURRENT MAJOR DEPRESSIVE DISORDER (HCC): ICD-10-CM

## 2022-05-09 RX ORDER — FLUOXETINE HYDROCHLORIDE 40 MG/1
CAPSULE ORAL
Qty: 30 CAPSULE | Refills: 3 | Status: SHIPPED | OUTPATIENT
Start: 2022-05-09 | End: 2022-09-15 | Stop reason: SDUPTHER

## 2022-05-10 ENCOUNTER — OFFICE VISIT (OUTPATIENT)
Dept: FAMILY MEDICINE CLINIC | Age: 47
End: 2022-05-10
Payer: MEDICARE

## 2022-05-10 ENCOUNTER — TELEPHONE (OUTPATIENT)
Dept: PSYCHIATRY | Age: 47
End: 2022-05-10

## 2022-05-10 ENCOUNTER — OFFICE VISIT (OUTPATIENT)
Dept: PSYCHIATRY | Age: 47
End: 2022-05-10
Payer: MEDICAID

## 2022-05-10 VITALS
OXYGEN SATURATION: 97 % | BODY MASS INDEX: 25.87 KG/M2 | HEART RATE: 72 BPM | HEIGHT: 63 IN | SYSTOLIC BLOOD PRESSURE: 122 MMHG | DIASTOLIC BLOOD PRESSURE: 80 MMHG | WEIGHT: 146 LBS | TEMPERATURE: 98.4 F

## 2022-05-10 DIAGNOSIS — F33.1 MODERATE EPISODE OF RECURRENT MAJOR DEPRESSIVE DISORDER (HCC): ICD-10-CM

## 2022-05-10 DIAGNOSIS — F41.1 GAD (GENERALIZED ANXIETY DISORDER): ICD-10-CM

## 2022-05-10 DIAGNOSIS — F43.0 STRESS REACTION: ICD-10-CM

## 2022-05-10 DIAGNOSIS — F33.1 MDD (MAJOR DEPRESSIVE DISORDER), RECURRENT EPISODE, MODERATE (HCC): Primary | ICD-10-CM

## 2022-05-10 DIAGNOSIS — I50.32 CHRONIC DIASTOLIC CONGESTIVE HEART FAILURE (HCC): Primary | ICD-10-CM

## 2022-05-10 DIAGNOSIS — F41.1 GENERALIZED ANXIETY DISORDER: Primary | ICD-10-CM

## 2022-05-10 PROCEDURE — G8427 DOCREV CUR MEDS BY ELIG CLIN: HCPCS | Performed by: NURSE PRACTITIONER

## 2022-05-10 PROCEDURE — 99213 OFFICE O/P EST LOW 20 MIN: CPT | Performed by: NURSE PRACTITIONER

## 2022-05-10 PROCEDURE — 90791 PSYCH DIAGNOSTIC EVALUATION: CPT | Performed by: SOCIAL WORKER

## 2022-05-10 PROCEDURE — G8419 CALC BMI OUT NRM PARAM NOF/U: HCPCS | Performed by: NURSE PRACTITIONER

## 2022-05-10 PROCEDURE — 4004F PT TOBACCO SCREEN RCVD TLK: CPT | Performed by: NURSE PRACTITIONER

## 2022-05-10 PROCEDURE — 4004F PT TOBACCO SCREEN RCVD TLK: CPT | Performed by: SOCIAL WORKER

## 2022-05-10 ASSESSMENT — ANXIETY QUESTIONNAIRES
7. FEELING AFRAID AS IF SOMETHING AWFUL MIGHT HAPPEN: 2-OVER HALF THE DAYS
2. NOT BEING ABLE TO STOP OR CONTROL WORRYING: 3-NEARLY EVERY DAY
3. WORRYING TOO MUCH ABOUT DIFFERENT THINGS: 3-NEARLY EVERY DAY
4. TROUBLE RELAXING: 3-NEARLY EVERY DAY
6. BECOMING EASILY ANNOYED OR IRRITABLE: 1-SEVERAL DAYS
GAD7 TOTAL SCORE: 14
1. FEELING NERVOUS, ANXIOUS, OR ON EDGE: 1
5. BEING SO RESTLESS THAT IT IS HARD TO SIT STILL: 1-SEVERAL DAYS

## 2022-05-10 ASSESSMENT — PATIENT HEALTH QUESTIONNAIRE - PHQ9
10. IF YOU CHECKED OFF ANY PROBLEMS, HOW DIFFICULT HAVE THESE PROBLEMS MADE IT FOR YOU TO DO YOUR WORK, TAKE CARE OF THINGS AT HOME, OR GET ALONG WITH OTHER PEOPLE: 1
9. THOUGHTS THAT YOU WOULD BE BETTER OFF DEAD, OR OF HURTING YOURSELF: 0
2. FEELING DOWN, DEPRESSED OR HOPELESS: 2
4. FEELING TIRED OR HAVING LITTLE ENERGY: 1
7. TROUBLE CONCENTRATING ON THINGS, SUCH AS READING THE NEWSPAPER OR WATCHING TELEVISION: 1
1. LITTLE INTEREST OR PLEASURE IN DOING THINGS: 2
SUM OF ALL RESPONSES TO PHQ QUESTIONS 1-9: 7
SUM OF ALL RESPONSES TO PHQ9 QUESTIONS 1 & 2: 4
3. TROUBLE FALLING OR STAYING ASLEEP: 1
8. MOVING OR SPEAKING SO SLOWLY THAT OTHER PEOPLE COULD HAVE NOTICED. OR THE OPPOSITE, BEING SO FIGETY OR RESTLESS THAT YOU HAVE BEEN MOVING AROUND A LOT MORE THAN USUAL: 0
6. FEELING BAD ABOUT YOURSELF - OR THAT YOU ARE A FAILURE OR HAVE LET YOURSELF OR YOUR FAMILY DOWN: 0
SUM OF ALL RESPONSES TO PHQ QUESTIONS 1-9: 7
SUM OF ALL RESPONSES TO PHQ QUESTIONS 1-9: 7
5. POOR APPETITE OR OVEREATING: 0
SUM OF ALL RESPONSES TO PHQ QUESTIONS 1-9: 7

## 2022-05-10 ASSESSMENT — ENCOUNTER SYMPTOMS
COUGH: 1
SHORTNESS OF BREATH: 0

## 2022-05-10 NOTE — PATIENT INSTRUCTIONS
Recommendations to patient:      1. Practice new coping, stress management, relaxation skills at least                   two times a day for at least 10-30 minutes. 2. Find at least one positive outlet per day that makes you feel better. 3. Talk things over with a good friend. Practice letting things go. 4. Stop, breathe, reset. \"I am ok. \"     Scheduled follow up appointment. Call for a sooner appointment if needed or if you need to change or cancel you appointment. Melody Carmen, [phone] and choose the option for behavioral health  You can also send her a message on My Chart. Be aware that all my messages are linked to her. If you receive a survey after visiting one of our offices, please take time to share your experience about your office visit. These surveys are confidential and no health information about you is shared. We highly welcome your feedback to continuously improve our services for you. When something really bad happens, everything changes. The way we feel, the way we think, the way we deal with things. We feel anxious, on edge, frustrated, upset, sad, defensive, cornered. We question everything. The concepts of how the world is supposed to be, are shaken. There will be ups and downs, and they can be intense. Those moments pass, we can learn to cope with them  Goal of healing is to put the inner world back in order so that we can deal with the outer world in a healthy, safe, secure way. When to say Yes  How to say No  To take control of your life      Boundaries Text Dorinda uCevas      Excessive emotional or psychological reliance on a partner, typically one who requires support on account of an illness or addiction. Codependency is a behavioral condition in a relationship where one person enables another person's addiction, poor mental health, immaturity, irresponsibility, or under-achievement.  Among the core characteristics of codependency is an excessive reliance on other people for approval and a sense of identity. Sponsored       Courage to Cure Codependency: Healthy Detachment Strategies to Overcome Jealousy in Relationships, Stop Controlling Others, Boost Your Self Esteem, and Be Codependent No More   by Liliana Arvizu  Nov 5, 2018   Paperback   $14.99         STRESS MANAGEMENT STRATEGIES    1. Recognize Stress:  Learning to recognize when your body is reacting to stress and identifying our stressors are the first steps in managing stress. 2. Take a Break:  A change of pace, no matter how short, gives us a new outlook on old problems. Take a vacation 20 minutes a day - enjoy a change from the daily routine. 3. Learn to Relax:  Under stress, the muscles in our bodies stay tight. One of the most effective ways to combat tensions is deep muscle relaxation. Other techniques that produce muscle and mental relaxation are yoga, prayer, and deep breathing. 4. Be Nutritionally Aware:  Good nutrition is vital to optimum health, and is especially critical when we are under unusual stress, or going through a major life change. 5. Exercise Regularly:  Just like nutrition, exercise is imperative for maintaining good fitness. Whatever you enjoy - swimming, walking, jogging, aerobic exercise - will help you let off steam and work out stress. 6. Plan your Work:  Tension and anxiety really build up when our work seems endless. Plan your work to use time and energy more efficiently. Take one thing at a time. 7. Talk it Over: This may be the most important thing you can do for yourself if you cant get a handle on things. Find a good listener. Just as a pressure relief valve allows steam to flow out of a pressure cooker and keeps it from blowing up, so talking allows stress to flow out of the body and keeps us from blowing up.     8. Accept What You Cannot Change:  If the problem is beyond your control at this time, try your best to accept it until you can change it. It beats spinning your wheels and getting nowhere. 9. Evaluate Your Perceptions:  What we think is sometimes what we feel. If we constantly think unrealistic or alarming thoughts about ourselves or other folks, then our stress level is increased. 10. Relax Unrealistic Standards:  When we set unrealistic standards for ourselves, we usually can never reach them. If we do, we burn out quickly. Set reasonable goals and standards. 11. Reward Yourself:  Find ways to reward yourself when youve completed a minor or major task. We cannot always depend on others to recognize us, so we must develop our own reward system. 12. Become Assertive: Take steps to solve problems instead of feeling helpless. Distinguishing assertiveness (respecting others rights and your rights) from aggressiveness and passivity can do much to resolve internal stress. 13. Rediscover Humor:  Learn to laugh at yourself and your situation! 14. Increase Pleasurable Activities:  Take time to participate in fun, pleasurable, activities on a regular basis. \"calm\"    [email or web address]    Mood tracker    Relax    Calendar 31     Stop, Breathe, and Think    headspace    Stop panic     Meditation studio    [employer]    iMoodJournal ($1.99)    Time Tune
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
## 2022-05-10 NOTE — PROGRESS NOTES
SUBJECTIVE:  eval of the Oxygen. Patient ID: Emma Martinez is a 51 y.o. female. HPI:   HPI   Review CXR April 26th of 2022. Will be seeing the Cardiologist in June. Tobacco has cut to 7-10 a day. If short of breath decrease to 4 cigarette. Comes in today it did identified that she needed oxygen at night when she went to sleep which we are going to order the O2 2 L at night however at the exercise test did not show that she needed the portable oxygen during activity  Past Medical History:   Diagnosis Date    Allergic rhinitis     Cancer ([facility]     cervical, lymphoma    Congenital heart disease     Mesenteric artery stenosis ([facility]     MRSA infection     Stroke [facility])     after heart surgery 2013      Prior to Visit Medications    Medication Sig Taking? Authorizing Provider   FLUoxetine (PROZAC) 40 MG capsule TAKE ONE CAPSULE BY MOUTH DAILY.  Yes MELIDA Werner   fluticasone-salmeterol (ADVAIR HFA) 594-28 MCG/ACT inhaler Inhale 2 puffs into the lungs 2 times daily Yes MELIDA Werner   albuterol sulfate HFA (PROAIR HFA) 108 (90 Base) MCG/ACT inhaler Inhale 2 puffs into the lungs every 6 hours as needed for Wheezing Yes MELIDA Werner   albuterol (ACCUNEB) 1.25 MG/3ML nebulizer solution Inhale 3 mLs into the lungs every 6 hours as needed for Wheezing Yes MELIDA Werner   hydrOXYzine (VISTARIL) 25 MG capsule TAKE ONE CAPSULE BY MOUTH THREE TIMES DAILY AS NEEDED FOR ANXIETY ** MAY CAUSE DROWSINESS ** Yes MELIDA Werner   folic acid (FOLATE) 240 MCG tablet Take 1 tablet by mouth daily Yes MELIDA Werner   atorvastatin (LIPITOR) 80 MG tablet Take 1 tablet by mouth daily Take 40 mg by mouth daily Yes MELIDA Werner   albuterol sulfate HFA (VENTOLIN HFA) 108 (90 Base) MCG/ACT inhaler Inhale 2 puffs into the lungs 4 times daily as needed for Wheezing Yes MELIDA Werner   tiZANidine (ZANAFLEX) 4 MG tablet TAKE ONE TABLET BY MOUTH THREE TIMES DAILY AS NEEDED FOR HIP AND BACK PAIN Yes MELIDA Mayo   doxepin (SINEQUAN) 25 MG capsule TAKE ONE CAPSULE BY MOUTH EVERY NIGHT AT BEDTIME. Yes MELIDA Mayo   busPIRone (BUSPAR) 5 MG tablet TAKE ONE TABLET BY MOUTH THREE TIMES DAILY. Yes MELIDA Mayo   cilostazol (PLETAL) 100 MG tablet Take 1 tablet by mouth 2 times daily Yes MELIDA Turner   traZODone (DESYREL) 100 MG tablet Take 2 tablets by mouth nightly as needed for Sleep Yes MELIDA Mayo   ibuprofen (ADVIL;MOTRIN) 600 MG tablet Take 1 tablet by mouth 3 times daily as needed for Pain Yes MELIDA Mayo   furosemide (LASIX) 20 MG tablet Take 1 tablet by mouth 2 times daily Yes MELIDA Mayo   losartan (COZAAR) 50 MG tablet Take 1/2 (one-half) tablet by mouth once daily Yes MELIDA Mayo   lidocaine (LIDODERM) 5 % Place 1 patch onto the skin daily 12 hours on, 12 hours off. Yes MELIDA Maoy   aspirin 81 MG EC tablet Take 81 mg by mouth daily Yes Historical Provider, MD   vitamin D3 (CHOLECALCIFEROL) 10 MCG (400 UNIT) TABS tablet Take 400 Units by mouth daily Yes Historical Provider, MD   vitamin B-12 (CYANOCOBALAMIN) 1000 MCG tablet Take 1,000 mcg by mouth daily Yes Historical Provider, MD   Cranberry 125 MG TABS Take by mouth  Patient not taking: Reported on 4/26/2022  Historical Provider, MD      Allergies   Allergen Reactions    Ceclor [Cefaclor] Swelling    Lortab [Hydrocodone-Acetaminophen]     Penicillins Hives    Sulfa Antibiotics     Nabumetone Nausea And Vomiting       Review of Systems   Constitutional: Positive for fatigue. Respiratory: Positive for cough. Negative for shortness of breath. Cardiovascular: Negative for chest pain and leg swelling. OBJECTIVE:    Physical Exam  Constitutional:       Appearance: Normal appearance. She is well-developed. She is not ill-appearing. HENT:      Head: Normocephalic and atraumatic.       Right Ear: External ear normal. Left Ear: External ear normal.      Nose: Nose normal.      Mouth/Throat:      Lips: Pink. Mouth: Mucous membranes are moist.   Eyes:      General: Lids are normal.      Extraocular Movements: Extraocular movements intact. Conjunctiva/sclera: Conjunctivae normal.   Cardiovascular:      Rate and Rhythm: Normal rate and regular rhythm. Heart sounds: Normal heart sounds. No murmur heard. Pulmonary:      Effort: Pulmonary effort is normal.      Breath sounds: Normal breath sounds. Musculoskeletal:      Cervical back: Normal range of motion. Skin:     General: Skin is warm and dry. Neurological:      Mental Status: She is alert and oriented to person, place, and time. Motor: No weakness or tremor. Coordination: Coordination is intact. Psychiatric:         Attention and Perception: Attention and perception normal.         Mood and Affect: Mood normal.         Speech: Speech normal.         Behavior: Behavior normal. Behavior is cooperative. Thought Content: Thought content normal.         Cognition and Memory: Cognition and memory normal.         Judgment: Judgment normal.        /80 (Site: Left Upper Arm, Position: Sitting, Cuff Size: Medium Adult)   Pulse 72   Temp 98.4 °F (36.9 °C) (Temporal)   Ht 5' 3\" (1.6 m)   Wt 146 lb (66.2 kg)   SpO2 97% Comment: on 2 liters oxygen resting in room after 5 minutes  BMI 25.86 kg/m²      ASSESSMENT:      ICD-10-CM    1. Chronic diastolic congestive heart failure (HCC)  I50.32 DME Order for Home Oxygen as OP       PLAN:    Nae Meyers: Shortness of Breath (here to get oxygen testing done. she recently had a overnight Pulse ox and it resulted as oxygen needed. )      Diagnosis and orders for this visit are above.

## 2022-05-10 NOTE — TELEPHONE ENCOUNTER
Left voicemail for return call to schedule first appointment with Brad Conley. Advised to call [phone].

## 2022-05-16 ENCOUNTER — TELEPHONE (OUTPATIENT)
Dept: PRIMARY CARE CLINIC | Age: 47
End: 2022-05-16

## 2022-05-16 NOTE — TELEPHONE ENCOUNTER
Call placed to patient to follow up from phone call on 3/2. Silvino Pique stated that patient will need to continue current refill for March, and then patient is to call April refill and she will increase 7.5 mg strength to QID for the month of April and then patient is to follow up in the office on 5/4/21. Patient did not answer at this time, and no voicemail is set up.
The patient is a 65y Male complaining of abdominal pain.

## 2022-05-16 NOTE — TELEPHONE ENCOUNTER
[facility] has sent you an in basket message on this patients visit with you stating: No diagnosis on documentation. Please review. Please complete and let me know once completed. Thank you.

## 2022-06-07 RX ORDER — FUROSEMIDE 20 MG/1
20 TABLET ORAL 2 TIMES DAILY
Qty: 60 TABLET | Refills: 5 | Status: SHIPPED | OUTPATIENT
Start: 2022-06-07

## 2022-06-29 DIAGNOSIS — G47.00 INSOMNIA, UNSPECIFIED TYPE: ICD-10-CM

## 2022-06-29 DIAGNOSIS — M79.2 NERVE PAIN: ICD-10-CM

## 2022-06-29 RX ORDER — CILOSTAZOL 100 MG/1
100 TABLET ORAL 2 TIMES DAILY
Qty: 60 TABLET | Refills: 3 | Status: SHIPPED | OUTPATIENT
Start: 2022-06-29

## 2022-06-30 RX ORDER — DOXEPIN HYDROCHLORIDE 25 MG/1
CAPSULE ORAL
Qty: 90 CAPSULE | Refills: 0 | Status: SHIPPED | OUTPATIENT
Start: 2022-06-30 | End: 2022-09-15 | Stop reason: ALTCHOICE

## 2022-06-30 RX ORDER — IBUPROFEN 600 MG/1
TABLET ORAL
Qty: 270 TABLET | Refills: 0 | Status: SHIPPED | OUTPATIENT
Start: 2022-06-30

## 2022-06-30 RX ORDER — ATORVASTATIN CALCIUM 80 MG/1
TABLET, FILM COATED ORAL
Qty: 90 TABLET | Refills: 0 | Status: SHIPPED | OUTPATIENT
Start: 2022-06-30 | End: 2022-09-22 | Stop reason: ALTCHOICE

## 2022-07-13 ENCOUNTER — OFFICE VISIT (OUTPATIENT)
Dept: VASCULAR SURGERY | Age: 47
End: 2022-07-13
Payer: MEDICARE

## 2022-07-13 ENCOUNTER — HOSPITAL ENCOUNTER (OUTPATIENT)
Dept: VASCULAR LAB | Age: 47
Discharge: HOME | End: 2022-07-13
Payer: MEDICARE

## 2022-07-13 VITALS
TEMPERATURE: 97.2 F | DIASTOLIC BLOOD PRESSURE: 74 MMHG | RESPIRATION RATE: 18 BRPM | HEART RATE: 79 BPM | SYSTOLIC BLOOD PRESSURE: 147 MMHG | OXYGEN SATURATION: 98 %

## 2022-07-13 DIAGNOSIS — I70.213 ATHEROSCLER OF NATIVE ARTERY OF BOTH LEGS WITH INTERMIT CLAUDICATION (HCC): Primary | ICD-10-CM

## 2022-07-13 DIAGNOSIS — I70.213 ATHEROSCLER OF NATIVE ARTERY OF BOTH LEGS WITH INTERMIT CLAUDICATION (HCC): ICD-10-CM

## 2022-07-13 PROCEDURE — 99214 OFFICE O/P EST MOD 30 MIN: CPT | Performed by: NURSE PRACTITIONER

## 2022-07-13 PROCEDURE — G8427 DOCREV CUR MEDS BY ELIG CLIN: HCPCS | Performed by: NURSE PRACTITIONER

## 2022-07-13 PROCEDURE — G8419 CALC BMI OUT NRM PARAM NOF/U: HCPCS | Performed by: NURSE PRACTITIONER

## 2022-07-13 PROCEDURE — 4004F PT TOBACCO SCREEN RCVD TLK: CPT | Performed by: NURSE PRACTITIONER

## 2022-07-13 PROCEDURE — 93923 UPR/LXTR ART STDY 3+ LVLS: CPT

## 2022-07-14 NOTE — PROGRESS NOTES
Jyoti Choi (:  1975) is a 51 y.o. female,Established patient, here for evaluation of the following chief complaint(s):  Follow-up (jefry)            SUBJECTIVE/OBJECTIVE:  Eric Johnson has a history of peripheral vascular disease of the lower extremities. She has had this for 1 - 5 years. Current treatment includes ASA EC daily. Eric Johnson has not had new wounds. Recently, she reports claudication at a distance of  Which is extended. She is mostly bothered by right leg. She has no wounds. Jyoti Choi is a 51 y.o. female with the following history as recorded in [facility]:  Patient Active Problem List    Diagnosis Date Noted    Chronic diastolic congestive heart failure ([facility] [address] 2022    Moderate episode of recurrent major depressive disorder ([facility] [address] 2022    Lymphoma of intra-abdominal lymph nodes, unspecified lymphoma type ([facility] [address] 2021    Atherosclerosis of native artery of both lower extremities with intermittent claudication ([facility] [address] 2021    Mesenteric artery stenosis (HCC)     Nerve pain 2021    Pain medication agreement 2021    Chest pain with moderate risk for cardiac etiology 2021    Cognitive deficit as late effect of cerebrovascular accident (CVA) 2021    Generalized anxiety disorder 2021    Dyslipidemia 2021    Chronic bilateral low back pain without sciatica 2021     Current Outpatient Medications   Medication Sig Dispense Refill    atorvastatin (LIPITOR) 80 MG tablet TAKE ONE TABLET BY MOUTH DAILY 90 tablet 0    doxepin (SINEQUAN) 25 MG capsule TAKE ONE CAPSULE BY MOUTH EVERY NIGHT AT BEDTIME.  90 capsule 0    ibuprofen (ADVIL;MOTRIN) 600 MG tablet TAKE ONE TABLET BY MOUTH THREE TIMES DAILY AS NEEDED FOR PAIN WITH FOOD ** MAY CAUSE DROWSINESS ** 270 tablet 0    cilostazol (PLETAL) 100 MG tablet TAKE 1 TABLET BY MOUTH 2 TIMES DAILY 60 tablet 3    furosemide (LASIX) 20 MG tablet TAKE 1 TABLET BY MOUTH 2 TIMES DAILY 60 tablet 5    FLUoxetine (PROZAC) 40 MG capsule TAKE ONE CAPSULE BY MOUTH DAILY. 30 capsule 3    fluticasone-salmeterol (ADVAIR HFA) 115-21 MCG/ACT inhaler Inhale 2 puffs into the lungs 2 times daily 1 each 5    albuterol sulfate HFA (PROAIR HFA) 108 (90 Base) MCG/ACT inhaler Inhale 2 puffs into the lungs every 6 hours as needed for Wheezing 18 g 5    albuterol (ACCUNEB) 1.25 MG/3ML nebulizer solution Inhale 3 mLs into the lungs every 6 hours as needed for Wheezing 360 mL 3    hydrOXYzine (VISTARIL) 25 MG capsule TAKE ONE CAPSULE BY MOUTH THREE TIMES DAILY AS NEEDED FOR ANXIETY ** MAY CAUSE DROWSINESS ** 60 capsule 5    folic acid (FOLATE) 512 MCG tablet Take 1 tablet by mouth daily 30 tablet 5    albuterol sulfate HFA (VENTOLIN HFA) 108 (90 Base) MCG/ACT inhaler Inhale 2 puffs into the lungs 4 times daily as needed for Wheezing 1 each 5    tiZANidine (ZANAFLEX) 4 MG tablet TAKE ONE TABLET BY MOUTH THREE TIMES DAILY AS NEEDED FOR HIP AND BACK PAIN 60 tablet 5    busPIRone (BUSPAR) 5 MG tablet TAKE ONE TABLET BY MOUTH THREE TIMES DAILY. 90 tablet 1    traZODone (DESYREL) 100 MG tablet Take 2 tablets by mouth nightly as needed for Sleep 180 tablet 1    losartan (COZAAR) 50 MG tablet Take 1/2 (one-half) tablet by mouth once daily 30 tablet 5    lidocaine (LIDODERM) 5 % Place 1 patch onto the skin daily 12 hours on, 12 hours off. 60 patch 5    Cranberry 125 MG TABS Take by mouth       aspirin 81 MG EC tablet Take 81 mg by mouth daily      vitamin D3 (CHOLECALCIFEROL) 10 MCG (400 UNIT) TABS tablet Take 400 Units by mouth daily      vitamin B-12 (CYANOCOBALAMIN) 1000 MCG tablet Take 1,000 mcg by mouth daily       No current facility-administered medications for this visit.      Allergies: Ceclor [cefaclor], Lortab [hydrocodone-acetaminophen], Penicillins, Sulfa antibiotics, and Nabumetone  Past Medical History:   Diagnosis Date    Allergic rhinitis     Cancer ([facility] [address]     cervical, lymphoma    Congenital heart disease  Mesenteric artery stenosis (HCC)     MRSA infection     Stroke [facility])     after heart surgery 2013     Past Surgical History:   Procedure Laterality Date   [employer] CARDIAC SURGERY      Ross procedure and surgery prior on valve    PARTIAL HYSTERECTOMY (CERVIX NOT REMOVED)       Family History   Problem Relation Age of Onset    Diabetes Mother     Heart Disease Mother     Cancer Father      Social History     Tobacco Use    Smoking status: Current Every Day Smoker     Packs/day: 1.00     Types: Cigarettes     Start date: 1990    Smokeless tobacco: Never Used   Substance Use Topics    Alcohol use: Never       ROS  Eyes - no sudden vision change or amaurosis. Respiratory - no significant shortness of breath,  Cardiovascular - no chest pain or syncope. No  significant leg swelling.  minimal claudication. Musculoskeletal - has gait disturbance  Skin - no new wound. Neurologic -  No speech difficulty or lateralizing weakness. All other review of systems are negative. Physical Exam    BP (!) 147/74 Comment: left  Pulse 79   Temp 97.2 °F (36.2 °C)   Resp 18   SpO2 98%       Neck- carotid pulses 2+ to palpation with no bruit  Cardiovascular - Regular rate and rhythm. Pulmonary - effort appears normal.  No respiratory distress. Lungs - Breath sounds normal. No wheezes or rales. Extremities -  - Radial and brachial pulses are 2+ to palpation bilaterally. Right femoral pulse: present 2+; Right popliteal pulse: absent Right DP: absent; Right PT absent; Left femoral pulse: present 1+; Left popliteal pulse: absent; Left DP: absent; Left PT: absent No cyanosis, clubbing, or significant edema. No signs atheroembolic event. Neurologic - alert and oriented X 3. Physiologic. Face symmetric. Skin - warm, dry, and intact.   no wound  Psychiatric - mood, affect, and behavior appear normal.  Judgment and thought processes appear normal.    Risk factors for atherosclerosis of all vascular beds have been reviewed with the patient including:  Family history, tobacco abuse in all forms, elevated cholesterol, hyperlipidemia, and diabetes. Lower extremity arterial study: Right ROVERTO 1.13, Left ROVERTO 0.82. Individual films reviewed: Yes. These results were reviewed with the patient. Disease process is stable and chronic  by review of waveforms, I see no significant change      Reviewed previous studies including: jefry  Individual images were reviewed. I agree with the findings  Results were discussed with the patient. ASSESSMENT/PLAN:  1. Atheroscler of native artery of both legs with intermit claudication ([address]    1. Atheroscler of native artery of both legs with intermit claudication ([address]     stable and chronic    Discussed management of jefry which includes:  continue asa, pletal and lipitor to reduce risk of arterial thrombosis and to decrease rate of plaque buildup  Strongly encourage start/continue statin therapy -   Recommend no smoking - discussed the effect tobacco has on illness;   Proceed with 6 months with jefry          Patient instructed to walk as much as possible. Call our office with any progressive pain in leg(s) or hip(s) with walking. Take good care of your feet. Let us know right away if you develop a wound on your foot. An electronic signature was used to authenticate this note.     --MELIDA Mercer

## 2022-07-27 ENCOUNTER — TELEPHONE (OUTPATIENT)
Dept: PRIMARY CARE CLINIC | Age: 47
End: 2022-07-27

## 2022-07-27 NOTE — TELEPHONE ENCOUNTER
Spoke with patient regarding her appointments with Yvette Kim LCSW. Patient would like to discuss other therapy options. At this time, all new patient referrals need to go outside of [facility] or to [facility] until we have a new provider in place. [facility], [facility], [address] [facility] have capacity to see accept new; please [facility] last as they only have one provider currently.

## 2022-08-08 DIAGNOSIS — J45.41 MODERATE PERSISTENT ASTHMA WITH ACUTE EXACERBATION: ICD-10-CM

## 2022-08-11 RX ORDER — FLUTICASONE PROPIONATE AND SALMETEROL XINAFOATE 115; 21 UG/1; UG/1
AEROSOL, METERED RESPIRATORY (INHALATION)
Qty: 24 G | Refills: 5 | Status: SHIPPED | OUTPATIENT
Start: 2022-08-11 | End: 2022-09-15 | Stop reason: ALTCHOICE

## 2022-09-07 ENCOUNTER — TELEPHONE (OUTPATIENT)
Dept: VASCULAR SURGERY | Age: 47
End: 2022-09-07

## 2022-09-07 DIAGNOSIS — G47.00 INSOMNIA, UNSPECIFIED TYPE: ICD-10-CM

## 2022-09-07 NOTE — TELEPHONE ENCOUNTER
Simon Kernssper called to request a refill on her medication.       Last office visit : 5/10/2022   Next office visit : Visit date not found     Requested Prescriptions     Pending Prescriptions Disp Refills    traZODone (DESYREL) 100 MG tablet [Pharmacy Med Name: TRAZODONE  MG TABS 100 Tablet] 180 tablet 1     Sig: TAKE TWO TABLETS BY MOUTH EVERY NIGHT AT BEDTIME AS NEEDED FOR SLEEP ** MAY CAUSE DROWSINESS **            Primo Flair

## 2022-09-07 NOTE — TELEPHONE ENCOUNTER
Patient called in to say that she is having some trouble walking and her leg is hurting in the top part of the hip. The patient asked if Wero Mueller could help her get arm bands to help her walk. Per Wero Mueller she could make an appt on Friday to be seen for more testing. The patient stated that she had another appt in [place] that day and couldn't make it in. She stated that she would contact our office to make her next apt.

## 2022-09-08 RX ORDER — TRAZODONE HYDROCHLORIDE 100 MG/1
TABLET ORAL
Qty: 180 TABLET | Refills: 0 | Status: SHIPPED | OUTPATIENT
Start: 2022-09-08 | End: 2022-09-15 | Stop reason: SDUPTHER

## 2022-09-15 ENCOUNTER — OFFICE VISIT (OUTPATIENT)
Dept: FAMILY MEDICINE CLINIC | Age: 47
End: 2022-09-15
Payer: MEDICARE

## 2022-09-15 VITALS
RESPIRATION RATE: 20 BRPM | OXYGEN SATURATION: 96 % | DIASTOLIC BLOOD PRESSURE: 80 MMHG | SYSTOLIC BLOOD PRESSURE: 146 MMHG | HEIGHT: 63 IN | TEMPERATURE: 96.3 F | BODY MASS INDEX: 24.8 KG/M2 | WEIGHT: 140 LBS | HEART RATE: 89 BPM

## 2022-09-15 DIAGNOSIS — I70.213 ATHEROSCLEROSIS OF NATIVE ARTERY OF BOTH LOWER EXTREMITIES WITH INTERMITTENT CLAUDICATION (HCC): ICD-10-CM

## 2022-09-15 DIAGNOSIS — E53.8 FOLATE DEFICIENCY: ICD-10-CM

## 2022-09-15 DIAGNOSIS — R26.81 GAIT INSTABILITY: Primary | ICD-10-CM

## 2022-09-15 DIAGNOSIS — F33.1 MODERATE EPISODE OF RECURRENT MAJOR DEPRESSIVE DISORDER (HCC): ICD-10-CM

## 2022-09-15 DIAGNOSIS — J45.41 MODERATE PERSISTENT ASTHMA WITH ACUTE EXACERBATION: ICD-10-CM

## 2022-09-15 DIAGNOSIS — E78.5 DYSLIPIDEMIA: ICD-10-CM

## 2022-09-15 DIAGNOSIS — K55.1 MESENTERIC ARTERY STENOSIS (HCC): ICD-10-CM

## 2022-09-15 DIAGNOSIS — G47.00 INSOMNIA, UNSPECIFIED TYPE: ICD-10-CM

## 2022-09-15 PROCEDURE — G8427 DOCREV CUR MEDS BY ELIG CLIN: HCPCS | Performed by: NURSE PRACTITIONER

## 2022-09-15 PROCEDURE — 4004F PT TOBACCO SCREEN RCVD TLK: CPT | Performed by: NURSE PRACTITIONER

## 2022-09-15 PROCEDURE — G8420 CALC BMI NORM PARAMETERS: HCPCS | Performed by: NURSE PRACTITIONER

## 2022-09-15 PROCEDURE — 99214 OFFICE O/P EST MOD 30 MIN: CPT | Performed by: NURSE PRACTITIONER

## 2022-09-15 RX ORDER — TRAZODONE HYDROCHLORIDE 100 MG/1
TABLET ORAL
Qty: 180 TABLET | Refills: 1 | Status: SHIPPED | OUTPATIENT
Start: 2022-09-15

## 2022-09-15 RX ORDER — OXYCODONE AND ACETAMINOPHEN 10; 325 MG/1; MG/1
1 TABLET ORAL EVERY 6 HOURS PRN
COMMUNITY

## 2022-09-15 RX ORDER — FLUOXETINE HYDROCHLORIDE 40 MG/1
CAPSULE ORAL
Qty: 90 CAPSULE | Refills: 1 | Status: SHIPPED | OUTPATIENT
Start: 2022-09-15

## 2022-09-15 RX ORDER — BIOTIN 1 MG
TABLET ORAL
COMMUNITY

## 2022-09-15 RX ORDER — PERPHENAZINE AND AMITRIPTYLINE HYDROCHLORIDE 4; 25 MG/1; MG/1
1 TABLET, FILM COATED ORAL 2 TIMES DAILY
Qty: 180 TABLET | Refills: 1 | Status: SHIPPED | OUTPATIENT
Start: 2022-09-15 | End: 2022-09-23

## 2022-09-15 RX ORDER — ALBUTEROL SULFATE 90 UG/1
2 AEROSOL, METERED RESPIRATORY (INHALATION) EVERY 6 HOURS PRN
Qty: 18 G | Refills: 5 | Status: SHIPPED | OUTPATIENT
Start: 2022-09-15

## 2022-09-15 RX ORDER — ALBUTEROL SULFATE 1.25 MG/3ML
1 SOLUTION RESPIRATORY (INHALATION) EVERY 6 HOURS PRN
Qty: 360 ML | Refills: 3 | Status: SHIPPED | OUTPATIENT
Start: 2022-09-15

## 2022-09-15 SDOH — ECONOMIC STABILITY: FOOD INSECURITY: WITHIN THE PAST 12 MONTHS, THE FOOD YOU BOUGHT JUST DIDN'T LAST AND YOU DIDN'T HAVE MONEY TO GET MORE.: NEVER TRUE

## 2022-09-15 SDOH — ECONOMIC STABILITY: FOOD INSECURITY: WITHIN THE PAST 12 MONTHS, YOU WORRIED THAT YOUR FOOD WOULD RUN OUT BEFORE YOU GOT MONEY TO BUY MORE.: NEVER TRUE

## 2022-09-15 ASSESSMENT — ENCOUNTER SYMPTOMS
CONSTIPATION: 1
RHINORRHEA: 0
COUGH: 1
FACIAL SWELLING: 0
ABDOMINAL DISTENTION: 1
SHORTNESS OF BREATH: 1

## 2022-09-15 ASSESSMENT — SOCIAL DETERMINANTS OF HEALTH (SDOH): HOW HARD IS IT FOR YOU TO PAY FOR THE VERY BASICS LIKE FOOD, HOUSING, MEDICAL CARE, AND HEATING?: NOT HARD AT ALL
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
## 2022-09-19 ENCOUNTER — HOSPITAL ENCOUNTER (OUTPATIENT)
Dept: GENERAL RADIOLOGY | Age: 47
Discharge: HOME | End: 2022-09-19
Payer: MEDICARE

## 2022-09-19 DIAGNOSIS — J45.41 MODERATE PERSISTENT ASTHMA WITH ACUTE EXACERBATION: ICD-10-CM

## 2022-09-19 DIAGNOSIS — E78.5 DYSLIPIDEMIA: ICD-10-CM

## 2022-09-19 DIAGNOSIS — E53.8 FOLATE DEFICIENCY: ICD-10-CM

## 2022-09-19 LAB
ALBUMIN SERPL-MCNC: 4.9 G/DL (ref 3.5–5.2)
ALP BLD-CCNC: 87 U/L (ref 35–104)
ALT SERPL-CCNC: 9 U/L (ref 5–33)
ANION GAP SERPL CALCULATED.3IONS-SCNC: 18 MMOL/L (ref 7–19)
AST SERPL-CCNC: 15 U/L (ref 5–32)
BILIRUB SERPL-MCNC: 0.5 MG/DL (ref 0.2–1.2)
BUN BLDV-MCNC: 9 MG/DL (ref 6–20)
CALCIUM SERPL-MCNC: 9.8 MG/DL (ref 8.6–10)
CHLORIDE BLD-SCNC: 102 MMOL/L (ref 98–111)
CHOLESTEROL, FASTING: 216 MG/DL (ref 160–199)
CO2: 21 MMOL/L (ref 22–29)
CREAT SERPL-MCNC: 0.8 MG/DL (ref 0.5–0.9)
FOLATE: 16.9 NG/ML (ref 4.8–37.3)
GFR AFRICAN AMERICAN: >59
GFR NON-AFRICAN AMERICAN: >60
GLUCOSE BLD-MCNC: 83 MG/DL (ref 74–109)
HCT VFR BLD CALC: 48.1 % (ref 37–47)
HDLC SERPL-MCNC: 58 MG/DL (ref 65–121)
HEMOGLOBIN: 15.3 G/DL (ref 12–16)
LDL CHOLESTEROL CALCULATED: 131 MG/DL
MCH RBC QN AUTO: 31 PG (ref 27–31)
MCHC RBC AUTO-ENTMCNC: 31.8 G/DL (ref 33–37)
MCV RBC AUTO: 97.4 FL (ref 81–99)
PDW BLD-RTO: 13.7 % (ref 11.5–14.5)
PLATELET # BLD: 329 K/UL (ref 130–400)
PMV BLD AUTO: 10.2 FL (ref 9.4–12.3)
POTASSIUM SERPL-SCNC: 4 MMOL/L (ref 3.5–5)
RBC # BLD: 4.94 M/UL (ref 4.2–5.4)
SODIUM BLD-SCNC: 141 MMOL/L (ref 136–145)
TOTAL PROTEIN: 7.4 G/DL (ref 6.6–8.7)
TRIGLYCERIDE, FASTING: 137 MG/DL (ref 0–149)
WBC # BLD: 6.1 K/UL (ref 4.8–10.8)

## 2022-09-19 PROCEDURE — 71046 X-RAY EXAM CHEST 2 VIEWS: CPT

## 2022-09-19 PROCEDURE — 71046 X-RAY EXAM CHEST 2 VIEWS: CPT | Performed by: RADIOLOGY

## 2022-09-21 ENCOUNTER — TELEPHONE (OUTPATIENT)
Dept: FAMILY MEDICINE CLINIC | Age: 47
End: 2022-09-21

## 2022-09-22 RX ORDER — ROSUVASTATIN CALCIUM 10 MG/1
10 TABLET, COATED ORAL DAILY
Qty: 90 TABLET | Refills: 1 | Status: SHIPPED | OUTPATIENT
Start: 2022-09-22 | End: 2022-09-23

## 2022-09-23 ENCOUNTER — OFFICE VISIT (OUTPATIENT)
Dept: FAMILY MEDICINE CLINIC | Age: 47
End: 2022-09-23
Payer: MEDICARE

## 2022-09-23 VITALS
OXYGEN SATURATION: 98 % | RESPIRATION RATE: 18 BRPM | DIASTOLIC BLOOD PRESSURE: 82 MMHG | SYSTOLIC BLOOD PRESSURE: 160 MMHG | HEART RATE: 87 BPM | WEIGHT: 142 LBS | BODY MASS INDEX: 25.16 KG/M2 | TEMPERATURE: 97.9 F | HEIGHT: 63 IN

## 2022-09-23 DIAGNOSIS — F41.8 SITUATIONAL ANXIETY: ICD-10-CM

## 2022-09-23 DIAGNOSIS — F41.1 GENERALIZED ANXIETY DISORDER: Primary | ICD-10-CM

## 2022-09-23 PROCEDURE — G8419 CALC BMI OUT NRM PARAM NOF/U: HCPCS | Performed by: CLINICAL NURSE SPECIALIST

## 2022-09-23 PROCEDURE — 4004F PT TOBACCO SCREEN RCVD TLK: CPT | Performed by: CLINICAL NURSE SPECIALIST

## 2022-09-23 PROCEDURE — 99213 OFFICE O/P EST LOW 20 MIN: CPT | Performed by: CLINICAL NURSE SPECIALIST

## 2022-09-23 PROCEDURE — G8427 DOCREV CUR MEDS BY ELIG CLIN: HCPCS | Performed by: CLINICAL NURSE SPECIALIST

## 2022-09-23 RX ORDER — ROSUVASTATIN CALCIUM 10 MG/1
10 TABLET, COATED ORAL DAILY
Qty: 90 TABLET | Refills: 1
Start: 2022-09-23

## 2022-09-23 RX ORDER — LORAZEPAM 0.5 MG/1
0.5 TABLET ORAL 3 TIMES DAILY PRN
Qty: 9 TABLET | Refills: 0 | Status: SHIPPED | OUTPATIENT
Start: 2022-09-23 | End: 2022-09-26
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
## 2022-10-03 ENCOUNTER — OFFICE VISIT (OUTPATIENT)
Dept: FAMILY MEDICINE CLINIC | Age: 47
End: 2022-10-03
Payer: MEDICARE

## 2022-10-03 VITALS
TEMPERATURE: 97.1 F | OXYGEN SATURATION: 95 % | SYSTOLIC BLOOD PRESSURE: 122 MMHG | HEART RATE: 81 BPM | RESPIRATION RATE: 20 BRPM | WEIGHT: 139 LBS | BODY MASS INDEX: 24.62 KG/M2 | DIASTOLIC BLOOD PRESSURE: 78 MMHG

## 2022-10-03 DIAGNOSIS — B00.9 HERPES INFECTION: Primary | ICD-10-CM

## 2022-10-03 DIAGNOSIS — F41.9 ANXIETY: ICD-10-CM

## 2022-10-03 DIAGNOSIS — Z91.09 ALLERGY TO MITES: ICD-10-CM

## 2022-10-03 PROCEDURE — G8420 CALC BMI NORM PARAMETERS: HCPCS | Performed by: NURSE PRACTITIONER

## 2022-10-03 PROCEDURE — 4004F PT TOBACCO SCREEN RCVD TLK: CPT | Performed by: NURSE PRACTITIONER

## 2022-10-03 PROCEDURE — G8484 FLU IMMUNIZE NO ADMIN: HCPCS | Performed by: NURSE PRACTITIONER

## 2022-10-03 PROCEDURE — G8427 DOCREV CUR MEDS BY ELIG CLIN: HCPCS | Performed by: NURSE PRACTITIONER

## 2022-10-03 PROCEDURE — 99213 OFFICE O/P EST LOW 20 MIN: CPT | Performed by: NURSE PRACTITIONER

## 2022-10-03 RX ORDER — PERMETHRIN 50 MG/G
CREAM TOPICAL
Qty: 60 G | Refills: 0 | Status: SHIPPED | OUTPATIENT
Start: 2022-10-03

## 2022-10-03 RX ORDER — VALACYCLOVIR HYDROCHLORIDE 1 G/1
1000 TABLET, FILM COATED ORAL 2 TIMES DAILY
Qty: 20 TABLET | Refills: 1 | Status: SHIPPED | OUTPATIENT
Start: 2022-10-03 | End: 2022-10-27

## 2022-10-03 ASSESSMENT — ENCOUNTER SYMPTOMS: COLOR CHANGE: 1

## 2022-10-03 NOTE — PROGRESS NOTES
SUBJECTIVE:    Patient ID: Anny Parikh is a 51 y.o. female. HPI:   HPI   Someone has dropped off 7 cats with multiple fleas. Taking care of them while owner is gone. The owner is gone into [facility] and [place] has come over to take care of the cats that her in the home someone dropped him off to have multiple fleas she has been trying to care for them and bathe them and all up and down her arms are little bites she says there is some on her back and once he is Alistair Pelinderjit is a do on her arms probably where she has been handling them were going to try some Elamite cream for those sites she also has an area on her buttocks in which she said a physician told her at 1 time it was kind herpes type infection and I tried to explain to her it is treated the same way however it is not what you call genital herpes it can be irritated from sitting which she does complain of 1 BM, blistery like and painful  Again were talking about her anxiety I have reservations about prescribing her Xanax or Ativan she is resistant to going to psychiatry which she receives Percocet from her pain management and she has been discharged from 3 pain management around here she is gone back to [facility] to get her pain medication so I have explained to her about the black box warning and the concerns that I have but she is willing to see a psychiatrist now so we will make that referral    Places on bottom: red broken skin papules. Past Medical History:   Diagnosis Date    Allergic rhinitis     Cancer ([facility] [address]     cervical, lymphoma    Congenital heart disease     Mesenteric artery stenosis ([facility] [address]     MRSA infection     Stroke [facility])     after heart surgery 2013      Prior to Visit Medications    Medication Sig Taking? Authorizing Provider   permethrin (ELIMITE) 5 % cream Apply topically as directed apply from chin to toes then leave on 8 hours then rinse.  Yes Rica Hands, APRN   valACYclovir (VALTREX) 1 g tablet Take 1 tablet by mouth 2 times daily for 10 days Yes MELIDA Elizalde   rosuvastatin (CRESTOR) 10 MG tablet Take 1 tablet by mouth daily Yes MELIDA Bautista   Biotin 1000 MCG TABS Take by mouth Yes Historical Provider, MD   oxyCODONE-acetaminophen (PERCOCET)  MG per tablet Take 1 tablet by mouth every 6 hours as needed for Pain. Yes Historical Provider, MD   FLUoxetine (PROZAC) 40 MG capsule TAKE ONE CAPSULE BY MOUTH DAILY.  Yes MELIDA Elizalde   traZODone (DESYREL) 100 MG tablet TAKE TWO TABLETS BY MOUTH EVERY NIGHT AT BEDTIME AS NEEDED FOR SLEEP ** MAY CAUSE DROWSINESS ** Yes MELIDA Elizalde   albuterol sulfate HFA (PROAIR HFA) 108 (90 Base) MCG/ACT inhaler Inhale 2 puffs into the lungs every 6 hours as needed for Wheezing Yes MELIDA Elizalde   albuterol (ACCUNEB) 1.25 MG/3ML nebulizer solution Inhale 3 mLs into the lungs every 6 hours as needed for Wheezing Yes MELIDA Elizalde   fluticasone-salmeterol (ADVAIR HFA) 115-21 MCG/ACT inhaler Inhale 2 puffs into the lungs 2 times daily Yes MELIDA Elizalde   ibuprofen (ADVIL;MOTRIN) 600 MG tablet TAKE ONE TABLET BY MOUTH THREE TIMES DAILY AS NEEDED FOR PAIN WITH FOOD ** MAY CAUSE DROWSINESS ** Yes MELIDA Elizalde   cilostazol (PLETAL) 100 MG tablet TAKE 1 TABLET BY MOUTH 2 TIMES DAILY Yes MELIDA Mcfarland   furosemide (LASIX) 20 MG tablet TAKE 1 TABLET BY MOUTH 2 TIMES DAILY Yes MELIDA Elizalde   folic acid (FOLATE) 711 MCG tablet Take 1 tablet by mouth daily Yes MELIDA Elizalde   albuterol sulfate HFA (VENTOLIN HFA) 108 (90 Base) MCG/ACT inhaler Inhale 2 puffs into the lungs 4 times daily as needed for Wheezing Yes MELIDA Elizalde   tiZANidine (ZANAFLEX) 4 MG tablet TAKE ONE TABLET BY MOUTH THREE TIMES DAILY AS NEEDED FOR HIP AND BACK PAIN Yes MELIDA Elizalde   losartan (COZAAR) 50 MG tablet Take 1/2 (one-half) tablet by mouth once daily Yes Odette Clause, APRN   Cranberry 125 MG TABS Take by mouth  Yes Historical Provider, MD   aspirin 81 MG EC tablet Take 81 mg by mouth daily Yes Historical Provider, MD   vitamin B-12 (CYANOCOBALAMIN) 1000 MCG tablet Take 1,000 mcg by mouth daily Yes Historical Provider, MD   vitamin D3 (CHOLECALCIFEROL) 10 MCG (400 UNIT) TABS tablet Take 400 Units by mouth daily  Historical Provider, MD      Allergies   Allergen Reactions    Ceclor [Cefaclor] Swelling    Lortab [Hydrocodone-Acetaminophen]     Penicillins Hives    Sulfa Antibiotics     Nabumetone Nausea And Vomiting       Review of Systems   Skin:  Positive for color change and rash. Psychiatric/Behavioral:  Positive for agitation and sleep disturbance. The patient is nervous/anxious. OBJECTIVE:    Physical Exam  Constitutional:       Appearance: Normal appearance. She is well-developed. She is not ill-appearing. HENT:      Head: Normocephalic and atraumatic. Right Ear: External ear normal.      Left Ear: External ear normal.      Nose: Nose normal.      Mouth/Throat:      Lips: Pink. Mouth: Mucous membranes are moist.   Eyes:      General: Lids are normal.      Extraocular Movements: Extraocular movements intact. Conjunctiva/sclera: Conjunctivae normal.   Cardiovascular:      Rate and Rhythm: Normal rate and regular rhythm. Heart sounds: Normal heart sounds. No murmur heard. Pulmonary:      Effort: Pulmonary effort is normal.      Breath sounds: Normal breath sounds. Musculoskeletal:      Cervical back: Normal range of motion. Skin:     General: Skin is warm and dry. Findings: Erythema and rash present. Rash is papular. Neurological:      Mental Status: She is alert and oriented to person, place, and time. Motor: No weakness or tremor. Coordination: Coordination is intact.    Psychiatric:         Attention and Perception: Attention and perception normal.         Mood and Affect: Mood normal.         Speech: Speech normal.         Behavior: Behavior normal. Behavior is cooperative. Thought Content: Thought content normal.         Cognition and Memory: Cognition and memory normal.         Judgment: Judgment normal.      /78 (Site: Left Upper Arm, Position: Sitting, Cuff Size: Medium Adult)   Pulse 81   Temp 97.1 °F (36.2 °C) (Temporal)   Resp 20   Wt 139 lb (63 kg)   SpO2 95%   BMI 24.62 kg/m²      ASSESSMENT:      ICD-10-CM    1. Herpes infection  B00.9 valACYclovir (VALTREX) 1 g tablet      2. Allergy to mites  Z91.09 permethrin (ELIMITE) 5 % cream      3. Anxiety  F41.9 External Referral To Psychiatry          PLAN:    Nae Meyers: Skin Problem (Skin irritation all over. Itching ), Anxiety (Is having anxiety issues . ), and Rash (Rash on butt that welps up and hurts )      Diagnosis and orders for this visit are above.

## 2022-10-21 DIAGNOSIS — B00.9 HERPES INFECTION: ICD-10-CM

## 2022-10-21 DIAGNOSIS — G47.00 INSOMNIA, UNSPECIFIED TYPE: ICD-10-CM

## 2022-10-25 NOTE — TELEPHONE ENCOUNTER
Nedracristian Bentley called to request a refill on her medication. Last office visit : 10/3/2022   Next office visit : Visit date not found     Requested Prescriptions     Pending Prescriptions Disp Refills    valACYclovir (VALTREX) 1 g tablet [Pharmacy Med Name: VALACYCLOVIR HCL 1 GM TAB 1 Tablet] 20 tablet 1     Sig: TAKE 1 TABLET BY MOUTH 2 TIMES DAILY FOR 10 DAYS    doxepin (SINEQUAN) 25 MG capsule [Pharmacy Med Name: DOXEPIN HCL 25 MG CAPS 25 Capsule] 90 capsule 0     Sig: TAKE ONE CAPSULE BY MOUTH EVERY NIGHT AT BEDTIME.             Catrachito Meade

## 2022-10-27 RX ORDER — VALACYCLOVIR HYDROCHLORIDE 1 G/1
1000 TABLET, FILM COATED ORAL 2 TIMES DAILY
Qty: 20 TABLET | Refills: 1 | Status: SHIPPED | OUTPATIENT
Start: 2022-10-27 | End: 2022-11-06

## 2022-10-27 RX ORDER — DOXEPIN HYDROCHLORIDE 25 MG/1
CAPSULE ORAL
Qty: 90 CAPSULE | Refills: 0 | Status: SHIPPED | OUTPATIENT
Start: 2022-10-27

## 2022-11-09 NOTE — TELEPHONE ENCOUNTER
Lucille Ashley called to request a refill on her medication.       Last office visit : 10/3/2022   Next office visit : Visit date not found     Requested Prescriptions     Pending Prescriptions Disp Refills    tiZANidine (Elgin Payton) 4 MG tablet [Pharmacy Med Name: TIZANIDINE HCL 4 MG TAB 4 Tablet] 60 tablet 5     Sig: TAKE ONE TABLET BY MOUTH THREE TIMES DAILY AS NEEDED FOR HIP AND BACK PAIN            José Waddell

## 2022-11-10 RX ORDER — TIZANIDINE 4 MG/1
TABLET ORAL
Qty: 60 TABLET | Refills: 5 | Status: SHIPPED | OUTPATIENT
Start: 2022-11-10

## 2022-12-29 DIAGNOSIS — B00.9 HERPES INFECTION: ICD-10-CM

## 2022-12-29 RX ORDER — CILOSTAZOL 100 MG/1
100 TABLET ORAL 2 TIMES DAILY
Qty: 60 TABLET | Refills: 3 | Status: SHIPPED | OUTPATIENT
Start: 2022-12-29

## 2022-12-29 NOTE — TELEPHONE ENCOUNTER
Perla William called to request a refill on her medication.       Last office visit : 10/3/2022   Next office visit : Visit date not found     Requested Prescriptions     Pending Prescriptions Disp Refills    valACYclovir (VALTREX) 1 g tablet [Pharmacy Med Name: VALACYCLOVIR HCL 1 GM TABS 1 Tablet] 20 tablet 1     Sig: TAKE ONE TABLET BY MOUTH TWICE A DAY FOR TEN DAYS            Talbotton How

## 2023-01-05 RX ORDER — VALACYCLOVIR HYDROCHLORIDE 1 G/1
TABLET, FILM COATED ORAL
Qty: 20 TABLET | Refills: 1 | Status: SHIPPED | OUTPATIENT
Start: 2023-01-05

## 2023-01-17 ENCOUNTER — HOSPITAL ENCOUNTER (OUTPATIENT)
Dept: VASCULAR LAB | Age: 48
Discharge: HOME | End: 2023-01-17
Payer: MEDICARE

## 2023-01-17 ENCOUNTER — OFFICE VISIT (OUTPATIENT)
Dept: VASCULAR SURGERY | Age: 48
End: 2023-01-17
Payer: MEDICARE

## 2023-01-17 VITALS
OXYGEN SATURATION: 94 % | WEIGHT: 130 LBS | SYSTOLIC BLOOD PRESSURE: 165 MMHG | DIASTOLIC BLOOD PRESSURE: 72 MMHG | TEMPERATURE: 97.8 F | HEIGHT: 63 IN | BODY MASS INDEX: 23.04 KG/M2 | HEART RATE: 72 BPM

## 2023-01-17 DIAGNOSIS — I70.213 ATHEROSCLER OF NATIVE ARTERY OF BOTH LEGS WITH INTERMIT CLAUDICATION (HCC): ICD-10-CM

## 2023-01-17 DIAGNOSIS — I70.213 ATHEROSCLER OF NATIVE ARTERY OF BOTH LEGS WITH INTERMIT CLAUDICATION (HCC): Primary | ICD-10-CM

## 2023-01-17 PROCEDURE — G8420 CALC BMI NORM PARAMETERS: HCPCS | Performed by: NURSE PRACTITIONER

## 2023-01-17 PROCEDURE — 99214 OFFICE O/P EST MOD 30 MIN: CPT | Performed by: NURSE PRACTITIONER

## 2023-01-17 PROCEDURE — 4004F PT TOBACCO SCREEN RCVD TLK: CPT | Performed by: NURSE PRACTITIONER

## 2023-01-17 PROCEDURE — 93923 UPR/LXTR ART STDY 3+ LVLS: CPT

## 2023-01-17 PROCEDURE — G8427 DOCREV CUR MEDS BY ELIG CLIN: HCPCS | Performed by: NURSE PRACTITIONER

## 2023-01-17 PROCEDURE — G8484 FLU IMMUNIZE NO ADMIN: HCPCS | Performed by: NURSE PRACTITIONER

## 2023-01-17 NOTE — PROGRESS NOTES
Beryle Ip (:  1975) is a 49 y.o. female,Established patient, here for evaluation of the following chief complaint(s):  Follow-up (Follow up [facility].)            SUBJECTIVE/OBJECTIVE:  Imelda Cohen has a history of peripheral vascular disease of the lower extremities. She has had this for 1 - 5 years. Current treatment includes ASA EC daily. Imelda Cohen has not had new wounds. Recently, she reports claudication at a distance of  which varies. Imelda Cohen reports that the right leg is more signifcant than the left . She reports claudication is not changed and is mostly in the form of crampy type pain starting in the calves. She has a short recovery time. This is reproducible in nature. She reports ischemic rest pain 0 times per night. She reports walking with cart does not help.       Beryle Ip is a 49 y.o. female with the following history as recorded in [facility]:  Patient Active Problem List    Diagnosis Date Noted    Chronic diastolic congestive heart failure ([facility] [address] 2022    Moderate episode of recurrent major depressive disorder ([address] 2022    Lymphoma of intra-abdominal lymph nodes, unspecified lymphoma type ([address] 2021    Atherosclerosis of native artery of both lower extremities with intermittent claudication ([address] 2021    Mesenteric artery stenosis (HCC)     Nerve pain 2021    Pain medication agreement 2021    Chest pain with moderate risk for cardiac etiology 2021    Cognitive deficit as late effect of cerebrovascular accident (CVA) 2021    Generalized anxiety disorder 2021    Dyslipidemia 2021    Chronic bilateral low back pain without sciatica 2021     Current Outpatient Medications   Medication Sig Dispense Refill    valACYclovir (VALTREX) 1 g tablet TAKE ONE TABLET BY MOUTH TWICE A DAY FOR TEN DAYS 20 tablet 1    cilostazol (PLETAL) 100 MG tablet TAKE 1 TABLET BY MOUTH 2 TIMES DAILY 60 tablet 3    tiZANidine (ZANAFLEX) 4 MG tablet TAKE ONE TABLET BY MOUTH THREE TIMES DAILY AS NEEDED FOR HIP AND BACK PAIN 60 tablet 5    doxepin (SINEQUAN) 25 MG capsule TAKE ONE CAPSULE BY MOUTH EVERY NIGHT AT BEDTIME. 90 capsule 0    permethrin (ELIMITE) 5 % cream Apply topically as directed apply from chin to toes then leave on 8 hours then rinse. 60 g 0    Biotin 1000 MCG TABS Take by mouth      oxyCODONE-acetaminophen (PERCOCET)  MG per tablet Take 1 tablet by mouth every 6 hours as needed for Pain. FLUoxetine (PROZAC) 40 MG capsule TAKE ONE CAPSULE BY MOUTH DAILY.  90 capsule 1    traZODone (DESYREL) 100 MG tablet TAKE TWO TABLETS BY MOUTH EVERY NIGHT AT BEDTIME AS NEEDED FOR SLEEP ** MAY CAUSE DROWSINESS ** 180 tablet 1    albuterol sulfate HFA (PROAIR HFA) 108 (90 Base) MCG/ACT inhaler Inhale 2 puffs into the lungs every 6 hours as needed for Wheezing 18 g 5    albuterol (ACCUNEB) 1.25 MG/3ML nebulizer solution Inhale 3 mLs into the lungs every 6 hours as needed for Wheezing 360 mL 3    fluticasone-salmeterol (ADVAIR HFA) 115-21 MCG/ACT inhaler Inhale 2 puffs into the lungs 2 times daily 1 each 5    ibuprofen (ADVIL;MOTRIN) 600 MG tablet TAKE ONE TABLET BY MOUTH THREE TIMES DAILY AS NEEDED FOR PAIN WITH FOOD ** MAY CAUSE DROWSINESS ** 270 tablet 0    furosemide (LASIX) 20 MG tablet TAKE 1 TABLET BY MOUTH 2 TIMES DAILY 60 tablet 5    folic acid (FOLATE) 643 MCG tablet Take 1 tablet by mouth daily 30 tablet 5    losartan (COZAAR) 50 MG tablet Take 1/2 (one-half) tablet by mouth once daily 30 tablet 5    Cranberry 125 MG TABS Take by mouth       aspirin 81 MG EC tablet Take 81 mg by mouth daily      vitamin D3 (CHOLECALCIFEROL) 10 MCG (400 UNIT) TABS tablet Take 400 Units by mouth daily      vitamin B-12 (CYANOCOBALAMIN) 1000 MCG tablet Take 1,000 mcg by mouth daily      rosuvastatin (CRESTOR) 10 MG tablet Take 1 tablet by mouth daily (Patient not taking: Reported on 1/17/2023) 90 tablet 1    albuterol sulfate HFA (VENTOLIN HFA) 108 (90 Base) MCG/ACT inhaler Inhale 2 puffs into the lungs 4 times daily as needed for Wheezing (Patient not taking: Reported on 1/17/2023) 1 each 5     No current facility-administered medications for this visit. Allergies: Ceclor [cefaclor], Lortab [hydrocodone-acetaminophen], Penicillins, Sulfa antibiotics, and Nabumetone  Past Medical History:   Diagnosis Date    Allergic rhinitis     Cancer ([facility] [address]     cervical, lymphoma    Congenital heart disease     Mesenteric artery stenosis (HCC)     MRSA infection     Stroke [facility])     after heart surgery 2013     Past Surgical History:   Procedure Laterality Date    CARDIAC SURGERY      Ross procedure and surgery prior on valve    PARTIAL HYSTERECTOMY (CERVIX NOT REMOVED)       Family History   Problem Relation Age of Onset    Diabetes Mother     Heart Disease Mother     Cancer Father      Social History     Tobacco Use    Smoking status: Every Day     Packs/day: 1.00     Types: Cigarettes     Start date: 200    Smokeless tobacco: Never   Substance Use Topics    Alcohol use: Never       ROS  Eyes - no sudden vision change or amaurosis. Respiratory - no significant shortness of breath,  Cardiovascular - no chest pain or syncope. No  significant leg swelling.  has claudication. Musculoskeletal - no gait disturbance  Skin - no new wound. Neurologic -  No speech difficulty or lateralizing weakness. All other review of systems are negative. Physical Exam    BP (!) 165/72 (Site: Right Upper Arm, Position: Sitting, Cuff Size: Medium Adult)   Pulse 72   Temp 97.8 °F (36.6 °C)   Ht 5' 3\" (1.6 m)   Wt 130 lb (59 kg)   SpO2 94%   BMI 23.03 kg/m²       Neck- carotid pulses 2+ to palpation with no bruit  Cardiovascular - Regular rate and rhythm. Pulmonary - effort appears normal.  No respiratory distress. Lungs - Breath sounds normal. No wheezes or rales. Extremities -  - Radial and brachial pulses are 2+ to palpation bilaterally.   Right femoral pulse: present 2+; Right popliteal pulse: absent Right DP: absent; Right PT absent; Left femoral pulse: present 2+; Left popliteal pulse: absent; Left DP: absent; Left PT: absent No cyanosis, clubbing, or significant edema. No signs atheroembolic event. Neurologic - alert and oriented X 3. Physiologic. Face symmetric. Skin - warm, dry, and intact. no wound  Psychiatric - mood, affect, and behavior appear normal.  Judgment and thought processes appear normal.    Risk factors for atherosclerosis of all vascular beds have been reviewed with the patient including:  Family history, tobacco abuse in all forms, elevated cholesterol, hyperlipidemia, and diabetes. Lower extremity arterial study: Right ROVERTO 1.18, Left ROVERTO 0.87. Individual films reviewed: yes. These results were reviewed with the patient. Disease process is stable and chronic  by review of waveforms, I see no significant change      Reviewed previous studies including: jefry  Individual images were reviewed. I agree with the findings  Results were discussed with the patient. ASSESSMENT/PLAN:  1. Atheroscler of native artery of both legs with intermit claudication ([address]  -     VL LOWER EXTREMITY ARTERIAL SEGMENTAL PRESSURES W PPG; Future  1. Atheroscler of native artery of both legs with intermit claudication ([address]         Discussed management of jefry which includes:  continue asa and crestor to reduce risk of arterial thrombosis and to decrease rate of plaque buildup  Strongly encourage start/continue statin therapy -   Recommend no smoking - discussed the effect tobacco has on illness;   Proceed with 6 months with jefry  Continue crestor and pletal and asa        Patient instructed to walk as much as possible. Call our office with any progressive pain in leg(s) or hip(s) with walking. Take good care of your feet. Let us know right away if you develop a wound on your foot. An electronic signature was used to authenticate this note.     --Jean-Paul Garner, MELIDA

## 2023-01-24 DIAGNOSIS — G47.00 INSOMNIA, UNSPECIFIED TYPE: ICD-10-CM

## 2023-01-24 RX ORDER — DOXEPIN HYDROCHLORIDE 25 MG/1
CAPSULE ORAL
Qty: 90 CAPSULE | Refills: 0 | Status: SHIPPED | OUTPATIENT
Start: 2023-01-24

## 2023-01-24 NOTE — TELEPHONE ENCOUNTER
Kelly Adriana called to request a refill on her medication.       Last office visit : 10/3/2022   Next office visit : Visit date not found     Requested Prescriptions     Pending Prescriptions Disp Refills    doxepin (SINEQUAN) 25 MG capsule [Pharmacy Med Name: DOXEPIN HCL 25 MG CAPS 25 Capsule] 90 capsule 0     Sig: TAKE ONE CAPSULE BY MOUTH EVERY NIGHT AT BEDTIME **MAY MAKE DROWSY**            Dylan Hassan MA

## 2023-03-11 DIAGNOSIS — B00.9 HERPES INFECTION: ICD-10-CM

## 2023-03-13 RX ORDER — VALACYCLOVIR HYDROCHLORIDE 1 G/1
1000 TABLET, FILM COATED ORAL 2 TIMES DAILY
Qty: 20 TABLET | Refills: 0 | Status: SHIPPED | OUTPATIENT
Start: 2023-03-13 | End: 2023-04-20 | Stop reason: SDUPTHER

## 2023-04-06 DIAGNOSIS — G47.00 INSOMNIA, UNSPECIFIED TYPE: ICD-10-CM

## 2023-04-06 DIAGNOSIS — M79.2 NERVE PAIN: ICD-10-CM

## 2023-04-06 DIAGNOSIS — F33.1 MODERATE EPISODE OF RECURRENT MAJOR DEPRESSIVE DISORDER (HCC): ICD-10-CM

## 2023-04-06 DIAGNOSIS — E53.8 FOLIC ACID DEFICIENCY: ICD-10-CM

## 2023-04-06 DIAGNOSIS — F41.1 GENERALIZED ANXIETY DISORDER: ICD-10-CM

## 2023-04-06 DIAGNOSIS — F41.8 SITUATIONAL ANXIETY: ICD-10-CM

## 2023-04-06 RX ORDER — IBUPROFEN 600 MG/1
TABLET ORAL
Qty: 270 TABLET | Refills: 0 | Status: SHIPPED | OUTPATIENT
Start: 2023-04-06

## 2023-04-06 RX ORDER — TIZANIDINE 4 MG/1
TABLET ORAL
Qty: 60 TABLET | Refills: 0 | Status: SHIPPED | OUTPATIENT
Start: 2023-04-06

## 2023-04-06 RX ORDER — ROSUVASTATIN CALCIUM 10 MG/1
10 TABLET, COATED ORAL DAILY
Qty: 30 TABLET | Refills: 0 | Status: SHIPPED | OUTPATIENT
Start: 2023-04-06

## 2023-04-06 RX ORDER — FUROSEMIDE 20 MG/1
20 TABLET ORAL 2 TIMES DAILY
Qty: 60 TABLET | Refills: 0 | Status: SHIPPED | OUTPATIENT
Start: 2023-04-06

## 2023-04-06 RX ORDER — LANOLIN ALCOHOL/MO/W.PET/CERES
400 CREAM (GRAM) TOPICAL DAILY
Qty: 30 TABLET | Refills: 0 | Status: SHIPPED | OUTPATIENT
Start: 2023-04-06

## 2023-04-06 RX ORDER — LORAZEPAM 0.5 MG/1
0.5 TABLET ORAL 3 TIMES DAILY PRN
Qty: 9 TABLET | Refills: 0 | OUTPATIENT
Start: 2023-04-06 | End: 2023-04-09

## 2023-04-06 RX ORDER — TRAZODONE HYDROCHLORIDE 100 MG/1
TABLET ORAL
Qty: 180 TABLET | Refills: 1 | Status: SHIPPED | OUTPATIENT
Start: 2023-04-06

## 2023-04-06 RX ORDER — FLUOXETINE HYDROCHLORIDE 40 MG/1
CAPSULE ORAL
Qty: 30 CAPSULE | Refills: 0 | Status: SHIPPED | OUTPATIENT
Start: 2023-04-06

## 2023-04-20 ENCOUNTER — OFFICE VISIT (OUTPATIENT)
Dept: FAMILY MEDICINE CLINIC | Age: 48
End: 2023-04-20
Payer: MEDICARE

## 2023-04-20 VITALS
WEIGHT: 130 LBS | HEART RATE: 94 BPM | OXYGEN SATURATION: 95 % | SYSTOLIC BLOOD PRESSURE: 138 MMHG | DIASTOLIC BLOOD PRESSURE: 82 MMHG | BODY MASS INDEX: 23.04 KG/M2 | TEMPERATURE: 98.4 F | HEIGHT: 63 IN

## 2023-04-20 DIAGNOSIS — B00.9 HERPES INFECTION: ICD-10-CM

## 2023-04-20 DIAGNOSIS — F43.21 GRIEF: ICD-10-CM

## 2023-04-20 DIAGNOSIS — A49.02 MRSA (METHICILLIN RESISTANT STAPHYLOCOCCUS AUREUS): Primary | ICD-10-CM

## 2023-04-20 PROCEDURE — 99213 OFFICE O/P EST LOW 20 MIN: CPT | Performed by: NURSE PRACTITIONER

## 2023-04-20 PROCEDURE — G8420 CALC BMI NORM PARAMETERS: HCPCS | Performed by: NURSE PRACTITIONER

## 2023-04-20 PROCEDURE — 4004F PT TOBACCO SCREEN RCVD TLK: CPT | Performed by: NURSE PRACTITIONER

## 2023-04-20 PROCEDURE — G8427 DOCREV CUR MEDS BY ELIG CLIN: HCPCS | Performed by: NURSE PRACTITIONER

## 2023-04-20 RX ORDER — CILOSTAZOL 100 MG/1
100 TABLET ORAL 2 TIMES DAILY
Qty: 60 TABLET | Refills: 5 | Status: SHIPPED | OUTPATIENT
Start: 2023-04-20

## 2023-04-20 RX ORDER — HYDROXYZINE PAMOATE 25 MG/1
25 CAPSULE ORAL 3 TIMES DAILY PRN
Qty: 90 CAPSULE | Refills: 1 | Status: SHIPPED | OUTPATIENT
Start: 2023-04-20 | End: 2023-05-04

## 2023-04-20 RX ORDER — VALACYCLOVIR HYDROCHLORIDE 1 G/1
1000 TABLET, FILM COATED ORAL 2 TIMES DAILY
Qty: 20 TABLET | Refills: 0 | OUTPATIENT
Start: 2023-04-20

## 2023-04-20 RX ORDER — DOXYCYCLINE HYCLATE 100 MG
100 TABLET ORAL 2 TIMES DAILY
Qty: 28 TABLET | Refills: 0 | Status: SHIPPED | OUTPATIENT
Start: 2023-04-20 | End: 2023-05-04

## 2023-04-20 RX ORDER — VALACYCLOVIR HYDROCHLORIDE 1 G/1
1000 TABLET, FILM COATED ORAL 2 TIMES DAILY
Qty: 20 TABLET | Refills: 0 | Status: SHIPPED | OUTPATIENT
Start: 2023-04-20

## 2023-04-20 ASSESSMENT — ENCOUNTER SYMPTOMS: COLOR CHANGE: 1

## 2023-04-20 NOTE — PROGRESS NOTES
SUBJECTIVE:  Comes in today for rash  Patient ID: Eusebio Melendrez is a 49 y.o. female. HPI:   HPI   Ms. Nael Gibbs comes in today and she is wearing a dress she says for the past 2 months been very uncomfortable in her pelvic region around her labia minora and also around her buttocks she been taking the Valtrex almost daily but it has not improved the rash that is on her bottom she complains of a lot of discomfort and pain she does have a history of MRSA as well as herpes and reviewed skin area it does not appear to be herpes but that is probably the reason why the Valtrex is not working she does have a pustule that is present and what looks to be a possible abscess that might have drained although it is nice and soft of the right now I feel like she probably has had some MRSA flareups were going to add doxycycline into the regiment I do not feel like the Valtrex is doing things that she can stop that  She is also wanting something for anxiety she takes something for her depression which is her Prozac and she takes trazodone for sleep and she is wanting something in addition she is caring for a friend who is now on hospice we talked a little bit about maybe doing BuSpar but she declined that we talked about maybe trying Vistaril for those as needed moments  Past Medical History:   Diagnosis Date    Allergic rhinitis     Cancer ([facility] [address]     cervical, lymphoma    Congenital heart disease     Mesenteric artery stenosis ([facility] [address]     MRSA infection     Stroke [facility])     after heart surgery 2013      Prior to Visit Medications    Medication Sig Taking?  Authorizing Provider   cilostazol (PLETAL) 100 MG tablet Take 1 tablet by mouth 2 times daily Yes MELIDA Hopkins   valACYclovir (VALTREX) 1 g tablet Take 1 tablet by mouth 2 times daily Yes MELIDA Hopkins   hydrOXYzine pamoate (VISTARIL) 25 MG capsule Take 1 capsule by mouth 3 times daily as needed for Itching Yes MELIDA Hopkins   doxycycline hyclate (VIBRA-TABS)

## 2023-04-21 ENCOUNTER — OFFICE VISIT (OUTPATIENT)
Dept: FAMILY MEDICINE CLINIC | Facility: CLINIC | Age: 48
End: 2023-04-21
Payer: MEDICARE

## 2023-04-21 VITALS
SYSTOLIC BLOOD PRESSURE: 190 MMHG | WEIGHT: 131 LBS | TEMPERATURE: 96.6 F | OXYGEN SATURATION: 99 % | HEIGHT: 62 IN | DIASTOLIC BLOOD PRESSURE: 94 MMHG | BODY MASS INDEX: 24.11 KG/M2 | RESPIRATION RATE: 20 BRPM | HEART RATE: 95 BPM

## 2023-04-21 DIAGNOSIS — J42 CHRONIC BRONCHITIS, UNSPECIFIED CHRONIC BRONCHITIS TYPE: ICD-10-CM

## 2023-04-21 DIAGNOSIS — R79.9 ABNORMAL FINDING OF BLOOD CHEMISTRY, UNSPECIFIED: ICD-10-CM

## 2023-04-21 DIAGNOSIS — L08.9 SKIN INFECTION: Primary | ICD-10-CM

## 2023-04-21 DIAGNOSIS — G89.4 CHRONIC PAIN SYNDROME: ICD-10-CM

## 2023-04-21 DIAGNOSIS — E78.2 MIXED HYPERLIPIDEMIA: ICD-10-CM

## 2023-04-21 DIAGNOSIS — F41.1 GAD (GENERALIZED ANXIETY DISORDER): ICD-10-CM

## 2023-04-21 DIAGNOSIS — E55.9 VITAMIN D DEFICIENCY: ICD-10-CM

## 2023-04-21 DIAGNOSIS — I10 PRIMARY HYPERTENSION: ICD-10-CM

## 2023-04-21 RX ORDER — OXYCODONE AND ACETAMINOPHEN 10; 325 MG/1; MG/1
1 TABLET ORAL EVERY 6 HOURS PRN
COMMUNITY

## 2023-04-21 RX ORDER — ROSUVASTATIN CALCIUM 10 MG/1
10 TABLET, COATED ORAL DAILY
COMMUNITY

## 2023-04-21 RX ORDER — VALACYCLOVIR HYDROCHLORIDE 1 G/1
TABLET, FILM COATED ORAL
COMMUNITY
Start: 2023-04-20

## 2023-04-21 RX ORDER — FLUOXETINE HYDROCHLORIDE 20 MG/1
40 CAPSULE ORAL DAILY
Qty: 60 CAPSULE | Refills: 0 | Status: SHIPPED | OUTPATIENT
Start: 2023-04-21

## 2023-04-21 RX ORDER — DOXYCYCLINE HYCLATE 100 MG
TABLET ORAL
COMMUNITY
Start: 2023-04-20

## 2023-04-21 RX ORDER — LANOLIN ALCOHOL/MO/W.PET/CERES
400 CREAM (GRAM) TOPICAL DAILY
COMMUNITY

## 2023-04-21 RX ORDER — HYDROXYZINE PAMOATE 25 MG/1
CAPSULE ORAL
COMMUNITY
Start: 2023-04-20

## 2023-04-21 RX ORDER — FLUOXETINE HYDROCHLORIDE 20 MG/1
40 CAPSULE ORAL DAILY
Qty: 60 CAPSULE | Refills: 0 | Status: SHIPPED | OUTPATIENT
Start: 2023-04-21 | End: 2023-04-21

## 2023-04-21 NOTE — PROGRESS NOTES
"       Chief Complaint  Establish Care    Subjective        Maday Fung presents to [facility]    HPI    Pt is here for acute concerns. She is new patient however considering switching to this clinic. Her main concern is w/ weeping, painful lesion/ulcer on L inner gluteal fold that comes and goes, outbreaks that may associate w/ stress. No drainage. No fever or constitutional sx. Has been present >1 year. Pt confused because she was told by a previous provider she had herpes but she isn't sure. Denies any labial, vaginal, or anal lesions or outbreaks. Reports not being sexually active in 3 years. She was also told she had MRSA and it could be related to that.     Pt also asking what options she has to help with anxiety acutely. Pt reports not having success w/ buspar or more recently vistaril.    PMH noted below. Of note pt previously treated for lymphoma >15 years ago and had surgical debridement/wound vac R inguinal region after lymph node dissection.      Past Medical History:   Diagnosis Date   • Cancer    • COPD (chronic obstructive pulmonary disease)    • Generalized anxiety disorder 1/25/2021   • H/O Ross procedure    • Hyperlipidemia    • Hypertension    • Insomnia, unspecified 1/25/2021   • Low back pain    • Pulmonary embolism    • Vitamin D deficiency 1/25/2021     Past Surgical History:   Procedure Laterality Date   • DILATATION AND CURETTAGE     • HYSTERECTOMY      Full   • ROSS KONNO PROCEDURE       Social History     Socioeconomic History   • Marital status: Single   Tobacco Use   • Smoking status: Every Day   • Smokeless tobacco: Never   Substance and Sexual Activity   • Alcohol use: Never   • Drug use: Never   • Sexual activity: Defer       Objective   Vital Signs:  BP (!) 190/94 (BP Location: Right arm, Patient Position: Sitting, Cuff Size: Adult)   Pulse 95   Temp 96.6 °F (35.9 °C) (Temporal)   Resp 20   Ht 157.5 cm (62.01\")   Wt 59.4 kg (131 lb)   SpO2 99%  " " BMI 23.95 kg/m²   Estimated body mass index is 23.95 kg/m² as calculated from the following:    Height as of this encounter: 157.5 cm (62.01\").    Weight as of this encounter: 59.4 kg (131 lb).             Physical Exam  Vitals reviewed. Exam conducted with a chaperone present.   Constitutional:       Appearance: Normal appearance.   HENT:      Head: Normocephalic.      Nose: Nose normal.      Mouth/Throat:      Mouth: Mucous membranes are moist.   Eyes:      Extraocular Movements: Extraocular movements intact.   Cardiovascular:      Rate and Rhythm: Normal rate and regular rhythm.      Heart sounds: Normal heart sounds.   Pulmonary:      Effort: Pulmonary effort is normal.      Breath sounds: Normal breath sounds.   Musculoskeletal:         General: Normal range of motion.      Cervical back: Normal range of motion.   Skin:     General: Skin is warm and dry.             Comments: 2cm well-circumscribed lesion L inner gluteal fold. Appearance is superficial ulceration. No induration or drainage. Is TTP.   Neurological:      General: No focal deficit present.      Mental Status: She is alert and oriented to person, place, and time.   Psychiatric:         Mood and Affect: Mood normal.         Behavior: Behavior normal.        Result Review :                   Assessment and Plan   Diagnoses and all orders for this visit:    1. Skin infection (Primary)  -Unclear etiology. Appearance and history suggests some kind of recurring ulcerative lesion. Given there was scant amount of weeping superficially I swabbed lesion and will send off to check for MRSA and other bacterial growth, HSV. May need referral to wound care/dermatology.  -     Herpes Simplex Virus Culture - Swab, Thigh, Left; Future  -     Anaerobic & Aerobic Culture ([facility] Only) - Swab, Thigh, Left; Future  -     Anaerobic & Aerobic Culture ([facility] Only) - Swab, Thigh, Left  -     Herpes Simplex Virus Culture - Swab, Thigh, Left    2. [place] (generalized " anxiety disorder)  -Titrate up SSRI dose  -     FLUoxetine (PROzac) 20 MG capsule; Take 2 capsules by mouth Daily.  Dispense: 60 capsule; Refill: 0    3. Mixed hyperlipidemia  -     Lipid Panel With LDL/HDL Ratio; Future  -     Hemoglobin A1c; Future  -     TSH Rfx On Abnormal To Free T4; Future    4. Chronic bronchitis, unspecified chronic bronchitis type    5. Chronic pain syndrome    6. Vitamin D deficiency  -     Vitamin D 25 hydroxy; Future    7. Primary hypertension  -     Comprehensive metabolic panel; Future  -     Microalbumin / Creatinine Urine Ratio - Urine, Clean Catch; Future    8. Abnormal finding of blood chemistry, unspecified  -     Hemoglobin A1c; Future    Other orders  -     Specimen Status Report           I spent 45 minutes caring for Maday on this date of service. This time includes time spent by me in the following activities:preparing for the visit, performing a medically appropriate examination and/or evaluation , counseling and educating the patient/family/caregiver, ordering medications, tests, or procedures, documenting information in the medical record and care coordination  EMR Dragon/Transcription disclaimer:   Much of this encounter note is an electronic transcription/translation of spoken language to printed text. The electronic translation of spoken language may permit erroneous, or at times, nonsensical words or phrases to be inadvertently transcribed; although attempts have made to review the note for such errors, some may still exist. Please excuse any unrecognized transcription errors and contact us if the air is unintelligible or needs documented correction. Also, portions of this note have been copied forward, however, changed to reflect the most current clinical status of this patient.  Follow Up   Return in about 11 days (around 5/2/2023).  Patient was given instructions and counseling regarding her condition or for health maintenance advice. Please see specific information  pulled into the AVS if appropriate.

## 2023-04-22 LAB — SPECIMEN STATUS: NORMAL

## 2023-04-25 LAB
BACTERIA SPEC AEROBE CULT: ABNORMAL
BACTERIA SPEC ANAEROBE CULT: ABNORMAL
BACTERIA SPEC CULT: ABNORMAL
BACTERIA SPEC CULT: ABNORMAL
OTHER ANTIBIOTIC SUSC ISLT: ABNORMAL

## 2023-04-26 DIAGNOSIS — Z22.322 MRSA (METHICILLIN RESISTANT STAPH AUREUS) CULTURE POSITIVE: Primary | ICD-10-CM

## 2023-04-26 RX ORDER — CLINDAMYCIN HYDROCHLORIDE 300 MG/1
300 CAPSULE ORAL 4 TIMES DAILY
Qty: 56 CAPSULE | Refills: 0 | Status: SHIPPED | OUTPATIENT
Start: 2023-04-26 | End: 2023-04-28 | Stop reason: SDUPTHER

## 2023-04-26 RX ORDER — CHLORHEXIDINE GLUCONATE 4 G/100ML
SOLUTION TOPICAL DAILY
Qty: 946 ML | Refills: 0 | Status: SHIPPED | OUTPATIENT
Start: 2023-04-26 | End: 2023-04-28 | Stop reason: SDUPTHER

## 2023-04-27 ENCOUNTER — CLINICAL SUPPORT (OUTPATIENT)
Dept: FAMILY MEDICINE CLINIC | Facility: CLINIC | Age: 48
End: 2023-04-27
Payer: MEDICARE

## 2023-04-27 DIAGNOSIS — Z20.828 HERPES EXPOSURE: Primary | ICD-10-CM

## 2023-04-27 NOTE — PROGRESS NOTES
Patient came in to get a HSV swab done Per Dr. Meza  This was completed and sent to [facility].

## 2023-04-28 ENCOUNTER — TELEPHONE (OUTPATIENT)
Dept: FAMILY MEDICINE CLINIC | Facility: CLINIC | Age: 48
End: 2023-04-28

## 2023-04-28 DIAGNOSIS — Z22.322 MRSA (METHICILLIN RESISTANT STAPH AUREUS) CULTURE POSITIVE: ICD-10-CM

## 2023-04-28 RX ORDER — CHLORHEXIDINE GLUCONATE 4 G/100ML
SOLUTION TOPICAL DAILY
Qty: 946 ML | Refills: 0 | Status: SHIPPED | OUTPATIENT
Start: 2023-04-28 | End: 2023-05-12

## 2023-04-28 RX ORDER — CLINDAMYCIN HYDROCHLORIDE 300 MG/1
300 CAPSULE ORAL 4 TIMES DAILY
Qty: 56 CAPSULE | Refills: 0 | Status: SHIPPED | OUTPATIENT
Start: 2023-04-28 | End: 2023-05-12

## 2023-04-28 NOTE — TELEPHONE ENCOUNTER
Patient stopped by office. She stated only 3 prescriptions were called into pharmacy. She stated they were called into wrong pharmacy and was told that would be fixed yesterday. She also stated there was to be 4 prescriptions. She is scheduled for an appointment today at 1:00 with Dr. Meza.

## 2023-04-28 NOTE — TELEPHONE ENCOUNTER
Caller: Maday Fung    Relationship: Self    Best call back number: [phone]    ALL PRESCRIPTIONS SENT TO [facility] SHOULD BE SENT TO [facility]  Pharmacy where request should be sent: [facility] - [place], [place] - [address] [phone] [facility] [phone]      Last office visit with prescribing clinician: 4/21/2023   Last telemedicine visit with prescribing clinician: 5/3/2023   Next office visit with prescribing clinician: 5/3/2023     Additional details provided by patient: PATIENT WAS VERY UPSET---I TRIED TO UNDERSTAND WHAT SHE WAS SAYING; LATER IN THE CONVERSATION SHE SAID HER BLOOD PRESSURE WAS HIGH-TRIED TO TRANSFER TO OFFICE OR TRIAGE NURSE-SHE REFUSED    SHE SAID SHE WILL STOP BY THE OFFICE LATER ON    Would you like a call back once the refill request has been completed: [x] Yes [] No    If the office needs to give you a call back, can they leave a voicemail: [] Yes [] No    Patrizia Guevara Rep   04/28/23 11:28 CDT

## 2023-04-30 LAB — HSV SPEC CULT: NEGATIVE

## 2023-05-01 ENCOUNTER — CLINICAL SUPPORT (OUTPATIENT)
Dept: FAMILY MEDICINE CLINIC | Facility: CLINIC | Age: 48
End: 2023-05-01
Payer: MEDICARE

## 2023-05-01 VITALS — DIASTOLIC BLOOD PRESSURE: 80 MMHG | SYSTOLIC BLOOD PRESSURE: 118 MMHG

## 2023-05-01 DIAGNOSIS — I10 HYPERTENSION, UNSPECIFIED TYPE: Primary | ICD-10-CM

## 2023-05-01 RX ORDER — CLONIDINE HYDROCHLORIDE 0.1 MG/1
0.1 TABLET ORAL 2 TIMES DAILY
Qty: 30 TABLET | Refills: 1 | Status: SHIPPED | OUTPATIENT
Start: 2023-05-01

## 2023-05-01 RX ORDER — LANCETS 23 GAUGE
EACH MISCELLANEOUS
Qty: 200 EACH | Refills: 12 | Status: SHIPPED | OUTPATIENT
Start: 2023-05-01

## 2023-05-01 RX ORDER — BLOOD-GLUCOSE METER
1 EACH MISCELLANEOUS 2 TIMES DAILY
Qty: 1 EACH | Refills: 0 | Status: SHIPPED | OUTPATIENT
Start: 2023-05-01

## 2023-05-01 NOTE — PROGRESS NOTES
I took BP in the office it is 118/80 but states she took a pain med a hour ago she states the pain med usually makes the BP come down but when this wears off the BP will shoot up She states she see's Dr. Powell in [place] for Cardiology and is a little behind on her visit ;

## 2023-05-17 DIAGNOSIS — G47.00 INSOMNIA, UNSPECIFIED TYPE: ICD-10-CM

## 2023-05-17 NOTE — TELEPHONE ENCOUNTER
Elly Banuelos called to request a refill on her medication.       Last office visit : 4/20/2023   Next office visit : Visit date not found     Requested Prescriptions     Pending Prescriptions Disp Refills    doxepin (SINEQUAN) 25 MG capsule 90 capsule 0     Sig: TAKE ONE CAPSULE BY MOUTH EVERY NIGHT AT BEDTIME **MAY MAKE DROWSY**            David Pate [place]

## 2023-05-18 RX ORDER — DOXEPIN HYDROCHLORIDE 25 MG/1
CAPSULE ORAL
Qty: 90 CAPSULE | Refills: 1 | Status: SHIPPED | OUTPATIENT
Start: 2023-05-18

## 2023-06-08 ENCOUNTER — TELEPHONE (OUTPATIENT)
Dept: FAMILY MEDICINE CLINIC | Age: 48
End: 2023-06-08

## 2023-06-08 ENCOUNTER — TELEPHONE (OUTPATIENT)
Dept: FAMILY MEDICINE CLINIC | Facility: CLINIC | Age: 48
End: 2023-06-08
Payer: MEDICARE

## 2023-06-08 DIAGNOSIS — J44.1 CHRONIC OBSTRUCTIVE PULMONARY DISEASE WITH ACUTE EXACERBATION (HCC): ICD-10-CM

## 2023-06-08 DIAGNOSIS — F33.1 MODERATE EPISODE OF RECURRENT MAJOR DEPRESSIVE DISORDER (HCC): ICD-10-CM

## 2023-06-08 DIAGNOSIS — B00.9 HERPES INFECTION: ICD-10-CM

## 2023-06-08 DIAGNOSIS — J45.41 MODERATE PERSISTENT ASTHMA WITH ACUTE EXACERBATION: ICD-10-CM

## 2023-06-08 DIAGNOSIS — G47.00 INSOMNIA, UNSPECIFIED TYPE: ICD-10-CM

## 2023-06-08 DIAGNOSIS — E53.8 FOLIC ACID DEFICIENCY: ICD-10-CM

## 2023-06-08 DIAGNOSIS — I10 ESSENTIAL HYPERTENSION: ICD-10-CM

## 2023-06-08 RX ORDER — LOSARTAN POTASSIUM 50 MG/1
TABLET ORAL
Qty: 30 TABLET | Refills: 5 | OUTPATIENT
Start: 2023-06-08

## 2023-06-08 RX ORDER — ASPIRIN 81 MG/1
81 TABLET ORAL DAILY
Qty: 90 TABLET | Refills: 0 | OUTPATIENT
Start: 2023-06-08 | End: 2023-09-06

## 2023-06-08 RX ORDER — FLUOXETINE HYDROCHLORIDE 40 MG/1
40 CAPSULE ORAL DAILY
Qty: 30 CAPSULE | Refills: 0 | OUTPATIENT
Start: 2023-06-08

## 2023-06-08 RX ORDER — FUROSEMIDE 20 MG/1
20 TABLET ORAL 2 TIMES DAILY
Qty: 60 TABLET | Refills: 0 | OUTPATIENT
Start: 2023-06-08

## 2023-06-08 RX ORDER — ALBUTEROL SULFATE 90 UG/1
2 AEROSOL, METERED RESPIRATORY (INHALATION) 4 TIMES DAILY PRN
Qty: 1 EACH | Refills: 5 | OUTPATIENT
Start: 2023-06-08

## 2023-06-08 RX ORDER — CILOSTAZOL 100 MG/1
100 TABLET ORAL 2 TIMES DAILY
Qty: 60 TABLET | Refills: 5 | OUTPATIENT
Start: 2023-06-08

## 2023-06-08 RX ORDER — ALBUTEROL SULFATE 90 UG/1
2 AEROSOL, METERED RESPIRATORY (INHALATION) EVERY 6 HOURS PRN
Qty: 18 G | Refills: 5 | OUTPATIENT
Start: 2023-06-08

## 2023-06-08 RX ORDER — VALACYCLOVIR HYDROCHLORIDE 1 G/1
1000 TABLET, FILM COATED ORAL 2 TIMES DAILY
Qty: 20 TABLET | Refills: 0 | OUTPATIENT
Start: 2023-06-08

## 2023-06-08 RX ORDER — FLUTICASONE PROPIONATE AND SALMETEROL XINAFOATE 115; 21 UG/1; UG/1
2 AEROSOL, METERED RESPIRATORY (INHALATION) 2 TIMES DAILY
Qty: 1 EACH | Refills: 5 | OUTPATIENT
Start: 2023-06-08

## 2023-06-08 RX ORDER — ALBUTEROL SULFATE 1.25 MG/3ML
1 SOLUTION RESPIRATORY (INHALATION) EVERY 6 HOURS PRN
Qty: 360 ML | Refills: 3 | OUTPATIENT
Start: 2023-06-08

## 2023-06-08 RX ORDER — TIZANIDINE 4 MG/1
TABLET ORAL
Qty: 60 TABLET | Refills: 0 | OUTPATIENT
Start: 2023-06-08

## 2023-06-08 RX ORDER — LANOLIN ALCOHOL/MO/W.PET/CERES
400 CREAM (GRAM) TOPICAL DAILY
Qty: 30 TABLET | Refills: 0 | OUTPATIENT
Start: 2023-06-08

## 2023-06-08 RX ORDER — FUROSEMIDE 20 MG/1
TABLET ORAL
Qty: 60 TABLET | Refills: 0 | OUTPATIENT
Start: 2023-06-08

## 2023-06-08 RX ORDER — TRAZODONE HYDROCHLORIDE 100 MG/1
TABLET ORAL
Qty: 180 TABLET | Refills: 1 | OUTPATIENT
Start: 2023-06-08

## 2023-06-08 RX ORDER — ROSUVASTATIN CALCIUM 10 MG/1
10 TABLET, COATED ORAL DAILY
Qty: 90 TABLET | Refills: 0 | OUTPATIENT
Start: 2023-06-08 | End: 2023-09-06

## 2023-06-08 NOTE — TELEPHONE ENCOUNTER
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

## 2023-06-08 NOTE — TELEPHONE ENCOUNTER
Pt called stating she is out of refills on her Fureosemide, Fluoxetine, Rosuvastatin, and Tizannide and is needing a new prescription for all off those to be sent to [facility].

## 2023-06-08 NOTE — TELEPHONE ENCOUNTER
Amee Ware called to request a refill on her medication.       Last office visit : 4/20/2023   Next office visit : 6/8/2023     Requested Prescriptions     Pending Prescriptions Disp Refills    cilostazol (PLETAL) 100 MG tablet 60 tablet 5     Sig: Take 1 tablet by mouth 2 times daily    valACYclovir (VALTREX) 1 g tablet 20 tablet 0     Sig: Take 1 tablet by mouth 2 times daily    folic acid (FOLVITE) 247 MCG tablet 30 tablet 0     Sig: Take 1 tablet by mouth daily    furosemide (LASIX) 20 MG tablet 60 tablet 0     Sig: Take 1 tablet by mouth 2 times daily    traZODone (DESYREL) 100 MG tablet 180 tablet 1     Sig: TAKE TWO TABLETS BY MOUTH EVERY NIGHT AT BEDTIME AS NEEDED FOR SLEEP ** MAY CAUSE DROWSINESS **    rosuvastatin (CRESTOR) 10 MG tablet 90 tablet 0     Sig: Take 1 tablet by mouth daily    FLUoxetine (PROZAC) 40 MG capsule 30 capsule 0     Sig: Take 1 capsule by mouth daily    tiZANidine (ZANAFLEX) 4 MG tablet 60 tablet 0     Sig: TAKE ONE TABLET BY MOUTH THREE TIMES DAILY AS NEEDED FOR HIP AND BACK PAIN    albuterol sulfate HFA (PROAIR HFA) 108 (90 Base) MCG/ACT inhaler 18 g 5     Sig: Inhale 2 puffs into the lungs every 6 hours as needed for Wheezing    albuterol (ACCUNEB) 1.25 MG/3ML nebulizer solution 360 mL 3     Sig: Inhale 3 mLs into the lungs every 6 hours as needed for Wheezing    fluticasone-salmeterol (ADVAIR HFA) 115-21 MCG/ACT inhaler 1 each 5     Sig: Inhale 2 puffs into the lungs 2 times daily    albuterol sulfate HFA (VENTOLIN HFA) 108 (90 Base) MCG/ACT inhaler 1 each 5     Sig: Inhale 2 puffs into the lungs 4 times daily as needed for Wheezing    losartan (COZAAR) 50 MG tablet 30 tablet 5     Sig: Take 1/2 (one-half) tablet by mouth once daily    aspirin 81 MG EC tablet 90 tablet 0     Sig: Take 1 tablet by mouth daily            Ninoska Martins MA

## 2023-06-08 NOTE — TELEPHONE ENCOUNTER
I called pt  schedule her an appointment but she refused. She said she is still sick with MRSA and a doctor she saw this for gave her 4 different medications. She said she is not able to drive anywhere and was wondering if you could send in the medications and she will make an appointment once she feels better.

## 2023-06-08 NOTE — TELEPHONE ENCOUNTER
I called patient to see who is prescribing her the medication and she said you were. She then proceeded to tell me that she was going to find another doctor because \"it shouldn't be this big of a deal to get my medication. \"

## 2023-06-09 ENCOUNTER — TELEPHONE (OUTPATIENT)
Dept: FAMILY MEDICINE CLINIC | Age: 48
End: 2023-06-09

## 2023-06-09 DIAGNOSIS — F41.1 GAD (GENERALIZED ANXIETY DISORDER): ICD-10-CM

## 2023-06-09 RX ORDER — TIZANIDINE 4 MG/1
4 TABLET ORAL EVERY 6 HOURS PRN
Qty: 30 TABLET | Refills: 0 | Status: SHIPPED | OUTPATIENT
Start: 2023-06-09

## 2023-06-09 RX ORDER — TIZANIDINE 4 MG/1
4 TABLET ORAL
COMMUNITY
Start: 2021-12-02 | End: 2023-06-09 | Stop reason: SDUPTHER

## 2023-06-09 RX ORDER — FUROSEMIDE 20 MG/1
20 TABLET ORAL DAILY
Qty: 30 TABLET | Refills: 0 | Status: SHIPPED | OUTPATIENT
Start: 2023-06-09

## 2023-06-09 RX ORDER — ROSUVASTATIN CALCIUM 10 MG/1
10 TABLET, COATED ORAL DAILY
Qty: 30 TABLET | Refills: 0 | Status: SHIPPED | OUTPATIENT
Start: 2023-06-09

## 2023-06-09 RX ORDER — FLUOXETINE HYDROCHLORIDE 20 MG/1
40 CAPSULE ORAL DAILY
Qty: 60 CAPSULE | Refills: 0 | Status: SHIPPED | OUTPATIENT
Start: 2023-06-09

## 2023-06-09 NOTE — TELEPHONE ENCOUNTER
I called patient to let her know you wanted to have a follow up I pended all the medication for only 30days with a note stating pt must have appt before further refills ..

## 2023-06-21 PROBLEM — Z87.410 HISTORY OF CERVICAL DYSPLASIA: Status: ACTIVE | Noted: 2023-06-21

## 2023-06-21 PROBLEM — R73.03 PREDIABETES: Status: ACTIVE | Noted: 2023-06-21

## 2023-07-20 ENCOUNTER — TELEPHONE (OUTPATIENT)
Dept: VASCULAR SURGERY | Age: 48
End: 2023-07-20

## 2023-07-21 ENCOUNTER — TELEPHONE (OUTPATIENT)
Dept: VASCULAR SURGERY | Age: 48
End: 2023-07-21

## 2023-07-26 RX ORDER — TIZANIDINE 4 MG/1
TABLET ORAL
Qty: 30 TABLET | Refills: 0 | Status: SHIPPED | OUTPATIENT
Start: 2023-07-26

## 2023-07-26 NOTE — TELEPHONE ENCOUNTER
Rx Refill Note  Requested Prescriptions     Pending Prescriptions Disp Refills    tiZANidine (ZANAFLEX) 4 MG tablet [Pharmacy Med Name: TIZANIDINE HCL 4MG TABS] 30 tablet 0     Sig: TAKE ONE TABLET BY MOUTH EVERY 6 HOURS AS NEEDED FOR MUSCLE SPASMS. WILL NEED APPOINTMENT BEFORE FURTHER REFILLS      Last office visit with prescribing clinician: 6/20/2023         Tess Dietz MA  07/26/23, 09:36 CDT

## 2023-07-28 RX ORDER — CLONIDINE HYDROCHLORIDE 0.1 MG/1
TABLET ORAL
Qty: 30 TABLET | Refills: 1 | Status: SHIPPED | OUTPATIENT
Start: 2023-07-28

## 2023-08-18 NOTE — TELEPHONE ENCOUNTER
Rx Refill Note  Requested Prescriptions     Pending Prescriptions Disp Refills    cloNIDine (CATAPRES) 0.1 MG tablet [Pharmacy Med Name: CLONIDINE HCL 0.1MG TABS] 30 tablet 1     Sig: TAKE ONE TABLET BY MOUTH TWICE A DAY AS NEEDED     Lilia Giron MA  08/18/23, 15:56 CDT

## 2023-08-22 RX ORDER — CLONIDINE HYDROCHLORIDE 0.1 MG/1
TABLET ORAL
Qty: 30 TABLET | Refills: 1 | Status: SHIPPED | OUTPATIENT
Start: 2023-08-22

## 2023-08-22 NOTE — TELEPHONE ENCOUNTER
Rx Refill Note  Requested Prescriptions     Pending Prescriptions Disp Refills    cloNIDine (CATAPRES) 0.1 MG tablet [Pharmacy Med Name: CLONIDINE HCL 0.1MG TABS] 30 tablet 1     Sig: TAKE ONE TABLET BY MOUTH TWICE A DAY AS NEEDED      Last office visit with prescribing clinician: 6/20/2023   Last telemedicine visit with prescribing clinician: Visit date not found   Next office visit with prescribing clinician: 9/20/2023         Protocol met        Rhea Arreaga MA  08/22/23, 07:36 CDT

## 2023-08-22 NOTE — TELEPHONE ENCOUNTER
Caller: Maday Fung    Relationship: Self    Best call back number: [phone]     Requested Prescriptions:   Requested Prescriptions     Pending Prescriptions Disp Refills    doxycycline (VIBRAMYICN) 100 MG tablet          Pharmacy where request should be sent: [facility] [place], [place] - [address] [phone] [facility] [phone] [facility]     Last office visit with prescribing clinician: 6/20/2023   Last telemedicine visit with prescribing clinician: Visit date not found   Next office visit with prescribing clinician: 9/20/2023     Additional details provided by patient: PATIENT SAID SHE IS HAVING ANOTHER MRSA OUTBREAK AND WAS TOLD THAT IN THE PAST THAT IF SHE HAS ANY MORE TO CALL AND THEY WOULD CALL THE ANTIBIOTIC IN FOR HER.     Does the patient have less than a 3 day supply:  [x] Yes  [] No    Would you like a call back once the refill request has been completed: [x] Yes [] No      Patrizia Garland Rep   08/22/23 11:53 CDT

## 2023-08-23 RX ORDER — DOXYCYCLINE HYCLATE 100 MG
TABLET ORAL
OUTPATIENT
Start: 2023-08-23

## 2023-08-24 ENCOUNTER — OFFICE VISIT (OUTPATIENT)
Dept: FAMILY MEDICINE CLINIC | Facility: CLINIC | Age: 48
End: 2023-08-24
Payer: MEDICARE

## 2023-08-24 VITALS
SYSTOLIC BLOOD PRESSURE: 120 MMHG | HEIGHT: 62 IN | OXYGEN SATURATION: 98 % | DIASTOLIC BLOOD PRESSURE: 80 MMHG | TEMPERATURE: 97.8 F | RESPIRATION RATE: 20 BRPM | HEART RATE: 60 BPM | WEIGHT: 130 LBS | BODY MASS INDEX: 23.92 KG/M2

## 2023-08-24 DIAGNOSIS — Z22.322 MRSA (METHICILLIN RESISTANT STAPH AUREUS) CULTURE POSITIVE: Primary | ICD-10-CM

## 2023-08-24 PROCEDURE — 99213 OFFICE O/P EST LOW 20 MIN: CPT | Performed by: STUDENT IN AN ORGANIZED HEALTH CARE EDUCATION/TRAINING PROGRAM

## 2023-08-24 RX ORDER — CHLORHEXIDINE GLUCONATE 4 G/100ML
SOLUTION TOPICAL DAILY PRN
Qty: 3790 ML | Refills: 1 | Status: SHIPPED | OUTPATIENT
Start: 2023-08-24

## 2023-08-24 RX ORDER — CLINDAMYCIN HYDROCHLORIDE 300 MG/1
300 CAPSULE ORAL 4 TIMES DAILY
Qty: 56 CAPSULE | Refills: 1 | Status: SHIPPED | OUTPATIENT
Start: 2023-08-24 | End: 2023-09-07

## 2023-08-24 RX ORDER — MUPIROCIN CALCIUM 20 MG/G
1 CREAM TOPICAL 3 TIMES DAILY
Qty: 30 G | Refills: 1 | Status: SHIPPED | OUTPATIENT
Start: 2023-08-24
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
## 2023-08-25 ENCOUNTER — TELEPHONE (OUTPATIENT)
Dept: VASCULAR SURGERY | Age: 48
End: 2023-08-25

## 2023-08-25 NOTE — TELEPHONE ENCOUNTER
Unruly Rodrigues requests that office return their call in regards to missed appt reschedule, asking if testing needs to be scheduled as well. The best time to reach her is Anytime [phone]. Thank you.

## 2023-08-25 NOTE — TELEPHONE ENCOUNTER
Called and lvm with patient stating that she does need an SIGRID before her appointment and to call back to schedule.

## 2023-09-11 ENCOUNTER — HOSPITAL ENCOUNTER (OUTPATIENT)
Dept: NON INVASIVE DIAGNOSTICS | Age: 48
Discharge: HOME | End: 2023-09-11
Payer: MEDICARE

## 2023-09-11 DIAGNOSIS — I70.213 ATHEROSCLER OF NATIVE ARTERY OF BOTH LEGS WITH INTERMIT CLAUDICATION (HCC): ICD-10-CM

## 2023-09-11 PROCEDURE — 93923 UPR/LXTR ART STDY 3+ LVLS: CPT

## 2023-09-18 ENCOUNTER — TELEPHONE (OUTPATIENT)
Dept: VASCULAR SURGERY | Age: 48
End: 2023-09-18

## 2023-09-18 NOTE — TELEPHONE ENCOUNTER
Called and spoke to the patient to schedule her for a follow up appt with [facility] to discuss results of her vascular testing. I have the patient scheduled for 09/28/2023.

## 2023-09-19 ENCOUNTER — TELEPHONE (OUTPATIENT)
Dept: FAMILY MEDICINE CLINIC | Facility: CLINIC | Age: 48
End: 2023-09-19
Payer: MEDICARE

## 2023-09-19 DIAGNOSIS — F40.243 FEAR OF FLYING: Primary | ICD-10-CM

## 2023-09-19 DIAGNOSIS — F17.200 TOBACCO DEPENDENCE: ICD-10-CM

## 2023-09-19 NOTE — TELEPHONE ENCOUNTER
Caller: Maday Fung    Relationship: Self    Best call back number: [phone]     What medication are you requesting: SOMETHING TO EASE HER ANXIETY     What are your current symptoms: VERY NERVOUS ABOUT FLYING     How long have you been experiencing symptoms: THIS IS THE 1ST TIME SHE HAS FLOWN     Have you had these symptoms before:    [x] Yes  [] No    Have you been treated for these symptoms before:   [x] Yes  [] No    If a prescription is needed, what is your preferred pharmacy and phone number:    [facility] [place], [place] - [address] [phone] [facility] [phone]      Additional notes:  SHE IS FLYING FOR THE FIRST TIME TOMORROW

## 2023-09-19 NOTE — TELEPHONE ENCOUNTER
Rx Refill Note  Requested Prescriptions     Pending Prescriptions Disp Refills    buPROPion SR (WELLBUTRIN SR) 150 MG 12 hr tablet [Pharmacy Med Name: BUPROPION HCL ER (SR) 150MG TB12] 60 tablet 1     Sig: TAKE ONE TABLET BY MOUTH TWICE A DAY    cloNIDine (CATAPRES) 0.1 MG tablet [Pharmacy Med Name: CLONIDINE HCL 0.1MG TABS] 30 tablet 1     Sig: TAKE ONE TABLET BY MOUTH TWICE A DAY AS NEEDED          Lilia Giron MA  09/19/23, 11:57 CDT

## 2023-09-20 RX ORDER — CLONIDINE HYDROCHLORIDE 0.1 MG/1
TABLET ORAL
Qty: 90 TABLET | Refills: 2 | Status: SHIPPED | OUTPATIENT
Start: 2023-09-20

## 2023-09-20 RX ORDER — BUPROPION HYDROCHLORIDE 150 MG/1
TABLET, EXTENDED RELEASE ORAL
Qty: 60 TABLET | Refills: 2 | Status: SHIPPED | OUTPATIENT
Start: 2023-09-20

## 2023-09-20 RX ORDER — DIAZEPAM 10 MG/1
10 TABLET ORAL ONCE
Qty: 1 TABLET | Refills: 0 | Status: SHIPPED | OUTPATIENT
Start: 2023-09-20 | End: 2023-09-20

## 2023-09-20 NOTE — TELEPHONE ENCOUNTER
Rx Refill Note  Requested Prescriptions     Pending Prescriptions Disp Refills    buPROPion SR (WELLBUTRIN SR) 150 MG 12 hr tablet [Pharmacy Med Name: BUPROPION HCL ER (SR) 150MG TB12] 60 tablet 1     Sig: TAKE ONE TABLET BY MOUTH TWICE A DAY    cloNIDine (CATAPRES) 0.1 MG tablet [Pharmacy Med Name: CLONIDINE HCL 0.1MG TABS] 30 tablet 1     Sig: TAKE ONE TABLET BY MOUTH TWICE A DAY AS NEEDED      Last office visit with prescribing clinician: 8/24/2023   Last telemedicine visit with prescribing clinician: Visit date not found   Next office visit with prescribing clinician: Visit date not found       Protocol met        hRea Arreaga MA  09/20/23, 07:51 CDT

## 2023-09-22 ENCOUNTER — TELEPHONE (OUTPATIENT)
Dept: FAMILY MEDICINE CLINIC | Facility: CLINIC | Age: 48
End: 2023-09-22
Payer: MEDICARE

## 2023-09-22 NOTE — TELEPHONE ENCOUNTER
Pt called into the office today requesting we send in er medication for anxiety due to having to fly over to the [facility] pharmacy, for when pt has to fly back in four days. Pt stated pharmacy called her when she was already in [place] that her medication was ready. I informed pt that I would send over the request and the office would inform her if this could be completed.  Please advice request

## 2023-09-26 DIAGNOSIS — F40.243 FEAR OF FLYING: Primary | ICD-10-CM

## 2023-09-26 RX ORDER — DIAZEPAM 10 MG/1
10 TABLET ORAL ONCE
Qty: 1 TABLET | Refills: 0 | Status: SHIPPED | OUTPATIENT
Start: 2023-09-26 | End: 2023-09-26

## 2023-09-26 RX ORDER — FLUTICASONE PROPIONATE AND SALMETEROL XINAFOATE 115; 21 UG/1; UG/1
AEROSOL, METERED RESPIRATORY (INHALATION)
Qty: 12 G | Refills: 2 | Status: SHIPPED | OUTPATIENT
Start: 2023-09-26

## 2023-09-26 NOTE — PROGRESS NOTES
bilaterally. Right femoral pulse: present 2+; Right popliteal pulse: absent Right DP: absent; Right PT present 2+; Left femoral pulse: present 2+; Left popliteal pulse: absent; Left DP: absent; Left PT: present 2+ No cyanosis, clubbing, or significant edema. No signs atheroembolic event. Neurologic - alert and oriented X 3. Physiologic. Face symmetric. Skin - warm, dry, and intact. no wound  Psychiatric - mood, affect, and behavior appear normal.  Judgment and thought processes appear normal.    Risk factors for atherosclerosis of all vascular beds have been reviewed with the patient including:  Family history, tobacco abuse in all forms, elevated cholesterol, hyperlipidemia, and diabetes. Lower extremity arterial study: Right ROVERTO .0.8, Left ROVERTO 0.78. Individual films reviewed: Yes. These results were reviewed with the patient. Disease process is chronic illness with exacerbation, progression, or side effects of treatment  by review of waveforms, I see mild progression of disease        Reviewed previous studies including: jefry  Individual images were reviewed. I agree with the findings  Results were discussed with the patient. ASSESSMENT/PLAN:  1. Atheroscler of native artery of both legs with intermit claudication ([address])    1. Atheroscler of native artery of both legs with intermit claudication ([address])     chronic illness with exacerbation, progression, or side effects of treatment    Discussed management of jefry which includes:  continue asa, pletal, and crestor to reduce risk of arterial thrombosis and to decrease rate of plaque buildup  Strongly encourage start/continue statin therapy -   Recommend no smoking - discussed the effect tobacco has on illness;   Proceed with 6 months with jefry          Patient instructed to walk as much as possible. Call our office with any progressive pain in leg(s) or hip(s) with walking. Take good care of your feet.   Let us know right away if you develop a wound

## 2023-09-26 NOTE — TELEPHONE ENCOUNTER
Rx Refill Note  Requested Prescriptions     Pending Prescriptions Disp Refills    Advair -21 MCG/ACT inhaler [Pharmacy Med Name: ADVAIR -21MCG/ACT AERO] 12 g 2     Sig: INHALE TWO PUFFS BY MOUTH TWICE A DAY      Last office visit with prescribing clinician: 8/24/2023         Tess Dietz MA  09/26/23, 10:25 CDT

## 2023-09-28 ENCOUNTER — TELEPHONE (OUTPATIENT)
Dept: VASCULAR SURGERY | Age: 48
End: 2023-09-28

## 2023-09-28 ENCOUNTER — OFFICE VISIT (OUTPATIENT)
Dept: VASCULAR SURGERY | Age: 48
End: 2023-09-28
Payer: MEDICARE

## 2023-09-28 VITALS — SYSTOLIC BLOOD PRESSURE: 108 MMHG | HEART RATE: 86 BPM | DIASTOLIC BLOOD PRESSURE: 70 MMHG

## 2023-09-28 DIAGNOSIS — I70.213 ATHEROSCLER OF NATIVE ARTERY OF BOTH LEGS WITH INTERMIT CLAUDICATION (HCC): Primary | ICD-10-CM

## 2023-09-28 PROCEDURE — 99214 OFFICE O/P EST MOD 30 MIN: CPT | Performed by: NURSE PRACTITIONER

## 2023-09-28 RX ORDER — CLINDAMYCIN HYDROCHLORIDE 300 MG/1
CAPSULE ORAL
COMMUNITY
Start: 2023-08-24

## 2023-09-28 NOTE — TELEPHONE ENCOUNTER
Left a message for this patient to let her know the following. I left our office number for the patient to return the phone call to discuss. ----- Message from MELIDA Horn sent at 9/28/2023 12:46 PM CDT -----  Please let her know if he switches from hmo to ppo he could still come to [facility]. He would have to pay more cause it is out of network but it is accepted. Other options would be to switch insurance or we can refer him to someone else. Let us know what she prefers.

## 2023-10-09 NOTE — TELEPHONE ENCOUNTER
Sandeepcorey Henry called to request a refill on her medication.       Last office visit : 4/20/2023   Next office visit : Visit date not found     Requested Prescriptions     Pending Prescriptions Disp Refills    ADVAIR -21 MCG/ACT inhaler [Pharmacy Med Name: ADVAIR -21MCG/ACT AERO] 12 g 2     Sig: INHALE TWO PUFFS BY MOUTH TWICE A DAY            Trina Patel MA

## 2023-10-10 RX ORDER — FLUTICASONE PROPIONATE AND SALMETEROL XINAFOATE 115; 21 UG/1; UG/1
2 AEROSOL, METERED RESPIRATORY (INHALATION) 2 TIMES DAILY
Qty: 12 G | Refills: 2 | OUTPATIENT
Start: 2023-10-10

## 2023-10-11 RX ORDER — TIZANIDINE 4 MG/1
TABLET ORAL
Qty: 30 TABLET | Refills: 0 | Status: SHIPPED | OUTPATIENT
Start: 2023-10-11

## 2023-10-11 NOTE — TELEPHONE ENCOUNTER
Rx Refill Note  Requested Prescriptions     Pending Prescriptions Disp Refills    tiZANidine (ZANAFLEX) 4 MG tablet [Pharmacy Med Name: TIZANIDINE HCL 4MG TABS] 30 tablet 0     Sig: TAKE ONE TABLET BY MOUTH EVERY 6 HOURS AS NEEDED FOR MUSCLE SPASMS == WILL NEED APPOINTMENT BEFORE FURTHER REFILLS      Last office visit with prescribing clinician: 8/24/2023         Tess Dietz MA  10/11/23, 08:18 CDT

## 2023-10-20 ENCOUNTER — NURSE TRIAGE (OUTPATIENT)
Dept: FAMILY MEDICINE CLINIC | Facility: CLINIC | Age: 48
End: 2023-10-20
Payer: MEDICARE

## 2023-11-01 ENCOUNTER — OFFICE VISIT (OUTPATIENT)
Dept: FAMILY MEDICINE CLINIC | Facility: CLINIC | Age: 48
End: 2023-11-01
Payer: MEDICARE

## 2023-11-01 VITALS
BODY MASS INDEX: 21.16 KG/M2 | HEART RATE: 84 BPM | WEIGHT: 115 LBS | HEIGHT: 62 IN | TEMPERATURE: 96.1 F | OXYGEN SATURATION: 100 % | DIASTOLIC BLOOD PRESSURE: 74 MMHG | RESPIRATION RATE: 20 BRPM | SYSTOLIC BLOOD PRESSURE: 120 MMHG

## 2023-11-01 DIAGNOSIS — R63.4 UNINTENDED WEIGHT LOSS: Primary | ICD-10-CM

## 2023-11-01 DIAGNOSIS — R73.03 PREDIABETES: ICD-10-CM

## 2023-11-01 DIAGNOSIS — Z12.31 ENCOUNTER FOR SCREENING MAMMOGRAM FOR MALIGNANT NEOPLASM OF BREAST: ICD-10-CM

## 2023-11-01 DIAGNOSIS — Z12.11 COLON CANCER SCREENING: ICD-10-CM

## 2023-11-01 DIAGNOSIS — Z11.59 ENCOUNTER FOR SCREENING FOR OTHER VIRAL DISEASES: ICD-10-CM

## 2023-11-01 DIAGNOSIS — Z11.4 ENCOUNTER FOR SCREENING FOR HUMAN IMMUNODEFICIENCY VIRUS (HIV): ICD-10-CM

## 2023-11-01 NOTE — PROGRESS NOTES
Chief Complaint  Fatigue and Weight Loss (Lost 15lb since August /)    Subjective        Maday Fung presents to [facility]    HPI    Patient presents with her aunt, who was concerned for her, with 1 mo h/o weight loss, fatigue, and decreased appetite. Per chart review there has been 15 lb wt loss since last weight-in here 8/2023. She is sleeping more than usual. Has not taken bath in 5 days which is not usual for her.  Her aunt detected slower rate of speech than normal  which alarmed her and had her concerned for stroke. Pt denies any new/focal motor weakness or slurred speech.   Pt has remote h/o lymphoma, has not noticed any s/o enlarged lymph nodes. No abdominal pain or chronic diarrhea. No rectal bleeding or abnormal BM changes. Denies fevers, chills, NS. She is current tobacco smoker.   She reports not taking her muscle relaxer. She is on percocet, doxepin chronically. Treated for anxiety with prozac. When asked she does not indicate clear depressive symptoms. Pt denies substance use of any kind. Additionally she reports no further symptoms related to previous MRSA infection on her buttocks.    Past Medical History:   Diagnosis Date    Cancer     COPD (chronic obstructive pulmonary disease)     Generalized anxiety disorder 1/25/2021    H/O Ross procedure     Hyperlipidemia     Hypertension     Insomnia, unspecified 1/25/2021    Low back pain     Pulmonary embolism     Vitamin D deficiency 1/25/2021     Past Surgical History:   Procedure Laterality Date    DILATATION AND CURETTAGE      HYSTERECTOMY      Full    ROSS KONNO PROCEDURE       Social History     Socioeconomic History    Marital status: Single   Tobacco Use    Smoking status: Every Day    Smokeless tobacco: Never   Substance and Sexual Activity    Alcohol use: Never    Drug use: Never    Sexual activity: Defer       Objective   Vital Signs:  /74   Pulse 84   Temp 96.1 °F (35.6 °C) (Temporal)   Resp 20  "  Ht 157.5 cm (62.01\")   Wt 52.2 kg (115 lb)   SpO2 100%   BMI 21.03 kg/m²   Estimated body mass index is 21.03 kg/m² as calculated from the following:    Height as of this encounter: 157.5 cm (62.01\").    Weight as of this encounter: 52.2 kg (115 lb).       BMI is within normal parameters. No other follow-up for BMI required.      Physical Exam  Vitals reviewed.   Constitutional:       Appearance: Normal appearance.   HENT:      Head: Normocephalic.      Nose: Nose normal.      Mouth/Throat:      Mouth: Mucous membranes are moist.   Eyes:      Extraocular Movements: Extraocular movements intact.   Cardiovascular:      Rate and Rhythm: Normal rate and regular rhythm.      Heart sounds: Normal heart sounds.   Pulmonary:      Effort: Pulmonary effort is normal.      Breath sounds: Normal breath sounds.   Abdominal:      General: There is no distension.      Tenderness: There is no abdominal tenderness.   Musculoskeletal:         General: Normal range of motion.      Cervical back: Normal range of motion.   Lymphadenopathy:      Comments: No cervical, supraclavicular, axillary, or inguinal lymphadenopathy palpated.   Skin:     General: Skin is warm and dry.   Neurological:      General: No focal deficit present.      Mental Status: She is alert and oriented to person, place, and time.   Psychiatric:         Mood and Affect: Mood normal.         Behavior: Behavior normal.        Result Review :                   Assessment and Plan   Diagnoses and all orders for this visit:    1. Unintended weight loss (Primary)  -Given h/o lymphoma will need to r/o malignancy or underlying organic metabolic disorder. Would consider psychiatric cause should workup in the end be unrevealing.  -     CBC w AUTO Differential  -     Vitamin B12  -     Comprehensive metabolic panel  -     Hemoglobin A1c  -     Urinalysis With Culture If Indicated -  -     XR Chest 2 View  -     Hepatitis C antibody  -     Hepatitis B surface antigen  -    "  Hepatitis B surface antibody  -     Hepatitis B core antibody, total  -     Hepatitis A antibody, total  -     HIV-1/O/2 Ag/Ab w Reflex  -     Sedimentation rate, automated  -     C-reactive protein  -     TSH Rfx On Abnormal To Free T4    2. Colon cancer screening  -     Ambulatory Referral to Gastroenterology    3. Encounter for screening mammogram for malignant neoplasm of breast  -     Mammo Screening Digital Tomosynthesis Bilateral With CAD; Future    4. Encounter for screening for other viral diseases  -     Hepatitis B surface antigen  -     Hepatitis B surface antibody  -     Hepatitis B core antibody, total    5. Encounter for screening for human immunodeficiency virus (HIV)  -     HIV-1/O/2 Ag/Ab w Reflex    6. Prediabetes  -     Hemoglobin A1c           I spent 40 minutes caring for Maday on this date of service. This time includes time spent by me in the following activities:preparing for the visit, reviewing tests, obtaining and/or reviewing a separately obtained history, performing a medically appropriate examination and/or evaluation , counseling and educating the patient/family/caregiver, ordering medications, tests, or procedures, documenting information in the medical record, independently interpreting results and communicating that information with the patient/family/caregiver, and care coordination  EMR Dragon/Transcription disclaimer:   Much of this encounter note is an electronic transcription/translation of spoken language to printed text. The electronic translation of spoken language may permit erroneous, or at times, nonsensical words or phrases to be inadvertently transcribed; although attempts have made to review the note for such errors, some may still exist. Please excuse any unrecognized transcription errors and contact us if the air is unintelligible or needs documented correction. Also, portions of this note have been copied forward, however, changed to reflect the most current  clinical status of this patient.  Follow Up   Return in about 4 weeks (around 11/29/2023), or FU after labs.  Patient was given instructions and counseling regarding her condition or for health maintenance advice. Please see specific information pulled into the AVS if appropriate.

## 2023-11-06 ENCOUNTER — TELEPHONE (OUTPATIENT)
Dept: FAMILY MEDICINE CLINIC | Facility: CLINIC | Age: 48
End: 2023-11-06
Payer: MEDICARE

## 2023-11-06 DIAGNOSIS — R79.82 ELEVATED C-REACTIVE PROTEIN (CRP): ICD-10-CM

## 2023-11-06 DIAGNOSIS — N39.0 URINARY TRACT INFECTION WITH HEMATURIA, SITE UNSPECIFIED: Primary | ICD-10-CM

## 2023-11-06 DIAGNOSIS — R70.0 ELEVATED SED RATE: Primary | ICD-10-CM

## 2023-11-06 DIAGNOSIS — R31.9 URINARY TRACT INFECTION WITH HEMATURIA, SITE UNSPECIFIED: Primary | ICD-10-CM

## 2023-11-06 LAB
ALBUMIN SERPL-MCNC: 3.5 G/DL (ref 3.9–4.9)
ALBUMIN/GLOB SERPL: 1 {RATIO} (ref 1.2–2.2)
ALP SERPL-CCNC: 159 IU/L (ref 44–121)
ALT SERPL-CCNC: 25 IU/L (ref 0–32)
APPEARANCE UR: ABNORMAL
AST SERPL-CCNC: 43 IU/L (ref 0–40)
BACTERIA #/AREA URNS HPF: ABNORMAL /[HPF]
BACTERIA UR CULT: ABNORMAL
BACTERIA UR CULT: ABNORMAL
BASOPHILS # BLD AUTO: 0 X10E3/UL (ref 0–0.2)
BASOPHILS NFR BLD AUTO: 0 %
BILIRUB SERPL-MCNC: 0.7 MG/DL (ref 0–1.2)
BILIRUB UR QL STRIP: NEGATIVE
BUN SERPL-MCNC: 15 MG/DL (ref 6–24)
BUN/CREAT SERPL: 15 (ref 9–23)
CALCIUM SERPL-MCNC: 8.6 MG/DL (ref 8.7–10.2)
CASTS URNS QL MICRO: ABNORMAL /LPF
CHLORIDE SERPL-SCNC: 90 MMOL/L (ref 96–106)
CO2 SERPL-SCNC: 30 MMOL/L (ref 20–29)
COLOR UR: YELLOW
CREAT SERPL-MCNC: 0.99 MG/DL (ref 0.57–1)
CRP SERPL-MCNC: 252 MG/L (ref 0–10)
EGFRCR SERPLBLD CKD-EPI 2021: 70 ML/MIN/1.73
EOSINOPHIL # BLD AUTO: 0.1 X10E3/UL (ref 0–0.4)
EOSINOPHIL NFR BLD AUTO: 1 %
EPI CELLS #/AREA URNS HPF: ABNORMAL /HPF (ref 0–10)
ERYTHROCYTE [DISTWIDTH] IN BLOOD BY AUTOMATED COUNT: 12.6 % (ref 11.7–15.4)
ERYTHROCYTE [SEDIMENTATION RATE] IN BLOOD BY WESTERGREN METHOD: 120 MM/HR (ref 0–32)
GLOBULIN SER CALC-MCNC: 3.4 G/DL (ref 1.5–4.5)
GLUCOSE SERPL-MCNC: 108 MG/DL (ref 70–99)
GLUCOSE UR QL STRIP: NEGATIVE
HAV AB SER QL IA: NEGATIVE
HBA1C MFR BLD: 6.2 % (ref 4.8–5.6)
HBV CORE AB SERPL QL IA: NEGATIVE
HBV SURFACE AB SER QL: NON REACTIVE
HBV SURFACE AG SERPL QL IA: NEGATIVE
HCT VFR BLD AUTO: 35.9 % (ref 34–46.6)
HCV IGG SERPL QL IA: NON REACTIVE
HGB BLD-MCNC: 12.2 G/DL (ref 11.1–15.9)
HGB UR QL STRIP: ABNORMAL
HIV 1+2 AB+HIV1 P24 AG SERPL QL IA: NON REACTIVE
IMM GRANULOCYTES # BLD AUTO: 0.1 X10E3/UL (ref 0–0.1)
IMM GRANULOCYTES NFR BLD AUTO: 1 %
KETONES UR QL STRIP: ABNORMAL
LEUKOCYTE ESTERASE UR QL STRIP: ABNORMAL
LYMPHOCYTES # BLD AUTO: 1.4 X10E3/UL (ref 0.7–3.1)
LYMPHOCYTES NFR BLD AUTO: 11 %
MCH RBC QN AUTO: 32.1 PG (ref 26.6–33)
MCHC RBC AUTO-ENTMCNC: 34 G/DL (ref 31.5–35.7)
MCV RBC AUTO: 95 FL (ref 79–97)
MICRO URNS: ABNORMAL
MONOCYTES # BLD AUTO: 0.5 X10E3/UL (ref 0.1–0.9)
MONOCYTES NFR BLD AUTO: 4 %
MORPHOLOGY BLD-IMP: ABNORMAL
NEUTROPHILS # BLD AUTO: 11 X10E3/UL (ref 1.4–7)
NEUTROPHILS NFR BLD AUTO: 83 %
NITRITE UR QL STRIP: NEGATIVE
OTHER ANTIBIOTIC SUSC ISLT: ABNORMAL
PH UR STRIP: 5.5 [PH] (ref 5–7.5)
PLATELET # BLD AUTO: 478 X10E3/UL (ref 150–450)
POTASSIUM SERPL-SCNC: 2.5 MMOL/L (ref 3.5–5.2)
PROT SERPL-MCNC: 6.9 G/DL (ref 6–8.5)
PROT UR QL STRIP: ABNORMAL
RBC # BLD AUTO: 3.8 X10E6/UL (ref 3.77–5.28)
RBC #/AREA URNS HPF: ABNORMAL /HPF (ref 0–2)
SODIUM SERPL-SCNC: 138 MMOL/L (ref 134–144)
SP GR UR STRIP: 1.02 (ref 1–1.03)
T4 FREE SERPL-MCNC: 1.27 NG/DL (ref 0.82–1.77)
TSH SERPL DL<=0.005 MIU/L-ACNC: 0.41 UIU/ML (ref 0.45–4.5)
URINALYSIS REFLEX: ABNORMAL
UROBILINOGEN UR STRIP-MCNC: 1 MG/DL (ref 0.2–1)
VIT B12 SERPL-MCNC: 1156 PG/ML (ref 232–1245)
WBC # BLD AUTO: 13.2 X10E3/UL (ref 3.4–10.8)
WBC #/AREA URNS HPF: >30 /HPF (ref 0–5)

## 2023-11-06 RX ORDER — NITROFURANTOIN 25; 75 MG/1; MG/1
100 CAPSULE ORAL 2 TIMES DAILY
Qty: 10 CAPSULE | Refills: 0 | Status: SHIPPED | OUTPATIENT
Start: 2023-11-06 | End: 2023-11-11

## 2023-11-06 NOTE — TELEPHONE ENCOUNTER
Taylor Rodríguez and Dr. Meza was consulted both advised to send patient to ED due to potassium and follow up with MD when he returns .  She has other labs that is off and NP will put in a referral for Rheumatology note: calcium is low.   Pt has been spoke to directly she had a rough experience at last hospital visit she is hesitant but has agreed to go.

## 2023-11-06 NOTE — TELEPHONE ENCOUNTER
Patient presented to office asking for lab results from 11/1/23. I advised patient labs were back, awaiting provider to advise what to tell her. Pt voiced understanding. Patient was provided new code to reactivate mychart. Patient also asking if Doxepin 25mg QD had been called into [facility]. I reviewed current med list and did not see this medication listed. Medication was dc'd on 11/1/23 by Dr Meza. Patient states she does not take any other sleeping meds at this time.     Patient is aware a message is being routed to Taylor to review chart and advise orders in Dr Meza's absence.     Could you review labs/chart and advise what to tell patient in regards to results and med refill on Doxepin?

## 2023-11-07 ENCOUNTER — HOSPITAL ENCOUNTER (EMERGENCY)
Facility: HOSPITAL | Age: 48
Discharge: HOME | End: 2023-11-07
Attending: STUDENT IN AN ORGANIZED HEALTH CARE EDUCATION/TRAINING PROGRAM | Admitting: STUDENT IN AN ORGANIZED HEALTH CARE EDUCATION/TRAINING PROGRAM
Payer: MEDICARE

## 2023-11-07 ENCOUNTER — APPOINTMENT (OUTPATIENT)
Dept: GENERAL RADIOLOGY | Facility: HOSPITAL | Age: 48
End: 2023-11-07
Payer: MEDICARE

## 2023-11-07 VITALS
HEIGHT: 62 IN | WEIGHT: 115 LBS | BODY MASS INDEX: 21.16 KG/M2 | HEART RATE: 78 BPM | RESPIRATION RATE: 20 BRPM | TEMPERATURE: 98.8 F | SYSTOLIC BLOOD PRESSURE: 123 MMHG | DIASTOLIC BLOOD PRESSURE: 76 MMHG | OXYGEN SATURATION: 98 %

## 2023-11-07 DIAGNOSIS — R89.9 ABNORMAL LABORATORY TEST: Primary | ICD-10-CM

## 2023-11-07 LAB
ALBUMIN SERPL-MCNC: 3.4 G/DL (ref 3.5–5.2)
ALBUMIN/GLOB SERPL: 0.9 G/DL
ALP SERPL-CCNC: 98 U/L (ref 39–117)
ALT SERPL W P-5'-P-CCNC: 23 U/L (ref 1–33)
ANION GAP SERPL CALCULATED.3IONS-SCNC: 10 MMOL/L (ref 5–15)
AST SERPL-CCNC: 31 U/L (ref 1–32)
BACTERIA UR QL AUTO: ABNORMAL /HPF
BASOPHILS # BLD AUTO: 0.07 10*3/MM3 (ref 0–0.2)
BASOPHILS NFR BLD AUTO: 0.9 % (ref 0–1.5)
BILIRUB SERPL-MCNC: 0.2 MG/DL (ref 0–1.2)
BILIRUB UR QL STRIP: NEGATIVE
BUN SERPL-MCNC: 11 MG/DL (ref 6–20)
BUN/CREAT SERPL: 12.6 (ref 7–25)
CALCIUM SPEC-SCNC: 8.8 MG/DL (ref 8.6–10.5)
CHLORIDE SERPL-SCNC: 95 MMOL/L (ref 98–107)
CLARITY UR: CLEAR
CO2 SERPL-SCNC: 34 MMOL/L (ref 22–29)
COLOR UR: YELLOW
CREAT SERPL-MCNC: 0.87 MG/DL (ref 0.57–1)
CRP SERPL-MCNC: 3.86 MG/DL (ref 0–0.5)
DEPRECATED RDW RBC AUTO: 50.4 FL (ref 37–54)
EGFRCR SERPLBLD CKD-EPI 2021: 81.8 ML/MIN/1.73
EOSINOPHIL # BLD AUTO: 0.13 10*3/MM3 (ref 0–0.4)
EOSINOPHIL NFR BLD AUTO: 1.7 % (ref 0.3–6.2)
ERYTHROCYTE [DISTWIDTH] IN BLOOD BY AUTOMATED COUNT: 13.9 % (ref 12.3–15.4)
ERYTHROCYTE [SEDIMENTATION RATE] IN BLOOD: >130 MM/HR (ref 0–20)
GLOBULIN UR ELPH-MCNC: 4 GM/DL
GLUCOSE SERPL-MCNC: 87 MG/DL (ref 65–99)
GLUCOSE UR STRIP-MCNC: NEGATIVE MG/DL
HCT VFR BLD AUTO: 39.4 % (ref 34–46.6)
HGB BLD-MCNC: 12.6 G/DL (ref 12–15.9)
HGB UR QL STRIP.AUTO: ABNORMAL
HYALINE CASTS UR QL AUTO: ABNORMAL /LPF
IMM GRANULOCYTES # BLD AUTO: 0.04 10*3/MM3 (ref 0–0.05)
IMM GRANULOCYTES NFR BLD AUTO: 0.5 % (ref 0–0.5)
KETONES UR QL STRIP: ABNORMAL
LEUKOCYTE ESTERASE UR QL STRIP.AUTO: ABNORMAL
LYMPHOCYTES # BLD AUTO: 2.26 10*3/MM3 (ref 0.7–3.1)
LYMPHOCYTES NFR BLD AUTO: 29.5 % (ref 19.6–45.3)
MAGNESIUM SERPL-MCNC: 1.6 MG/DL (ref 1.6–2.6)
MCH RBC QN AUTO: 31.7 PG (ref 26.6–33)
MCHC RBC AUTO-ENTMCNC: 32 G/DL (ref 31.5–35.7)
MCV RBC AUTO: 99.2 FL (ref 79–97)
MONOCYTES # BLD AUTO: 0.49 10*3/MM3 (ref 0.1–0.9)
MONOCYTES NFR BLD AUTO: 6.4 % (ref 5–12)
NEUTROPHILS NFR BLD AUTO: 4.67 10*3/MM3 (ref 1.7–7)
NEUTROPHILS NFR BLD AUTO: 61 % (ref 42.7–76)
NITRITE UR QL STRIP: NEGATIVE
NRBC BLD AUTO-RTO: 0 /100 WBC (ref 0–0.2)
PH UR STRIP.AUTO: 5.5 [PH] (ref 5–8)
PHOSPHATE SERPL-MCNC: 5.2 MG/DL (ref 2.5–4.5)
PLATELET # BLD AUTO: 563 10*3/MM3 (ref 140–450)
PMV BLD AUTO: 9.6 FL (ref 6–12)
POTASSIUM SERPL-SCNC: 3.2 MMOL/L (ref 3.5–5.2)
PROT SERPL-MCNC: 7.4 G/DL (ref 6–8.5)
PROT UR QL STRIP: ABNORMAL
RBC # BLD AUTO: 3.97 10*6/MM3 (ref 3.77–5.28)
RBC # UR STRIP: ABNORMAL /HPF
REF LAB TEST METHOD: ABNORMAL
SODIUM SERPL-SCNC: 139 MMOL/L (ref 136–145)
SP GR UR STRIP: 1.03 (ref 1–1.03)
SQUAMOUS #/AREA URNS HPF: ABNORMAL /HPF
UROBILINOGEN UR QL STRIP: ABNORMAL
WBC # UR STRIP: ABNORMAL /HPF
WBC NRBC COR # BLD: 7.66 10*3/MM3 (ref 3.4–10.8)

## 2023-11-07 PROCEDURE — 93010 ELECTROCARDIOGRAM REPORT: CPT | Performed by: INTERNAL MEDICINE

## 2023-11-07 PROCEDURE — 99284 EMERGENCY DEPT VISIT MOD MDM: CPT

## 2023-11-07 PROCEDURE — 85025 COMPLETE CBC W/AUTO DIFF WBC: CPT | Performed by: STUDENT IN AN ORGANIZED HEALTH CARE EDUCATION/TRAINING PROGRAM

## 2023-11-07 PROCEDURE — 81001 URINALYSIS AUTO W/SCOPE: CPT | Performed by: STUDENT IN AN ORGANIZED HEALTH CARE EDUCATION/TRAINING PROGRAM

## 2023-11-07 PROCEDURE — 87086 URINE CULTURE/COLONY COUNT: CPT | Performed by: STUDENT IN AN ORGANIZED HEALTH CARE EDUCATION/TRAINING PROGRAM

## 2023-11-07 PROCEDURE — 93005 ELECTROCARDIOGRAM TRACING: CPT

## 2023-11-07 PROCEDURE — 86140 C-REACTIVE PROTEIN: CPT | Performed by: STUDENT IN AN ORGANIZED HEALTH CARE EDUCATION/TRAINING PROGRAM

## 2023-11-07 PROCEDURE — 80053 COMPREHEN METABOLIC PANEL: CPT | Performed by: STUDENT IN AN ORGANIZED HEALTH CARE EDUCATION/TRAINING PROGRAM

## 2023-11-07 PROCEDURE — 36415 COLL VENOUS BLD VENIPUNCTURE: CPT

## 2023-11-07 PROCEDURE — 84100 ASSAY OF PHOSPHORUS: CPT | Performed by: STUDENT IN AN ORGANIZED HEALTH CARE EDUCATION/TRAINING PROGRAM

## 2023-11-07 PROCEDURE — 85652 RBC SED RATE AUTOMATED: CPT | Performed by: STUDENT IN AN ORGANIZED HEALTH CARE EDUCATION/TRAINING PROGRAM

## 2023-11-07 PROCEDURE — 83735 ASSAY OF MAGNESIUM: CPT | Performed by: STUDENT IN AN ORGANIZED HEALTH CARE EDUCATION/TRAINING PROGRAM

## 2023-11-07 PROCEDURE — 71046 X-RAY EXAM CHEST 2 VIEWS: CPT

## 2023-11-07 RX ORDER — DOXEPIN HYDROCHLORIDE 25 MG/1
25 CAPSULE ORAL NIGHTLY
Qty: 90 CAPSULE | Refills: 1 | Status: SHIPPED | OUTPATIENT
Start: 2023-11-07

## 2023-11-07 RX ORDER — POTASSIUM CHLORIDE 20 MEQ/1
20 TABLET, EXTENDED RELEASE ORAL 2 TIMES DAILY
Qty: 60 TABLET | Refills: 0 | Status: SHIPPED | OUTPATIENT
Start: 2023-11-07 | End: 2023-12-07

## 2023-11-07 NOTE — TELEPHONE ENCOUNTER
Pt presented to the  before calling she didn't go to the ED last night she states she got up in the middle of the night and went to get potassium pills and ate banana's.  She is scared to go the ED she had a very bad experience last visit.  I told her she must go to the ED preferably a [facility] obviously we would like [facility] but she states this is where her bad experience happened.  I told her point blank her heart can stop her lab is critical and she needs to go, she voiced understanding and states her aunt he taking her now.

## 2023-11-07 NOTE — TELEPHONE ENCOUNTER
Rx Refill Note  Requested Prescriptions     Pending Prescriptions Disp Refills    doxepin (SINEquan) 25 MG capsule 90 capsule 1     Sig: Take 1 capsule by mouth Every Night.      Last office visit with prescribing clinician: 11/1/2023   Last telemedicine visit with prescribing clinician: Visit date not found   Next office visit with prescribing clinician: Visit date not found    Consulted with Dr. Meza on 11-06-23 pt can have this for sleep    Rhea Arreaga MA  11/07/23, 10:36 CST

## 2023-11-07 NOTE — ED PROVIDER NOTES
Subjective   History of Present Illness  Patient presents due to lab abnormalities.  She was called by her doctor and told to come into the emergency department because of significant hypokalemia.  She saw her doctor about a week ago after diarrhea, poor appetite, and weight loss.  She feels her symptoms are overall improving.  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  No vomiting.  Her diarrhea has resolved and she has constipation again which is her baseline.  No abdominal pain chest pain or shortness of breath.  No syncope.  She has resumed having a better appetite.  She states that about a week ago she quit urinating for 2 days.  Has a history of taking Lasix for history of heart failure, quit taking when she felt dehydrated, has recovered normal urinary function.  Her doctor sent in antibiotics for UTI after reviewing her lab studies and she has not started taking them yet.    Review of Systems   Constitutional:  Negative for chills and fever.   Respiratory:  Negative for cough and shortness of breath.    Cardiovascular:  Negative for chest pain and palpitations.   Gastrointestinal:  Negative for abdominal pain and vomiting.   Genitourinary:  Negative for difficulty urinating and dysuria.   Neurological:  Negative for syncope and light-headedness.       Past Medical History:   Diagnosis Date    Cancer     COPD (chronic obstructive pulmonary disease)     Generalized anxiety disorder 1/25/2021    H/O Ross procedure     Hyperlipidemia     Hypertension     Insomnia, unspecified 1/25/2021    Low back pain     Pulmonary embolism     Vitamin D deficiency 1/25/2021       Allergies   Allergen Reactions    Hydrocodone-Acetaminophen Hives     She breaks out in hives, rash and her throat feels funny     Cefaclor Swelling    Penicillins Hives    Sulfa Antibiotics Other (See Comments)       Past Surgical History:   Procedure Laterality Date    DILATATION AND  CURETTAGE      HYSTERECTOMY      Full    ROSS KONNO PROCEDURE         Family History   Problem Relation Age of Onset    Diabetes Mother     Heart failure Mother     Cancer Father        Social History     Socioeconomic History    Marital status: Single   Tobacco Use    Smoking status: Every Day    Smokeless tobacco: Never   Substance and Sexual Activity    Alcohol use: Never    Drug use: Never    Sexual activity: Defer           Objective   Physical Exam  Vitals reviewed.   Constitutional:       General: She is not in acute distress.  HENT:      Head: Normocephalic and atraumatic.      Comments: No focal intraoral swelling.  No uvular deviation.  No posterior pharyngeal erythema or exudate.  No tonsillar exudate or focal tonsillar swelling.  Intraoral exam overall unremarkable.  No temporal ttp bilaterally.  Eyes:      Extraocular Movements: Extraocular movements intact.      Conjunctiva/sclera: Conjunctivae normal.   Cardiovascular:      Pulses: Normal pulses.      Heart sounds: Normal heart sounds.   Pulmonary:      Effort: Pulmonary effort is normal. No respiratory distress.      Breath sounds: No wheezing.   Abdominal:      General: Abdomen is flat. There is no distension.      Tenderness: There is no abdominal tenderness. There is no guarding.   Musculoskeletal:      Cervical back: Normal range of motion and neck supple.   Skin:     General: Skin is warm and dry.   Neurological:      General: No focal deficit present.      Mental Status: She is alert. Mental status is at baseline.   Psychiatric:         Behavior: Behavior normal.         Thought Content: Thought content normal.         Procedures           ED Course  ED Course as of 11/07/23 1443   Tue Nov 07, 2023   1234 EKG: sinus rhythm, no focal ischemic changes, no arrhythmia. [AS]   1347 Sed Rate(!): >130 [AS]   1354 ESR is significantly elevated without clear etiology; UA is equivocal for UTI though would be reasonable to treat given two acute phase  reactants are elevated (ESR and platelets). Pt has had some initial rheumatologic workup that looks benign. No implants to suggest a seed source for infection, no skin ulcers or wounds. Afebrile, no leukocytosis, no tachypnea. Pt has a strong predilection for discharge, will finish workup and reassess. [AS]      ED Course User Index  [AS] Bradley Solorzano MD                                           Medical Decision Making  Amount and/or Complexity of Data Reviewed  Labs: ordered. Decision-making details documented in ED Course.  Radiology: ordered.      Maday Fung is a 48 y.o. female with PMH above who presents to the Emergency Department with hypokalemia.  Provoking factors may be diarrhea, as well as taking Lasix.  Says like she is significantly dehydrated recently however she has had clinical improvement.  will repeat the lab studies given that the abnormalities were present 6 days ago.    ED Course:   -workup shows mild hypokalemia, significantly elevated ESR. Possibly UTI given equivocal/infectious UA. Otherwise, unclear etiology: no skin ulcers, no implants that could seed infection, no pneumonia. No temporal ttp, jaw claudication, or vision changes on repeat history. No fevers. Hpyokalemia is improved likely d/t holding lasix; discussed holding lasix, close outpt f/u.  Patient understands she needs continued rheumatologic work-up and she understands return precautions regarding fever or other signs of infection.  No further workup indicated at this time. Patient is stable and appropriate for discharge. Patient received strict return precautions per discharge instructions and was instructed to follow-up with their primary care provider regarding their ER visit.        Final diagnosis: Abnormal lab    All questions answered. Patient/family was understanding and in agreement with today's assessment and plan. The patient was monitored during their stay in the ED and dispositioned without acute  event.    Electronically signed by:  Bradley Solorzano MD 11/7/2023 14:43 CST      Note: Dragon medical dictation software was used in the creation of this note.        Final diagnoses:   Abnormal laboratory test       ED Disposition  ED Disposition       ED Disposition   Discharge    Condition   Stable    Comment   --               Hadley Meza MD  [address] [id number] [phone]               Medication List        New Prescriptions      potassium chloride 20 MEQ CR tablet  Commonly known as: K-DUR,KLOR-CON  Take 1 tablet by mouth 2 (Two) Times a Day for 30 days.               Where to Get Your Medications        These medications were sent to [facility] - [place], [place] - [address] - [phone]  - [phone]   [address] [id number]      Phone: [phone]   potassium chloride 20 MEQ CR tablet            Bradley Solorzano MD  11/07/23 6448

## 2023-11-08 LAB
BACTERIA SPEC AEROBE CULT: ABNORMAL
QT INTERVAL: 442 MS
QTC INTERVAL: 531 MS

## 2023-11-28 ENCOUNTER — OFFICE VISIT (OUTPATIENT)
Dept: FAMILY MEDICINE CLINIC | Facility: CLINIC | Age: 48
End: 2023-11-28
Payer: MEDICARE

## 2023-11-28 VITALS
RESPIRATION RATE: 18 BRPM | SYSTOLIC BLOOD PRESSURE: 124 MMHG | HEART RATE: 95 BPM | HEIGHT: 62 IN | TEMPERATURE: 96 F | OXYGEN SATURATION: 95 % | BODY MASS INDEX: 21.03 KG/M2 | DIASTOLIC BLOOD PRESSURE: 78 MMHG

## 2023-11-28 DIAGNOSIS — E87.6 HYPOKALEMIA: ICD-10-CM

## 2023-11-28 DIAGNOSIS — R07.81 RIB PAIN ON LEFT SIDE: ICD-10-CM

## 2023-11-28 DIAGNOSIS — F17.200 TOBACCO DEPENDENCE: ICD-10-CM

## 2023-11-28 DIAGNOSIS — M79.18 MUSCULOSKELETAL PAIN: Primary | ICD-10-CM

## 2023-11-28 PROCEDURE — 99214 OFFICE O/P EST MOD 30 MIN: CPT | Performed by: STUDENT IN AN ORGANIZED HEALTH CARE EDUCATION/TRAINING PROGRAM

## 2023-11-28 NOTE — PROGRESS NOTES
"       Chief Complaint  Pain (lung)    Subjective        Maday Fung presents to [facility]    HPI    Follow up   Pt was notified urgently following lab results ordered at last visit to go to ED d/t severe hypokalemia (2.5). She ended up deciding to stay home that day and take potassium supplement before going to hospital next day. Potassium level did correct. On lasix 20mg for CHF history. Pt states she has been taking her potassium supplement.     Referral was put in by colleague for rheumatology, which was denied.  Patient indicating she is having diffuse myalgias in multiple parts of the body including arms, legs, hips, shoulders.  Does not appear to be affecting joints.  She notes chronic pain in her left rib side which she is not sure what is causing this.  She notes history of pleurisy.  Chest x-ray performed in ER recently not revealing    She states she has cut back smoking to 1/2 pk/day.  She would like to eventually quit    Past Medical History:   Diagnosis Date    Cancer     COPD (chronic obstructive pulmonary disease)     Generalized anxiety disorder 1/25/2021    H/O Ross procedure     Hyperlipidemia     Hypertension     Insomnia, unspecified 1/25/2021    Low back pain     Pulmonary embolism     Vitamin D deficiency 1/25/2021     Past Surgical History:   Procedure Laterality Date    DILATATION AND CURETTAGE      HYSTERECTOMY      Full    ROSS KONNO PROCEDURE       Social History     Socioeconomic History    Marital status: Single   Tobacco Use    Smoking status: Every Day    Smokeless tobacco: Never   Substance and Sexual Activity    Alcohol use: Never    Drug use: Never    Sexual activity: Defer       Objective   Vital Signs:  /78   Pulse 95   Temp 96 °F (35.6 °C) (Temporal)   Resp 18   Ht 157.5 cm (62\")   SpO2 95%   BMI 21.03 kg/m²   Estimated body mass index is 21.03 kg/m² as calculated from the following:    Height as of this encounter: 157.5 cm " "(62\").    Weight as of 11/7/23: 52.2 kg (115 lb).       BMI is within normal parameters. No other follow-up for BMI required.      Physical Exam  Vitals reviewed.   Constitutional:       Appearance: Normal appearance.   HENT:      Head: Normocephalic.      Nose: Nose normal.      Mouth/Throat:      Mouth: Mucous membranes are moist.   Eyes:      Extraocular Movements: Extraocular movements intact.   Cardiovascular:      Rate and Rhythm: Normal rate and regular rhythm.      Heart sounds: Normal heart sounds.   Pulmonary:      Effort: Pulmonary effort is normal.      Breath sounds: Normal breath sounds.   Musculoskeletal:         General: Normal range of motion.      Cervical back: Normal range of motion.      Comments: Myalgias located in multiple muscle groups from upper to lower extremities   Skin:     General: Skin is warm and dry.   Neurological:      General: No focal deficit present.      Mental Status: She is alert and oriented to person, place, and time.   Psychiatric:         Mood and Affect: Mood normal.         Behavior: Behavior normal.        Result Review :                   Assessment and Plan   Diagnoses and all orders for this visit:    1. Musculoskeletal pain (Primary)  -Would appreciate rheumatology input for fibromyalgia given widespread musculoskeletal pain, fatigue, and suspected psychiatric challenges as well.. Other lab work-up reviewed.    -     Ambulatory Referral to Rheumatology    2. Rib pain on left side  -     XR Ribs Left With PA Chest; Future    3. Hypokalemia  -Decrease Lasix to 10 mg, continue potassium supplement.  Recheck levels in 1 to 2 weeks  -     Comprehensive metabolic panel; Future    4. Tobacco dependence  -Encouraged patient to quit smoking.  We will revisit this at future visits      EMR Dragon/Transcription disclaimer:   Much of this encounter note is an electronic transcription/translation of spoken language to printed text. The electronic translation of spoken language " may permit erroneous, or at times, nonsensical words or phrases to be inadvertently transcribed; although attempts have made to review the note for such errors, some may still exist. Please excuse any unrecognized transcription errors and contact us if the air is unintelligible or needs documented correction. Also, portions of this note have been copied forward, however, changed to reflect the most current clinical status of this patient.  Follow Up   No follow-ups on file.  Patient was given instructions and counseling regarding her condition or for health maintenance advice. Please see specific information pulled into the AVS if appropriate.

## 2023-12-04 DIAGNOSIS — I70.213 ATHEROSCLER OF NATIVE ARTERY OF BOTH LEGS WITH INTERMIT CLAUDICATION (HCC): Primary | ICD-10-CM

## 2023-12-04 NOTE — TELEPHONE ENCOUNTER
Rx Refill Note  Requested Prescriptions     Pending Prescriptions Disp Refills    cilostazol (PLETAL) 100 MG tablet [Pharmacy Med Name: CILOSTAZOL 100MG TABS] 60 tablet 5     Sig: TAKE ONE TABLET BY MOUTH TWICE A DAY      Last office visit with prescribing clinician: 11/28/2023   Last telemedicine visit with prescribing clinician: Visit date not found   Next office visit with prescribing clinician: Visit date not found           Rhea Arreaga MA  12/04/23, 16:22 CST

## 2023-12-04 NOTE — TELEPHONE ENCOUNTER
Rx Refill Note  Requested Prescriptions     Pending Prescriptions Disp Refills    cilostazol (PLETAL) 100 MG tablet [Pharmacy Med Name: CILOSTAZOL 100MG TABS] 60 tablet 5     Sig: TAKE ONE TABLET BY MOUTH TWICE A DAY        Lilia Giron MA  12/04/23, 16:05 CST

## 2023-12-05 RX ORDER — CILOSTAZOL 100 MG/1
100 TABLET ORAL 2 TIMES DAILY
Qty: 60 TABLET | Refills: 5 | Status: SHIPPED | OUTPATIENT
Start: 2023-12-05

## 2023-12-05 RX ORDER — POTASSIUM CHLORIDE 20 MEQ/1
20 TABLET, EXTENDED RELEASE ORAL 2 TIMES DAILY
Qty: 60 TABLET | Refills: 0 | Status: SHIPPED | OUTPATIENT
Start: 2023-12-05

## 2023-12-05 NOTE — TELEPHONE ENCOUNTER
Rx Refill Note  Requested Prescriptions     Pending Prescriptions Disp Refills    potassium chloride (K-DUR,KLOR-CON) 20 MEQ CR tablet [Pharmacy Med Name: POTASSIUM CHLORIDE DARÍO ER 20 TBCR] 60 tablet 0     Sig: TAKE ONE TABLET BY MOUTH TWICE A DAY      Last office visit with prescribing clinician: 11/28/2023   Last telemedicine visit with prescribing clinician: Visit date not found   Next office visit with prescribing clinician: Visit date not found                         Would you like a call back once the refill request has been completed: [] Yes [] No    If the office needs to give you a call back, can they leave a voicemail: [] Yes [] No    Tess Dietz MA  12/05/23, 08:13 CST

## 2023-12-14 ENCOUNTER — OFFICE VISIT (OUTPATIENT)
Dept: FAMILY MEDICINE CLINIC | Facility: CLINIC | Age: 48
End: 2023-12-14
Payer: MEDICARE

## 2023-12-14 VITALS
WEIGHT: 125 LBS | DIASTOLIC BLOOD PRESSURE: 74 MMHG | OXYGEN SATURATION: 95 % | SYSTOLIC BLOOD PRESSURE: 110 MMHG | TEMPERATURE: 97.5 F | BODY MASS INDEX: 23 KG/M2 | HEART RATE: 100 BPM | RESPIRATION RATE: 20 BRPM | HEIGHT: 62 IN

## 2023-12-14 DIAGNOSIS — R53.83 OTHER FATIGUE: ICD-10-CM

## 2023-12-14 DIAGNOSIS — J44.1 COPD WITH ACUTE EXACERBATION: Primary | ICD-10-CM

## 2023-12-14 DIAGNOSIS — R07.81 RIB PAIN ON LEFT SIDE: ICD-10-CM

## 2023-12-14 PROCEDURE — 99214 OFFICE O/P EST MOD 30 MIN: CPT | Performed by: STUDENT IN AN ORGANIZED HEALTH CARE EDUCATION/TRAINING PROGRAM

## 2023-12-14 RX ORDER — AZITHROMYCIN 250 MG/1
TABLET, FILM COATED ORAL
Qty: 6 TABLET | Refills: 0 | Status: SHIPPED | OUTPATIENT
Start: 2023-12-14

## 2023-12-14 RX ORDER — IPRATROPIUM BROMIDE AND ALBUTEROL SULFATE 2.5; .5 MG/3ML; MG/3ML
3 SOLUTION RESPIRATORY (INHALATION) EVERY 4 HOURS PRN
Qty: 360 ML | Refills: 5 | Status: SHIPPED | OUTPATIENT
Start: 2023-12-14

## 2023-12-14 RX ORDER — PREDNISONE 20 MG/1
40 TABLET ORAL DAILY
Qty: 10 TABLET | Refills: 0 | Status: SHIPPED | OUTPATIENT
Start: 2023-12-14 | End: 2023-12-19

## 2023-12-14 NOTE — PROGRESS NOTES
"       Chief Complaint  left lung pain  and Shortness of Breath (Yellow and green coming out. /)    Subjective        Maday Fung presents to [facility]    HPI    COPD  Pt c/o L-sided rib pain x1 mo with acute sx of productive cough, more dyspnea. No fever. +Chills. No chest pain.  Has been using NC O2 at night to help in addition to her advair inhaler.  Rib discomfort discussed at last visit. She did not get xray completed. She is still smoking, says she is trying to cut back.    Past Medical History:   Diagnosis Date    Cancer     COPD (chronic obstructive pulmonary disease)     Generalized anxiety disorder 1/25/2021    H/O Ross procedure     Hyperlipidemia     Hypertension     Insomnia, unspecified 1/25/2021    Low back pain     Pulmonary embolism     Vitamin D deficiency 1/25/2021     Past Surgical History:   Procedure Laterality Date    DILATATION AND CURETTAGE      HYSTERECTOMY      Full    ROSS KONNO PROCEDURE       Social History     Socioeconomic History    Marital status: Single   Tobacco Use    Smoking status: Every Day    Smokeless tobacco: Never   Substance and Sexual Activity    Alcohol use: Never    Drug use: Never    Sexual activity: Defer       Objective   Vital Signs:  /74   Pulse 100   Temp 97.5 °F (36.4 °C) (Temporal)   Resp 20   Ht 157.5 cm (62.01\")   Wt 56.7 kg (125 lb)   SpO2 95%   BMI 22.86 kg/m²   Estimated body mass index is 22.86 kg/m² as calculated from the following:    Height as of this encounter: 157.5 cm (62.01\").    Weight as of this encounter: 56.7 kg (125 lb).       BMI is within normal parameters. No other follow-up for BMI required.      Physical Exam  Vitals reviewed.   Constitutional:       Appearance: Normal appearance.   HENT:      Head: Normocephalic.      Nose: Nose normal.      Mouth/Throat:      Mouth: Mucous membranes are moist.   Eyes:      Extraocular Movements: Extraocular movements intact.   Cardiovascular:      " Rate and Rhythm: Normal rate and regular rhythm.      Heart sounds: Normal heart sounds.   Pulmonary:      Effort: Pulmonary effort is normal.      Breath sounds: Normal breath sounds.      Comments: Mild scattered wheezes. No crackes/rales  Chest:      Comments: L upper posteroalteral rib ttp in area near axilla  Musculoskeletal:         General: Normal range of motion.      Cervical back: Normal range of motion.   Skin:     General: Skin is warm and dry.   Neurological:      General: No focal deficit present.      Mental Status: She is alert and oriented to person, place, and time.   Psychiatric:         Mood and Affect: Mood normal.         Behavior: Behavior normal.        Result Review :                   Assessment and Plan   Diagnoses and all orders for this visit:    1. COPD, frequent exacerbations (Primary)  -Advised pt needs to stop smoking. Continue advair inhaler. Add duonebs. RTC parameters discussed  -     XR Chest 2 View; Future  -     predniSONE (DELTASONE) 20 MG tablet; Take 2 tablets by mouth Daily for 5 days.  Dispense: 10 tablet; Refill: 0  -     azithromycin (Zithromax Z-Mikhail) 250 MG tablet; Take 2 tablets by mouth on day 1, then 1 tablet daily on days 2-5  Dispense: 6 tablet; Refill: 0  -     ipratropium-albuterol (DUO-NEB) 0.5-2.5 mg/3 ml nebulizer; Take 3 mL by nebulization Every 4 (Four) Hours As Needed for Wheezing.  Dispense: 360 mL; Refill: 5    2. Rib pain on left side  -Favor costochondritis vs msk pain at this point. We discussed nsaid use for this. If continues may consider CT chest to further investigate.           EMR Dragon/Transcription disclaimer:   Much of this encounter note is an electronic transcription/translation of spoken language to printed text. The electronic translation of spoken language may permit erroneous, or at times, nonsensical words or phrases to be inadvertently transcribed; although attempts have made to review the note for such errors, some may still exist.  Please excuse any unrecognized transcription errors and contact us if the air is unintelligible or needs documented correction. Also, portions of this note have been copied forward, however, changed to reflect the most current clinical status of this patient.  Follow Up   No follow-ups on file.  Patient was given instructions and counseling regarding her condition or for health maintenance advice. Please see specific information pulled into the AVS if appropriate.

## 2023-12-15 RX ORDER — CYANOCOBALAMIN 1000 UG/ML
1000 INJECTION, SOLUTION INTRAMUSCULAR; SUBCUTANEOUS
Status: SHIPPED | OUTPATIENT
Start: 2023-12-15

## 2023-12-15 RX ADMIN — CYANOCOBALAMIN 1000 MCG: 1000 INJECTION, SOLUTION INTRAMUSCULAR; SUBCUTANEOUS at 09:06

## 2023-12-19 RX ORDER — CILOSTAZOL 100 MG/1
100 TABLET ORAL 2 TIMES DAILY
Qty: 60 TABLET | Refills: 5 | OUTPATIENT
Start: 2023-12-19

## 2023-12-29 ENCOUNTER — OFFICE VISIT (OUTPATIENT)
Dept: FAMILY MEDICINE CLINIC | Facility: CLINIC | Age: 48
End: 2023-12-29
Payer: MEDICARE

## 2023-12-29 VITALS
DIASTOLIC BLOOD PRESSURE: 78 MMHG | RESPIRATION RATE: 18 BRPM | HEART RATE: 97 BPM | SYSTOLIC BLOOD PRESSURE: 124 MMHG | TEMPERATURE: 97 F | WEIGHT: 127 LBS | HEIGHT: 62 IN | BODY MASS INDEX: 23.37 KG/M2 | OXYGEN SATURATION: 98 %

## 2023-12-29 DIAGNOSIS — R07.81 RIB PAIN ON LEFT SIDE: Primary | ICD-10-CM

## 2023-12-29 DIAGNOSIS — J44.1 COPD WITH EXACERBATION: ICD-10-CM

## 2023-12-29 DIAGNOSIS — R06.00 DYSPNEA, UNSPECIFIED TYPE: ICD-10-CM

## 2023-12-29 DIAGNOSIS — J44.1 COPD WITH ACUTE EXACERBATION: ICD-10-CM

## 2023-12-29 DIAGNOSIS — R05.3 PERSISTENT COUGH: ICD-10-CM

## 2023-12-29 RX ORDER — METHYLPREDNISOLONE SODIUM SUCCINATE 125 MG/2ML
62.5 INJECTION, POWDER, LYOPHILIZED, FOR SOLUTION INTRAMUSCULAR; INTRAVENOUS ONCE
Status: COMPLETED | OUTPATIENT
Start: 2023-12-29 | End: 2023-12-29

## 2023-12-29 RX ORDER — DEXTROMETHORPHAN HYDROBROMIDE AND PROMETHAZINE HYDROCHLORIDE 15; 6.25 MG/5ML; MG/5ML
5 SYRUP ORAL 4 TIMES DAILY PRN
Qty: 118 ML | Refills: 0 | Status: SHIPPED | OUTPATIENT
Start: 2023-12-29

## 2023-12-29 RX ORDER — DOXYCYCLINE HYCLATE 100 MG/1
100 CAPSULE ORAL 2 TIMES DAILY
Qty: 20 CAPSULE | Refills: 0 | Status: SHIPPED | OUTPATIENT
Start: 2023-12-29 | End: 2024-01-08

## 2023-12-29 RX ORDER — BENZONATATE 200 MG/1
200 CAPSULE ORAL 3 TIMES DAILY PRN
Qty: 21 CAPSULE | Refills: 0 | Status: SHIPPED | OUTPATIENT
Start: 2023-12-29

## 2023-12-29 RX ADMIN — METHYLPREDNISOLONE SODIUM SUCCINATE 62.5 MG: 125 INJECTION, POWDER, LYOPHILIZED, FOR SOLUTION INTRAMUSCULAR; INTRAVENOUS at 13:52

## 2023-12-29 NOTE — PROGRESS NOTES
"Chief Complaint  left side  (Lung /copd  week ago b12/ zpack )    Subjective        Maday Fung presents to [facility]  History of Present Illness  Here for acute visit  Reports cough on and off x1 month  She was treated with b12 injection, prednisone 20 mg bid x5 days and azithromycin.   Cough is severe   Has needed oxygen during the day, which she typically only needs at night  Nebulizer helps some but then cough returns shortly  Cough is keeping her from sleeping  Continue left rib/lung pain since previous visit, worse with coughing and movement   Reports this rib pain has been going on for years but worse right now   She is a smoker           Objective   Vital Signs:  /78   Pulse 97   Temp 97 °F (36.1 °C) (Temporal)   Resp 18   Ht 157.5 cm (62\")   Wt 57.6 kg (127 lb)   SpO2 98%   BMI 23.23 kg/m²   Estimated body mass index is 23.23 kg/m² as calculated from the following:    Height as of this encounter: 157.5 cm (62\").    Weight as of this encounter: 57.6 kg (127 lb).       BMI is within normal parameters. No other follow-up for BMI required.      Physical Exam  Vitals and nursing note reviewed.   Constitutional:       General: She is not in acute distress.     Appearance: She is well-developed.   HENT:      Head: Normocephalic and atraumatic.      Right Ear: Tympanic membrane and ear canal normal.      Left Ear: Tympanic membrane and ear canal normal.      Nose: Nose normal.      Right Sinus: No maxillary sinus tenderness or frontal sinus tenderness.      Left Sinus: No maxillary sinus tenderness or frontal sinus tenderness.      Mouth/Throat:      Mouth: Mucous membranes are moist.      Pharynx: Oropharynx is clear. Uvula midline. No uvula swelling.   Eyes:      Conjunctiva/sclera: Conjunctivae normal.   Neck:      Thyroid: No thyromegaly.      Trachea: No tracheal deviation.   Cardiovascular:      Rate and Rhythm: Normal rate and regular rhythm.      Heart " sounds: Normal heart sounds.   Pulmonary:      Effort: Pulmonary effort is normal.      Breath sounds: Examination of the right-upper field reveals wheezing. Examination of the left-upper field reveals wheezing. Decreased breath sounds and wheezing present.   Musculoskeletal:      Cervical back: Neck supple.   Lymphadenopathy:      Cervical: No cervical adenopathy.   Skin:     General: Skin is warm and dry.   Neurological:      Mental Status: She is alert.   Psychiatric:         Behavior: Behavior normal.        Result Review :                   Assessment and Plan   Diagnoses and all orders for this visit:    1. Rib pain on left side (Primary)    2. Dyspnea, unspecified type  -     CT Chest Without Contrast; Future    3. COPD with exacerbation  -     methylPREDNISolone sodium succinate (SOLU-Medrol) injection 62.5 mg    4. Persistent cough  -     CT Chest Without Contrast; Future    Other orders  -     doxycycline (VIBRAMYCIN) 100 MG capsule; Take 1 capsule by mouth 2 (Two) Times a Day for 10 days.  Dispense: 20 capsule; Refill: 0  -     promethazine-dextromethorphan (PROMETHAZINE-DM) 6.25-15 MG/5ML syrup; Take 5 mL by mouth 4 (Four) Times a Day As Needed for Cough.  Dispense: 118 mL; Refill: 0  -     benzonatate (TESSALON) 200 MG capsule; Take 1 capsule by mouth 3 (Three) Times a Day As Needed for Cough.  Dispense: 21 capsule; Refill: 0      Plan:  Ct chest recommend to eval ongoing cough with tobacco use history as well as concern for pna   Doxycyline course prescribed  Solumedrol injection IM today in clinic   Phenergan dm sent as well as tessalon perles prescribed to use as needed for cough  Strongly encouraged to use duoneb q4-6 hours as prescribed   Continue supplemental oxygen   Go to er if worse or not improved         Follow Up   Return in about 1 week (around 1/5/2024), or if symptoms worsen or fail to improve.  Patient was given instructions and counseling regarding her condition or for health  maintenance advice. Please see specific information pulled into the AVS if appropriate.

## 2024-01-08 DIAGNOSIS — F17.200 TOBACCO DEPENDENCE: ICD-10-CM

## 2024-01-09 RX ORDER — POTASSIUM CHLORIDE 20 MEQ/1
20 TABLET, EXTENDED RELEASE ORAL 2 TIMES DAILY
Qty: 60 TABLET | Refills: 0 | Status: SHIPPED | OUTPATIENT
Start: 2024-01-09

## 2024-01-09 RX ORDER — BUPROPION HYDROCHLORIDE 150 MG/1
TABLET, EXTENDED RELEASE ORAL
Qty: 60 TABLET | Refills: 2 | OUTPATIENT
Start: 2024-01-09

## 2024-01-09 RX ORDER — FLUTICASONE PROPIONATE AND SALMETEROL XINAFOATE 115; 21 UG/1; UG/1
AEROSOL, METERED RESPIRATORY (INHALATION)
Qty: 12 G | Refills: 2 | Status: SHIPPED | OUTPATIENT
Start: 2024-01-09

## 2024-01-16 ENCOUNTER — TELEPHONE (OUTPATIENT)
Dept: FAMILY MEDICINE CLINIC | Facility: CLINIC | Age: 49
End: 2024-01-16
Payer: MEDICARE

## 2024-01-16 NOTE — TELEPHONE ENCOUNTER
Caller: Maday Fung    Relationship: Self    Best call back number: [phone]     Caller requesting test results: YES    What test was performed: LUNG CANCER AND HEART TEST    When was the test performed: LAST WEEK    Where was the test performed: [facility] AND THEY HAVE SENT THESE TO JAYLA ROMANO. CALLER SAW JAYLA ROMANO IN THIS OFFICE ON 12.29.23.    Additional notes: PLEASE CALL PATIENT BACK AS SOON AS POSSIBLE.

## 2024-01-17 DIAGNOSIS — R05.3 PERSISTENT COUGH: ICD-10-CM

## 2024-01-17 DIAGNOSIS — J44.9 CHRONIC OBSTRUCTIVE PULMONARY DISEASE, UNSPECIFIED COPD TYPE: ICD-10-CM

## 2024-01-17 DIAGNOSIS — R06.00 DYSPNEA, UNSPECIFIED TYPE: Primary | ICD-10-CM

## 2024-01-17 DIAGNOSIS — R06.00 DYSPNEA, UNSPECIFIED TYPE: ICD-10-CM

## 2024-01-17 NOTE — TELEPHONE ENCOUNTER
Please find out more ab this. I saw this pt at [place]. Ordered imaging. I do not see result or reason for this call.

## 2024-01-17 NOTE — TELEPHONE ENCOUNTER
Called pt back to inquire- pt reports that a CT of chest was ordered and was told that this would show if she had cancer and if anything was wrong with her heart.     Advised pt that I have requested results and we will call her once they have been received and reviewed by provider- pt verbalized understanding.

## 2024-01-24 ENCOUNTER — TELEPHONE (OUTPATIENT)
Dept: FAMILY MEDICINE CLINIC | Facility: CLINIC | Age: 49
End: 2024-01-24
Payer: MEDICARE

## 2024-02-07 DIAGNOSIS — F41.1 GAD (GENERALIZED ANXIETY DISORDER): ICD-10-CM

## 2024-02-07 DIAGNOSIS — I10 HYPERTENSION, UNSPECIFIED TYPE: ICD-10-CM

## 2024-02-07 DIAGNOSIS — E78.2 MIXED HYPERLIPIDEMIA: ICD-10-CM

## 2024-02-07 RX ORDER — FUROSEMIDE 20 MG/1
TABLET ORAL
Qty: 90 TABLET | Refills: 1 | Status: SHIPPED | OUTPATIENT
Start: 2024-02-07

## 2024-02-07 RX ORDER — FLUOXETINE HYDROCHLORIDE 20 MG/1
CAPSULE ORAL
Qty: 180 CAPSULE | Refills: 1 | Status: SHIPPED | OUTPATIENT
Start: 2024-02-07

## 2024-02-07 RX ORDER — ROSUVASTATIN CALCIUM 10 MG/1
TABLET, COATED ORAL
Qty: 90 TABLET | Refills: 1 | Status: SHIPPED | OUTPATIENT
Start: 2024-02-07

## 2024-02-07 RX ORDER — POTASSIUM CHLORIDE 20 MEQ/1
20 TABLET, EXTENDED RELEASE ORAL 2 TIMES DAILY
Qty: 60 TABLET | Refills: 0 | Status: SHIPPED | OUTPATIENT
Start: 2024-02-07

## 2024-02-19 ENCOUNTER — TELEPHONE (OUTPATIENT)
Dept: FAMILY MEDICINE CLINIC | Facility: CLINIC | Age: 49
End: 2024-02-19
Payer: MEDICARE

## 2024-02-19 RX ORDER — POTASSIUM CHLORIDE 20 MEQ/1
20 TABLET, EXTENDED RELEASE ORAL 2 TIMES DAILY
Qty: 60 TABLET | Refills: 0 | Status: SHIPPED | OUTPATIENT
Start: 2024-02-19

## 2024-02-19 NOTE — TELEPHONE ENCOUNTER
Rx Refill Note  Requested Prescriptions     Pending Prescriptions Disp Refills    potassium chloride (K-DUR,KLOR-CON) 20 MEQ CR tablet [Pharmacy Med Name: POTASSIUM CHLORIDE DARÍO ER 20 TBCR] 60 tablet 0     Sig: TAKE ONE TABLET BY MOUTH TWICE A DAY      Last office visit with prescribing clinician: 12/14/2023   Last telemedicine visit with prescribing clinician: Visit date not found   Next office visit with prescribing clinician: Visit date not found           Rhea Arreaga MA  02/19/24, 08:48 CST

## 2024-02-19 NOTE — TELEPHONE ENCOUNTER
Called to make sure pt was gonna continue to see us as a PCP and if so to get her on the schedule.  I had to leave a  for her.

## 2024-03-06 ENCOUNTER — PATIENT ROUNDING (BHMG ONLY) (OUTPATIENT)
Dept: FAMILY MEDICINE CLINIC | Facility: CLINIC | Age: 49
End: 2024-03-06
Payer: MEDICARE

## 2024-03-06 ENCOUNTER — OFFICE VISIT (OUTPATIENT)
Dept: FAMILY MEDICINE CLINIC | Facility: CLINIC | Age: 49
End: 2024-03-06
Payer: MEDICARE

## 2024-03-06 VITALS
HEART RATE: 87 BPM | TEMPERATURE: 98 F | SYSTOLIC BLOOD PRESSURE: 122 MMHG | DIASTOLIC BLOOD PRESSURE: 78 MMHG | BODY MASS INDEX: 23.74 KG/M2 | OXYGEN SATURATION: 98 % | HEIGHT: 62 IN | RESPIRATION RATE: 18 BRPM | WEIGHT: 129 LBS

## 2024-03-06 DIAGNOSIS — Z86.73 HISTORY OF CVA (CEREBROVASCULAR ACCIDENT): ICD-10-CM

## 2024-03-06 DIAGNOSIS — R53.82 CHRONIC FATIGUE: ICD-10-CM

## 2024-03-06 DIAGNOSIS — F31.32 BIPOLAR AFFECTIVE DISORDER, CURRENTLY DEPRESSED, MODERATE: ICD-10-CM

## 2024-03-06 DIAGNOSIS — Z23 NEED FOR PNEUMOCOCCAL 20-VALENT CONJUGATE VACCINATION: ICD-10-CM

## 2024-03-06 DIAGNOSIS — R73.03 PREDIABETES: Primary | ICD-10-CM

## 2024-03-06 DIAGNOSIS — Z85.72 HISTORY OF LYMPHOMA: ICD-10-CM

## 2024-03-06 DIAGNOSIS — K59.03 DRUG-INDUCED CONSTIPATION: ICD-10-CM

## 2024-03-06 DIAGNOSIS — Z12.11 COLON CANCER SCREENING: ICD-10-CM

## 2024-03-06 DIAGNOSIS — R29.898 RIGHT LEG WEAKNESS: ICD-10-CM

## 2024-03-06 DIAGNOSIS — J44.9 CHRONIC OBSTRUCTIVE PULMONARY DISEASE, UNSPECIFIED COPD TYPE: ICD-10-CM

## 2024-03-06 DIAGNOSIS — F17.200 TOBACCO DEPENDENCE: ICD-10-CM

## 2024-03-06 DIAGNOSIS — E87.6 HYPOKALEMIA: ICD-10-CM

## 2024-03-06 RX ORDER — CYANOCOBALAMIN 1000 UG/ML
1000 INJECTION, SOLUTION INTRAMUSCULAR; SUBCUTANEOUS ONCE
Status: COMPLETED | OUTPATIENT
Start: 2024-03-06 | End: 2024-03-06

## 2024-03-06 RX ORDER — BENZONATATE 100 MG/1
100 CAPSULE ORAL 3 TIMES DAILY PRN
Qty: 40 CAPSULE | Refills: 2 | Status: SHIPPED | OUTPATIENT
Start: 2024-03-06

## 2024-03-06 RX ADMIN — CYANOCOBALAMIN 1000 MCG: 1000 INJECTION, SOLUTION INTRAMUSCULAR; SUBCUTANEOUS at 10:19

## 2024-03-06 NOTE — PROGRESS NOTES
"Chief Complaint   Patient presents with    [facility]     New patient         Subjective   Maday Fung is a 48 y.o. female who presents today to [facility].     HPI   She was a patient of Dr. Cabello who does not want to drive to [place] for a PCP. She has controlled blood pressure on medication.   She has had a past CVA that causes her not to understand certain things or follow directions.   She has a history of lymphoma. 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   She does smoke but states she is down to 5 cigarettes per day.     Allergies   Allergen Reactions    Hydrocodone-Acetaminophen Hives     She breaks out in hives, rash and her throat feels funny     Cefaclor Swelling    Penicillins Hives    Sulfa Antibiotics Other (See Comments)         OBJECTIVE:  Vitals:    03/06/24 0936   BP: 122/78   Pulse: 87   Resp: 18   Temp: 98 °F (36.7 °C)   TempSrc: Temporal   SpO2: 98%   Weight: 58.5 kg (129 lb)   Height: 157.5 cm (62\")     Physical Exam  Vitals and nursing note reviewed.   Constitutional:       Appearance: Normal appearance.   Cardiovascular:      Rate and Rhythm: Normal rate and regular rhythm.      Pulses: Normal pulses.      Heart sounds: Normal heart sounds.   Pulmonary:      Effort: Pulmonary effort is normal.      Breath sounds: Normal breath sounds.   Skin:     General: Skin is warm and dry.   Neurological:      General: No focal deficit present.      Mental Status: She is alert and oriented to person, place, and time.   Psychiatric:         Mood and Affect: Mood normal.         Speech: Speech is rapid and pressured.         Behavior: Behavior normal.         Thought Content: Thought content normal.         Judgment: Judgment normal.         BMI is within normal parameters. No other follow-up for BMI required.          ASSESSMENT/ PLAN:    Diagnoses and all orders for this visit:    1. " Prediabetes (Primary)    2. Need for pneumococcal 20-valent conjugate vaccination  -     Pneumococcal Conjugate Vaccine 20-Valent (PCV20)    3. Chronic obstructive pulmonary disease, unspecified COPD type  -     benzonatate (Tessalon Perles) 100 MG capsule; Take 1 capsule by mouth 3 (Three) Times a Day As Needed for Cough.  Dispense: 40 capsule; Refill: 2    4. Chronic fatigue  -     cyanocobalamin injection 1,000 mcg    5. History of lymphoma    6. Tobacco dependence    7. Right leg weakness    8. Bipolar affective disorder, currently depressed, moderate    9. Drug-induced constipation    10. History of CVA (cerebrovascular accident)    11. Colon cancer screening  -     Ambulatory Referral to Gastroenterology    12. Hypokalemia      Procedures     Management Plan:   Continue present medications.   An After Visit Summary was printed and given to the patient at discharge.    Follow-up: Return in about 2 weeks (around 3/20/2024) for Pap with [facility]; then she is due for medicare wellness with labs prior after June 20.         DEEPTHI Duncan 3/6/2024 10:28 CST  This note was electronically signed.

## 2024-03-06 NOTE — PROGRESS NOTES
"March 6, 2024    Hello, may I speak with Maday Fung?    My name is Tashia    I am  with [facility]  [address] [id number] [phone].    Before we get started may I verify your date of birth? 1975    I am calling to officially welcome you to our practice and ask about your recent visit. Is this a good time to talk? yes    Tell me about your visit with us. What things went well?  \"everything went wonderful!\"       We're always looking for ways to make our patients' experiences even better. Do you have recommendations on ways we may improve?  no    Overall were you satisfied with your first visit to our practice? yes       I appreciate you taking the time to speak with me today. Is there anything else I can do for you? no      Thank you, and have a great day.      "

## 2024-03-07 ENCOUNTER — PATIENT ROUNDING (BHMG ONLY) (OUTPATIENT)
Dept: FAMILY MEDICINE CLINIC | Facility: CLINIC | Age: 49
End: 2024-03-07
Payer: MEDICARE

## 2024-03-20 ENCOUNTER — TELEPHONE (OUTPATIENT)
Dept: FAMILY MEDICINE CLINIC | Facility: CLINIC | Age: 49
End: 2024-03-20

## 2024-03-20 NOTE — TELEPHONE ENCOUNTER
Caller: Maday Fung    Relationship: Self    Best call back number: [phone]     What was the call regarding:     PATIENT WANTED TO LET HER PROVIDER KNOW THAT SHE HAS AN APPOINTMENT WITH A GASTROLOGIST ON 04.23.24. PATIENT WILL BE SEEING CHICHI PIERCE.

## 2024-03-27 ENCOUNTER — HOSPITAL ENCOUNTER (OUTPATIENT)
Dept: VASCULAR LAB | Age: 49
Discharge: HOME | End: 2024-03-27
Payer: MEDICARE

## 2024-03-27 DIAGNOSIS — I70.213 ATHEROSCLER OF NATIVE ARTERY OF BOTH LEGS WITH INTERMIT CLAUDICATION (HCC): ICD-10-CM

## 2024-03-27 PROCEDURE — 93923 UPR/LXTR ART STDY 3+ LVLS: CPT

## 2024-03-28 ENCOUNTER — SCHEDULED TELEPHONE ENCOUNTER (OUTPATIENT)
Dept: VASCULAR SURGERY | Age: 49
End: 2024-03-28
Payer: MEDICARE

## 2024-03-28 DIAGNOSIS — I70.213 ATHEROSCLER OF NATIVE ARTERY OF BOTH LEGS WITH INTERMIT CLAUDICATION (HCC): Primary | ICD-10-CM

## 2024-03-28 PROCEDURE — 99442 PR PHYS/QHP TELEPHONE EVALUATION 11-20 MIN: CPT | Performed by: NURSE PRACTITIONER

## 2024-04-08 RX ORDER — DOXEPIN HYDROCHLORIDE 25 MG/1
25 CAPSULE ORAL EVERY EVENING
Qty: 90 CAPSULE | Refills: 1 | Status: SHIPPED | OUTPATIENT
Start: 2024-04-08

## 2024-04-08 RX ORDER — FLUTICASONE PROPIONATE AND SALMETEROL XINAFOATE 115; 21 UG/1; UG/1
AEROSOL, METERED RESPIRATORY (INHALATION)
Qty: 12 G | Refills: 2 | Status: SHIPPED | OUTPATIENT
Start: 2024-04-08

## 2024-04-08 NOTE — TELEPHONE ENCOUNTER
Rx Refill Note  Requested Prescriptions     Pending Prescriptions Disp Refills    doxepin (SINEquan) 25 MG capsule [Pharmacy Med Name: DOXEPIN HCL 25MG CAPS] 90 capsule 1     Sig: TAKE ONE CAPSULE BY MOUTH EVERY EVENING        Jesica Mccloud MA  04/08/24, 08:59 CDT

## 2024-04-08 NOTE — TELEPHONE ENCOUNTER
Rx Refill Note  Requested Prescriptions     Pending Prescriptions Disp Refills    Advair -21 MCG/ACT inhaler [Pharmacy Med Name: ADVAIR -21MCG/ACT AERO] 12 g 2     Sig: INHALE TWO PUFFS BY MOUTH TWICE A DAY       Jesica Mccloud MA  04/08/24, 09:00 CDT

## 2024-04-23 ENCOUNTER — OFFICE VISIT (OUTPATIENT)
Dept: GASTROENTEROLOGY | Facility: CLINIC | Age: 49
End: 2024-04-23
Payer: MEDICARE

## 2024-04-23 VITALS
TEMPERATURE: 97 F | DIASTOLIC BLOOD PRESSURE: 74 MMHG | SYSTOLIC BLOOD PRESSURE: 126 MMHG | OXYGEN SATURATION: 96 % | HEIGHT: 62 IN | WEIGHT: 129 LBS | HEART RATE: 90 BPM | BODY MASS INDEX: 23.74 KG/M2

## 2024-04-23 DIAGNOSIS — Z12.11 ENCOUNTER FOR SCREENING FOR MALIGNANT NEOPLASM OF COLON: Primary | ICD-10-CM

## 2024-04-23 PROCEDURE — 1160F RVW MEDS BY RX/DR IN RCRD: CPT | Performed by: NURSE PRACTITIONER

## 2024-04-23 PROCEDURE — S0260 H&P FOR SURGERY: HCPCS | Performed by: NURSE PRACTITIONER

## 2024-04-23 PROCEDURE — 1159F MED LIST DOCD IN RCRD: CPT | Performed by: NURSE PRACTITIONER

## 2024-04-23 RX ORDER — POTASSIUM CHLORIDE 750 MG/1
10 TABLET, FILM COATED, EXTENDED RELEASE ORAL 2 TIMES DAILY
COMMUNITY

## 2024-04-23 NOTE — PROGRESS NOTES
Chief Complaint   Patient presents with    Colonoscopy     Screening colon       PCP: Shanique Saeed APRN  REFER: Shanique Saeed APRN    Subjective     HPI    Maday Fung is a 48 y.o. female who presents to office for preventative maintenance.  There is not  a personal history of colon polyps.   She does not have complaints of nausea/vomiting, change in bowels, weight loss, no BRBPR, no melena.  There is not a family history of colon cancer in first degree relative.  There is not a family history of colon polyps in first degree relative.  Maday Fung last colonoscopy-no previous colonoscopy .  Bowels do not move on regular basis.  This is chronic.         Lab Results - Last 18 Months   Lab Units 12/04/23  0842 11/07/23  1330 11/01/23  1313 04/24/23  0914   GLUCOSE mg/dL 142* 87 108* 96   SODIUM mmol/L 140 139 138 141   POTASSIUM mmol/L 4.4 3.2* 2.5* 4.1   CREATININE mg/dL 0.85 0.87 0.99 0.78   BUN mg/dL 8 11 15 13   BUN / CREAT RATIO  9 12.6 15 17   ALK PHOS IU/L 82 98 159* 87   ALT (SGPT) IU/L 12 23 25 11   AST (SGOT) IU/L 19 31 43* 16   BILIRUBIN mg/dL 0.4 0.2 0.7 0.6   ALBUMIN g/dL 4.4 3.4* 3.5* 4.5       Lab Results - Last 18 Months   Lab Units 12/04/23  0842 11/01/23  1313 04/24/23  0914   EGFR RESULT mL/min/1.73 84 70 94        Past Medical History:   Diagnosis Date    Cancer     COPD (chronic obstructive pulmonary disease)     Generalized anxiety disorder 1/25/2021    H/O Ross procedure     Hyperlipidemia     Hypertension     Insomnia, unspecified 1/25/2021    Low back pain     Pulmonary embolism     Vitamin D deficiency 1/25/2021     Past Surgical History:   Procedure Laterality Date    DILATATION AND CURETTAGE      HYSTERECTOMY      Full    ROSS KONNO PROCEDURE       Outpatient Medications Marked as Taking for the 4/23/24 encounter (Office Visit) with Rehan Tate APRN   Medication Sig Dispense Refill    Advair -21 MCG/ACT inhaler INHALE TWO PUFFS BY MOUTH TWICE A DAY 12 g  2    albuterol (ACCUNEB) 1.25 MG/3ML nebulizer solution Inhale 3 mL.      albuterol sulfate  (90 Base) MCG/ACT inhaler Inhale 2 puffs.      chlorhexidine (HIBICLENS) 4 % external liquid Apply  topically to the appropriate area as directed Daily As Needed for Wound Care. 3790 mL 1    cholecalciferol (VITAMIN D3) 10 MCG (400 UNIT) tablet Take 1 tablet by mouth.      cilostazol (PLETAL) 100 MG tablet TAKE ONE TABLET BY MOUTH TWICE A DAY 60 tablet 5    cloNIDine (CATAPRES) 0.1 MG tablet TAKE ONE TABLET BY MOUTH TWICE A DAY AS NEEDED 90 tablet 2    doxepin (SINEquan) 25 MG capsule TAKE ONE CAPSULE BY MOUTH EVERY EVENING 90 capsule 1    FLUoxetine (PROzac) 20 MG capsule TAKE TWO CAPSULES BY MOUTH EVERY DAY NEED APPOINTMENT FOR FURTHER REFILLS 180 capsule 1    folic acid (FOLVITE) 400 MCG tablet Take 1 tablet by mouth Daily.      folic acid-vit B6-vit B12 (FOLGARD) 2.2-25-1 MG tablet tablet Take  by mouth Daily.      furosemide (LASIX) 20 MG tablet TAKE ONE TABLET BY MOUTH EVERY DAY 90 tablet 1    ipratropium-albuterol (DUO-NEB) 0.5-2.5 mg/3 ml nebulizer Take 3 mL by nebulization Every 4 (Four) Hours As Needed for Wheezing. 360 mL 5    lactulose (CHRONULAC) 10 GM/15ML solution Take 15 mL by mouth.      mupirocin (BACTROBAN) 2 % cream Apply 1 application  topically to the appropriate area as directed 3 (Three) Times a Day. 30 g 1    oxyCODONE-acetaminophen (PERCOCET)  MG per tablet Take 1 tablet by mouth Every 6 (Six) Hours As Needed for Moderate Pain.      potassium chloride (K-DUR,KLOR-CON) 20 MEQ CR tablet TAKE ONE TABLET BY MOUTH TWICE A DAY 60 tablet 0    potassium chloride 10 MEQ CR tablet Take 1 tablet by mouth 2 (Two) Times a Day.      rosuvastatin (CRESTOR) 10 MG tablet TAKE ONE TABLET BY MOUTH EVERY DAY (NEED APPOINTMENT FOR FURTHER REFILLS) 90 tablet 1    sennosides-docusate (PERICOLACE) 8.6-50 MG per tablet Take 1 tablet by mouth.       Current Facility-Administered Medications for the 4/23/24  encounter (Office Visit) with Rehan Tate APRN   Medication Dose Route Frequency Provider Last Rate Last Admin    cyanocobalamin injection 1,000 mcg  1,000 mcg Intramuscular Q28 Days Hadley Meza MD   1,000 mcg at 12/15/23 0906     Allergies   Allergen Reactions    Hydrocodone-Acetaminophen Hives     She breaks out in hives, rash and her throat feels funny     Cefaclor Swelling    Penicillins Hives    Sulfa Antibiotics Other (See Comments)     Social History     Socioeconomic History    Marital status: Single   Tobacco Use    Smoking status: Every Day     Current packs/day: 0.50     Types: Cigarettes     Passive exposure: Current    Smokeless tobacco: Never   Vaping Use    Vaping status: Never Used   Substance and Sexual Activity    Alcohol use: Not Currently    Drug use: Never    Sexual activity: Defer     Review of Systems   Constitutional:  Negative for fever and unexpected weight change.   HENT:  Negative for trouble swallowing.    Respiratory:  Negative for shortness of breath.    Cardiovascular:  Negative for chest pain.   Gastrointestinal:  Negative for abdominal pain and anal bleeding.     Objective   Vitals:    04/23/24 1251   BP: 126/74   Pulse: 90   Temp: 97 °F (36.1 °C)   SpO2: 96%     Physical Exam  Constitutional:       Appearance: Normal appearance. She is well-developed.   Eyes:      General: No scleral icterus.  Cardiovascular:      Heart sounds: Normal heart sounds. No murmur heard.  Pulmonary:      Effort: Pulmonary effort is normal.   Abdominal:      General: Bowel sounds are normal. There is no distension.      Palpations: Abdomen is soft.      Tenderness: There is no abdominal tenderness. There is no guarding.   Skin:     General: Skin is warm and dry.      Coloration: Skin is not jaundiced.   Neurological:      Mental Status: She is alert.   Psychiatric:         Behavior: Behavior is cooperative.       Imaging Results (Most Recent)       None          Body mass index is 23.59  kg/m².    Assessment & Plan   Diagnoses and all orders for this visit:    1. Encounter for screening for malignant neoplasm of colon (Primary)  -     Case Request; Standing  -     Implement Anesthesia Orders Day of Procedure; Standing  -     Obtain Informed Consent; Standing  -     Case Request      COLONOSCOPY WITH ANESTHESIA (N/A)    Recommend daily use of Miralax, adjust as needed  Encouraged addition of dietary fiber, increasing daily water consumption, as well daily physical activity    Could consider Amitiza or Movantik to help improve bowel movement after colonoscopy findings    Advised pt to stop use of NSAIDs, Fish Oil, and MV 5 days prior to procedure, per Dr Vieyra protocol.  Tylenol based products are ok to take.  Pt verbalized understanding.     All risks, benefits, alternatives, and indications of colonoscopy procedure have been discussed with the patient. Risks to include perforation of the colon requiring possible surgery or colostomy, risk of bleeding from biopsies or removal of colon tissue, possibility of missing a colon polyp or cancer, or adverse drug reaction.  Benefits to include the diagnosis and management of disease of the colon and rectum. Alternatives to include barium enema, radiographic evaluation, lab testing or no intervention. She verbalizes understanding and agrees.         Rehan Tate, APRN  04/23/24        There are no Patient Instructions on file for this visit.

## 2024-05-09 ENCOUNTER — ANESTHESIA (OUTPATIENT)
Dept: GASTROENTEROLOGY | Facility: HOSPITAL | Age: 49
End: 2024-05-09
Payer: MEDICARE

## 2024-05-09 ENCOUNTER — ANESTHESIA EVENT (OUTPATIENT)
Dept: GASTROENTEROLOGY | Facility: HOSPITAL | Age: 49
End: 2024-05-09
Payer: MEDICARE

## 2024-05-09 ENCOUNTER — HOSPITAL ENCOUNTER (OUTPATIENT)
Facility: HOSPITAL | Age: 49
Setting detail: HOSPITAL OUTPATIENT SURGERY
Discharge: HOME | End: 2024-05-09
Attending: INTERNAL MEDICINE | Admitting: INTERNAL MEDICINE
Payer: MEDICARE

## 2024-05-09 VITALS
TEMPERATURE: 97.1 F | WEIGHT: 129 LBS | HEIGHT: 61 IN | HEART RATE: 82 BPM | RESPIRATION RATE: 16 BRPM | DIASTOLIC BLOOD PRESSURE: 50 MMHG | SYSTOLIC BLOOD PRESSURE: 100 MMHG | OXYGEN SATURATION: 99 % | BODY MASS INDEX: 24.35 KG/M2

## 2024-05-09 DIAGNOSIS — Z12.11 ENCOUNTER FOR SCREENING FOR MALIGNANT NEOPLASM OF COLON: ICD-10-CM

## 2024-05-09 PROCEDURE — 25010000002 PROPOFOL 10 MG/ML EMULSION: Performed by: NURSE ANESTHETIST, CERTIFIED REGISTERED

## 2024-05-09 PROCEDURE — 25810000003 SODIUM CHLORIDE 0.9 % SOLUTION: Performed by: ANESTHESIOLOGY

## 2024-05-09 PROCEDURE — G0121 COLON CA SCRN NOT HI RSK IND: HCPCS | Performed by: INTERNAL MEDICINE

## 2024-05-09 RX ORDER — SODIUM CHLORIDE 0.9 % (FLUSH) 0.9 %
10 SYRINGE (ML) INJECTION AS NEEDED
Status: CANCELLED | OUTPATIENT
Start: 2024-05-09

## 2024-05-09 RX ORDER — SODIUM CHLORIDE 9 MG/ML
500 INJECTION, SOLUTION INTRAVENOUS CONTINUOUS PRN
Status: DISCONTINUED | OUTPATIENT
Start: 2024-05-09 | End: 2024-05-09 | Stop reason: HOSPADM

## 2024-05-09 RX ORDER — SODIUM CHLORIDE 0.9 % (FLUSH) 0.9 %
10 SYRINGE (ML) INJECTION AS NEEDED
Status: DISCONTINUED | OUTPATIENT
Start: 2024-05-09 | End: 2024-05-09 | Stop reason: HOSPADM

## 2024-05-09 RX ORDER — SODIUM CHLORIDE 9 MG/ML
100 INJECTION, SOLUTION INTRAVENOUS CONTINUOUS
Status: CANCELLED | OUTPATIENT
Start: 2024-05-09

## 2024-05-09 RX ORDER — PROPOFOL 10 MG/ML
VIAL (ML) INTRAVENOUS AS NEEDED
Status: DISCONTINUED | OUTPATIENT
Start: 2024-05-09 | End: 2024-05-09 | Stop reason: SURG

## 2024-05-09 RX ORDER — LIDOCAINE HYDROCHLORIDE 10 MG/ML
0.5 INJECTION, SOLUTION EPIDURAL; INFILTRATION; INTRACAUDAL; PERINEURAL ONCE AS NEEDED
Status: DISCONTINUED | OUTPATIENT
Start: 2024-05-09 | End: 2024-05-09 | Stop reason: HOSPADM

## 2024-05-09 RX ORDER — SODIUM CHLORIDE 9 MG/ML
40 INJECTION, SOLUTION INTRAVENOUS AS NEEDED
Status: CANCELLED | OUTPATIENT
Start: 2024-05-09

## 2024-05-09 RX ORDER — SODIUM CHLORIDE 0.9 % (FLUSH) 0.9 %
10 SYRINGE (ML) INJECTION EVERY 12 HOURS SCHEDULED
Status: CANCELLED | OUTPATIENT
Start: 2024-05-09

## 2024-05-09 RX ORDER — LIDOCAINE HYDROCHLORIDE 20 MG/ML
INJECTION, SOLUTION EPIDURAL; INFILTRATION; INTRACAUDAL; PERINEURAL AS NEEDED
Status: DISCONTINUED | OUTPATIENT
Start: 2024-05-09 | End: 2024-05-09 | Stop reason: SURG

## 2024-05-09 RX ADMIN — SODIUM CHLORIDE: 0.9 INJECTION, SOLUTION INTRAVENOUS at 11:00

## 2024-05-09 RX ADMIN — PROPOFOL 200 MG: 10 INJECTION, EMULSION INTRAVENOUS at 11:01

## 2024-05-09 RX ADMIN — LIDOCAINE HYDROCHLORIDE 60 MG: 20 INJECTION, SOLUTION EPIDURAL; INFILTRATION; INTRACAUDAL; PERINEURAL at 11:01

## 2024-05-29 ENCOUNTER — TELEPHONE (OUTPATIENT)
Dept: FAMILY MEDICINE CLINIC | Facility: CLINIC | Age: 49
End: 2024-05-29
Payer: MEDICARE

## 2024-05-29 DIAGNOSIS — A49.02 MRSA (METHICILLIN RESISTANT STAPHYLOCOCCUS AUREUS): Primary | ICD-10-CM

## 2024-05-29 RX ORDER — DOXYCYCLINE HYCLATE 100 MG/1
100 CAPSULE ORAL 2 TIMES DAILY
Qty: 20 CAPSULE | Refills: 0 | Status: SHIPPED | OUTPATIENT
Start: 2024-05-29

## 2024-05-29 NOTE — TELEPHONE ENCOUNTER
Pt called stating she was prescibed an antibiotic by Dr. Meza for her [place] outbreaks but is currently compleltly out. She states she does not know the name of the medication but has to go to a  in a couple days and would like to have this medication so she does not have another outbreak there. If approved she would like this to be sent to [facility].

## 2024-05-30 NOTE — TELEPHONE ENCOUNTER
PT requesting 20-40 additional quantity of antibiotic be sent in so she can keep on hand, states  use to send in 40-60 at a time. Please advice

## 2024-06-07 DIAGNOSIS — I10 HYPERTENSION, UNSPECIFIED TYPE: ICD-10-CM

## 2024-06-07 RX ORDER — CILOSTAZOL 100 MG/1
100 TABLET ORAL 2 TIMES DAILY
Qty: 60 TABLET | Refills: 5 | OUTPATIENT
Start: 2024-06-07

## 2024-06-07 RX ORDER — FUROSEMIDE 20 MG/1
TABLET ORAL
Qty: 90 TABLET | Refills: 1 | OUTPATIENT
Start: 2024-06-07

## 2024-06-07 NOTE — TELEPHONE ENCOUNTER
Rx Refill Note  Requested Prescriptions     Pending Prescriptions Disp Refills    cilostazol (PLETAL) 100 MG tablet [Pharmacy Med Name: CILOSTAZOL 100MG TABS] 60 tablet 5     Sig: TAKE ONE TABLET BY MOUTH TWICE A DAY    furosemide (LASIX) 20 MG tablet [Pharmacy Med Name: FUROSEMIDE 20MG TABS] 90 tablet 1     Sig: TAKE ONE TABLET BY MOUTH EVERY DAY      Last office visit with prescribing clinician: 12/14/2023     Next office visit with prescribing clinician: Visit date not found       Lilia Giron MA  06/07/24, 13:55 CDT

## 2024-06-10 DIAGNOSIS — I10 HYPERTENSION, UNSPECIFIED TYPE: ICD-10-CM

## 2024-06-11 NOTE — TELEPHONE ENCOUNTER
Rx Refill Note  Requested Prescriptions     Pending Prescriptions Disp Refills    furosemide (LASIX) 20 MG tablet [Pharmacy Med Name: FUROSEMIDE 20MG TABS] 90 tablet 1     Sig: TAKE ONE TABLET BY MOUTH EVERY DAY    cilostazol (PLETAL) 100 MG tablet 60 tablet 5     Sig: Take 1 tablet by mouth 2 (Two) Times a Day.      Last office visit with prescribing clinician: 3/6/2024     Next office visit with prescribing clinician: 6/27/2024       Lilia Giron MA  06/11/24, 11:46 CDT

## 2024-06-12 RX ORDER — FUROSEMIDE 20 MG/1
TABLET ORAL
Qty: 90 TABLET | Refills: 1 | Status: SHIPPED | OUTPATIENT
Start: 2024-06-12

## 2024-06-12 RX ORDER — CILOSTAZOL 100 MG/1
100 TABLET ORAL 2 TIMES DAILY
Qty: 60 TABLET | Refills: 5 | Status: SHIPPED | OUTPATIENT
Start: 2024-06-12

## 2024-07-01 ENCOUNTER — LAB (OUTPATIENT)
Dept: FAMILY MEDICINE CLINIC | Facility: CLINIC | Age: 49
End: 2024-07-01
Payer: MEDICARE

## 2024-07-01 DIAGNOSIS — R53.83 OTHER FATIGUE: ICD-10-CM

## 2024-07-01 DIAGNOSIS — E78.2 MIXED HYPERLIPIDEMIA: Primary | ICD-10-CM

## 2024-07-01 DIAGNOSIS — I10 HYPERTENSION, UNSPECIFIED TYPE: ICD-10-CM

## 2024-07-01 DIAGNOSIS — R73.03 PREDIABETES: Primary | ICD-10-CM

## 2024-07-01 DIAGNOSIS — Z00.00 MEDICARE ANNUAL WELLNESS VISIT, SUBSEQUENT: ICD-10-CM

## 2024-07-02 LAB
ALBUMIN SERPL-MCNC: 4.4 G/DL (ref 3.9–4.9)
ALP SERPL-CCNC: 77 IU/L (ref 44–121)
ALT SERPL-CCNC: 11 IU/L (ref 0–32)
AST SERPL-CCNC: 16 IU/L (ref 0–40)
BASOPHILS # BLD AUTO: 0.1 X10E3/UL (ref 0–0.2)
BASOPHILS NFR BLD AUTO: 1 %
BILIRUB SERPL-MCNC: <0.2 MG/DL (ref 0–1.2)
BUN SERPL-MCNC: 15 MG/DL (ref 6–24)
BUN/CREAT SERPL: 17 (ref 9–23)
CALCIUM SERPL-MCNC: 9.5 MG/DL (ref 8.7–10.2)
CHLORIDE SERPL-SCNC: 105 MMOL/L (ref 96–106)
CHOLEST SERPL-MCNC: 138 MG/DL (ref 100–199)
CO2 SERPL-SCNC: 24 MMOL/L (ref 20–29)
CREAT SERPL-MCNC: 0.86 MG/DL (ref 0.57–1)
EGFRCR SERPLBLD CKD-EPI 2021: 83 ML/MIN/1.73
EOSINOPHIL # BLD AUTO: 0.2 X10E3/UL (ref 0–0.4)
EOSINOPHIL NFR BLD AUTO: 3 %
ERYTHROCYTE [DISTWIDTH] IN BLOOD BY AUTOMATED COUNT: 12.8 % (ref 11.7–15.4)
GLOBULIN SER CALC-MCNC: 2.5 G/DL (ref 1.5–4.5)
GLUCOSE SERPL-MCNC: 86 MG/DL (ref 70–99)
HBA1C MFR BLD: 6.1 % (ref 4.8–5.6)
HCT VFR BLD AUTO: 42.2 % (ref 34–46.6)
HDLC SERPL-MCNC: 60 MG/DL
HGB BLD-MCNC: 14.2 G/DL (ref 11.1–15.9)
IMM GRANULOCYTES # BLD AUTO: 0 X10E3/UL (ref 0–0.1)
IMM GRANULOCYTES NFR BLD AUTO: 0 %
LDLC SERPL CALC-MCNC: 60 MG/DL (ref 0–99)
LDLC/HDLC SERPL: 1 RATIO (ref 0–3.2)
LYMPHOCYTES # BLD AUTO: 2.4 X10E3/UL (ref 0.7–3.1)
LYMPHOCYTES NFR BLD AUTO: 37 %
MCH RBC QN AUTO: 33.4 PG (ref 26.6–33)
MCHC RBC AUTO-ENTMCNC: 33.6 G/DL (ref 31.5–35.7)
MCV RBC AUTO: 99 FL (ref 79–97)
MONOCYTES # BLD AUTO: 0.7 X10E3/UL (ref 0.1–0.9)
MONOCYTES NFR BLD AUTO: 10 %
NEUTROPHILS # BLD AUTO: 3.1 X10E3/UL (ref 1.4–7)
NEUTROPHILS NFR BLD AUTO: 49 %
PLATELET # BLD AUTO: 320 X10E3/UL (ref 150–450)
POTASSIUM SERPL-SCNC: 3.9 MMOL/L (ref 3.5–5.2)
PROT SERPL-MCNC: 6.9 G/DL (ref 6–8.5)
RBC # BLD AUTO: 4.25 X10E6/UL (ref 3.77–5.28)
SODIUM SERPL-SCNC: 140 MMOL/L (ref 134–144)
T4 SERPL-MCNC: 6.5 UG/DL (ref 4.5–12)
TRIGL SERPL-MCNC: 100 MG/DL (ref 0–149)
TSH SERPL DL<=0.005 MIU/L-ACNC: 2.22 UIU/ML (ref 0.45–4.5)
VLDLC SERPL CALC-MCNC: 18 MG/DL (ref 5–40)
WBC # BLD AUTO: 6.4 X10E3/UL (ref 3.4–10.8)

## 2024-07-05 RX ORDER — FLUTICASONE PROPIONATE AND SALMETEROL XINAFOATE 115; 21 UG/1; UG/1
AEROSOL, METERED RESPIRATORY (INHALATION)
Qty: 12 G | Refills: 2 | Status: SHIPPED | OUTPATIENT
Start: 2024-07-05

## 2024-07-05 NOTE — TELEPHONE ENCOUNTER
Rx Refill Note  Requested Prescriptions     Pending Prescriptions Disp Refills    Advair -21 MCG/ACT inhaler [Pharmacy Med Name: ADVAIR -21MCG/ACT AERO] 12 g 2     Sig: INHALE TWO PUFFS BY MOUTH TWICE A DAY        Jesica Mccloud MA  07/05/24, 10:57 CDT

## 2024-08-05 DIAGNOSIS — E78.2 MIXED HYPERLIPIDEMIA: ICD-10-CM

## 2024-08-05 DIAGNOSIS — F41.1 GAD (GENERALIZED ANXIETY DISORDER): ICD-10-CM

## 2024-08-05 RX ORDER — FLUOXETINE HYDROCHLORIDE 20 MG/1
CAPSULE ORAL
Qty: 60 CAPSULE | Refills: 0 | Status: SHIPPED | OUTPATIENT
Start: 2024-08-05

## 2024-08-05 RX ORDER — ROSUVASTATIN CALCIUM 10 MG/1
TABLET, COATED ORAL
Qty: 30 TABLET | Refills: 0 | Status: SHIPPED | OUTPATIENT
Start: 2024-08-05

## 2024-08-05 NOTE — TELEPHONE ENCOUNTER
Rx Refill Note  Requested Prescriptions     Pending Prescriptions Disp Refills    rosuvastatin (CRESTOR) 10 MG tablet [Pharmacy Med Name: ROSUVASTATIN CALCIUM 10MG TABS] 90 tablet 1     Sig: TAKE ONE TABLET BY MOUTH EVERY DAY ( NEED APPOINTMENT FOR FURTHER REFILLS )    FLUoxetine (PROzac) 20 MG capsule [Pharmacy Med Name: FLUOXETINE HCL 20MG CAPS] 180 capsule 1     Sig: TAKE TWO CAPSULES BY MOUTH EVERY DAY ( NEED APPOINTMENT FOR FURTHER REFILLS )      Last office visit with prescribing clinician: 3/26/24   Last telemedicine visit with prescribing clinician: Visit date not found   Next office visit with prescribing clinician:     {TIP  Please add Last Relevant Lab 7/1/24    Mayra Gerber MA  08/05/24, 15:07 CDT

## 2024-08-21 ENCOUNTER — OFFICE VISIT (OUTPATIENT)
Dept: FAMILY MEDICINE CLINIC | Facility: CLINIC | Age: 49
End: 2024-08-21
Payer: MEDICARE

## 2024-08-21 VITALS
DIASTOLIC BLOOD PRESSURE: 80 MMHG | RESPIRATION RATE: 19 BRPM | BODY MASS INDEX: 24.32 KG/M2 | TEMPERATURE: 97.7 F | WEIGHT: 128.8 LBS | HEART RATE: 110 BPM | OXYGEN SATURATION: 95 % | HEIGHT: 61 IN | SYSTOLIC BLOOD PRESSURE: 140 MMHG

## 2024-08-21 DIAGNOSIS — F31.32 BIPOLAR AFFECTIVE DISORDER, CURRENTLY DEPRESSED, MODERATE: ICD-10-CM

## 2024-08-21 DIAGNOSIS — F51.01 PRIMARY INSOMNIA: ICD-10-CM

## 2024-08-21 DIAGNOSIS — E53.8 B12 DEFICIENCY: ICD-10-CM

## 2024-08-21 DIAGNOSIS — R53.83 FATIGUE, UNSPECIFIED TYPE: ICD-10-CM

## 2024-08-21 DIAGNOSIS — Z12.31 BREAST CANCER SCREENING BY MAMMOGRAM: ICD-10-CM

## 2024-08-21 DIAGNOSIS — A49.02 MRSA (METHICILLIN RESISTANT STAPHYLOCOCCUS AUREUS): ICD-10-CM

## 2024-08-21 DIAGNOSIS — Z00.00 MEDICARE ANNUAL WELLNESS VISIT, SUBSEQUENT: Primary | ICD-10-CM

## 2024-08-21 PROBLEM — E66.3 OVERWEIGHT WITH BODY MASS INDEX (BMI) OF 26 TO 26.9 IN ADULT: Status: RESOLVED | Noted: 2021-12-20 | Resolved: 2024-08-21

## 2024-08-21 RX ORDER — DOXYCYCLINE HYCLATE 100 MG/1
100 CAPSULE ORAL 2 TIMES DAILY
Qty: 20 CAPSULE | Refills: 0 | Status: SHIPPED | OUTPATIENT
Start: 2024-08-21

## 2024-08-21 RX ORDER — DOXEPIN HYDROCHLORIDE 100 MG/1
100 CAPSULE ORAL EVERY EVENING
Qty: 30 CAPSULE | Refills: 2 | Status: SHIPPED | OUTPATIENT
Start: 2024-08-21

## 2024-08-21 RX ORDER — CYANOCOBALAMIN 1000 UG/ML
1000 INJECTION, SOLUTION INTRAMUSCULAR; SUBCUTANEOUS ONCE
Status: COMPLETED | OUTPATIENT
Start: 2024-08-21 | End: 2024-08-21

## 2024-08-21 RX ADMIN — CYANOCOBALAMIN 1000 MCG: 1000 INJECTION, SOLUTION INTRAMUSCULAR; SUBCUTANEOUS at 16:33

## 2024-08-21 NOTE — PROGRESS NOTES
" Subjective   The ABCs of the Annual Wellness Visit  Medicare Wellness Visit      Maday Fung is a 48 y.o. patient who presents for a Medicare Wellness Visit.    The following portions of the patient's history were reviewed and   updated as appropriate: {history reviewed:20406::\"allergies\",\"current medications\",\"past family history\",\"past medical history\",\"past social history\",\"past surgical history\",\"problem list\"}.    Compared to one year ago, the patient's physical   health is {better worse same:15514}.  Compared to one year ago, the patient's mental   health is {better worse same:42514}.    Recent Hospitalizations:  {Hospital Admission Status in the last 365 days:80292}    Current Medical Providers:  Patient Care Team:  Shanique Saeed APRN as PCP - General (Family Medicine)  Margret Wright APRN as Nurse Practitioner (Nurse Practitioner)    Outpatient Medications Prior to Visit   Medication Sig Dispense Refill    Advair -21 MCG/ACT inhaler INHALE TWO PUFFS BY MOUTH TWICE A DAY 12 g 2    albuterol (ACCUNEB) 1.25 MG/3ML nebulizer solution Inhale 3 mL.      albuterol sulfate  (90 Base) MCG/ACT inhaler Inhale 2 puffs.      chlorhexidine (HIBICLENS) 4 % external liquid Apply  topically to the appropriate area as directed Daily As Needed for Wound Care. 3790 mL 1    cholecalciferol (VITAMIN D3) 10 MCG (400 UNIT) tablet Take 1 tablet by mouth.      cilostazol (PLETAL) 100 MG tablet Take 1 tablet by mouth 2 (Two) Times a Day. 60 tablet 5    cloNIDine (CATAPRES) 0.1 MG tablet TAKE ONE TABLET BY MOUTH TWICE A DAY AS NEEDED 90 tablet 2    doxepin (SINEquan) 25 MG capsule TAKE ONE CAPSULE BY MOUTH EVERY EVENING 90 capsule 1    doxycycline (VIBRAMYCIN) 100 MG capsule Take 1 capsule by mouth 2 (Two) Times a Day. 20 capsule 0    FLUoxetine (PROzac) 20 MG capsule TAKE TWO CAPSULES BY MOUTH EVERY DAY ( NEED APPOINTMENT FOR FURTHER REFILLS ) 60 capsule 0    folic acid (FOLVITE) 400 MCG tablet Take 1 " tablet by mouth Daily.      folic acid-vit B6-vit B12 (FOLGARD) 2.2-25-1 MG tablet tablet Take  by mouth Daily.      furosemide (LASIX) 20 MG tablet TAKE ONE TABLET BY MOUTH EVERY DAY 90 tablet 1    ipratropium-albuterol (DUO-NEB) 0.5-2.5 mg/3 ml nebulizer Take 3 mL by nebulization Every 4 (Four) Hours As Needed for Wheezing. 360 mL 5    lactulose (CHRONULAC) 10 GM/15ML solution Take 15 mL by mouth.      mupirocin (BACTROBAN) 2 % cream Apply 1 application  topically to the appropriate area as directed 3 (Three) Times a Day. 30 g 1    oxyCODONE-acetaminophen (PERCOCET)  MG per tablet Take 1 tablet by mouth Every 6 (Six) Hours As Needed for Moderate Pain.      potassium chloride (K-DUR,KLOR-CON) 20 MEQ CR tablet TAKE ONE TABLET BY MOUTH TWICE A DAY 60 tablet 0    potassium chloride 10 MEQ CR tablet Take 1 tablet by mouth 2 (Two) Times a Day.      rosuvastatin (CRESTOR) 10 MG tablet TAKE ONE TABLET BY MOUTH EVERY DAY ( NEED APPOINTMENT FOR FURTHER REFILLS ) 30 tablet 0    sennosides-docusate (PERICOLACE) 8.6-50 MG per tablet Take 1 tablet by mouth.      benzonatate (Tessalon Perles) 100 MG capsule Take 1 capsule by mouth 3 (Three) Times a Day As Needed for Cough. (Patient not taking: Reported on 8/21/2024) 40 capsule 2     Facility-Administered Medications Prior to Visit   Medication Dose Route Frequency Provider Last Rate Last Admin    cyanocobalamin injection 1,000 mcg  1,000 mcg Intramuscular Q28 Days Hadley Meza MD   1,000 mcg at 12/15/23 0906     Opioid medication/s are on active medication list.  and I have evaluated her active treatment plan and pain score trends (see table).  There were no vitals filed for this visit.  I have reviewed the chart for potential of high risk medication and harmful drug interactions in the elderly.        Aspirin is not on active medication list.  {ASPIRIN NOT ON MEDICATION LIST INDICATED/NOT INDICATED:42899}.    Patient Active Problem List   Diagnosis    Chronic systolic  "CHF (congestive heart failure)    Vitamin D deficiency    Insomnia, unspecified    Generalized anxiety disorder    Chronic left-sided back pain    Tobacco dependence    Overweight with body mass index (BMI) of 26 to 26.9 in adult    Degeneration of cervical intervertebral disc    Prediabetes    History of cervical dysplasia    COPD with acute exacerbation    Encounter for screening for malignant neoplasm of colon     Advance Care Planning {Advance Care Planning Hyperlink:23}Advance Directive is not on file.  {ACP Discussion, Advance Directive not in EMR:10509}            Objective   Vitals:    08/21/24 1502 08/21/24 1503   BP: 143/85 140/80   Pulse: 110    Resp: 19    Temp: 97.7 °F (36.5 °C)    TempSrc: Skin    SpO2: 95%    Weight: 58.4 kg (128 lb 12.8 oz)    Height: 154.9 cm (60.98\")        Estimated body mass index is 24.35 kg/m² as calculated from the following:    Height as of this encounter: 154.9 cm (60.98\").    Weight as of this encounter: 58.4 kg (128 lb 12.8 oz).    BMI is within normal parameters. No other follow-up for BMI required.       Does the patient have evidence of cognitive impairment? {Yes/No:09812}  Lab Results   Component Value Date    CHLPL 138 07/01/2024    TRIG 100 07/01/2024    HDL 60 07/01/2024    LDL 60 07/01/2024    VLDL 18 07/01/2024    HGBA1C 6.1 (H) 07/01/2024                                                                                               Health  Risk Assessment    Smoking Status:  Social History     Tobacco Use   Smoking Status Every Day    Current packs/day: 0.50    Average packs/day: 0.5 packs/day for 34.6 years (17.3 ttl pk-yrs)    Types: Cigarettes    Start date: 1990    Passive exposure: Current   Smokeless Tobacco Never     Alcohol Consumption:  Social History     Substance and Sexual Activity   Alcohol Use Not Currently       Fall Risk Screen{Jump to Steadi Fall Risk Flowsheet:23}  STEADI Fall Risk Assessment was completed, and patient is at LOW risk for " falls.Assessment completed on:2024    Depression Screenin/21/2024     2:57 PM   PHQ-2/PHQ-9 Depression Screening   Little Interest or Pleasure in Doing Things 0-->not at all   Feeling Down, Depressed or Hopeless 0-->not at all   PHQ-9: Brief Depression Severity Measure Score 0     Health Habits and Functional and Cognitive Screenin/21/2024     3:03 PM   Functional & Cognitive Status   Do you have difficulty preparing food and eating? No   Do you have difficulty bathing yourself, getting dressed or grooming yourself? No   Do you have difficulty using the toilet? No   Do you have difficulty moving around from place to place? No   Do you have trouble with steps or getting out of a bed or a chair? No   Current Diet Well Balanced Diet   Dental Exam Up to date   Eye Exam Up to date   Exercise (times per week) 2 times per week   Current Exercises Include Gardening;House Cleaning;Swimming   Do you need help using the phone?  No   Are you deaf or do you have serious difficulty hearing?  No   Do you need help to go to places out of walking distance? No   Do you need help shopping? No   Do you need help preparing meals?  No   Do you need help with housework?  No   Do you need help with laundry? No   Do you need help taking your medications? No   Do you need help managing money? No   Do you ever drive or ride in a car without wearing a seat belt? No   Have you felt unusual stress, anger or loneliness in the last month? No   Who do you live with? Alone   If you need help, do you have trouble finding someone available to you? No   Have you been bothered in the last four weeks by sexual problems? No   Do you have difficulty concentrating, remembering or making decisions? No           Age-appropriate Screening Schedule:  Refer to the list below for future screening recommendations based on patient's age, sex and/or medical conditions. Orders for these recommended tests are listed in the plan section. The  patient has been provided with a written plan.    Health Maintenance List  Health Maintenance   Topic Date Due    MAMMOGRAM  Never done    PAP SMEAR  Never done    COVID-19 Vaccine (6 - 2023-24 season) 09/01/2023    INFLUENZA VACCINE  08/01/2024    LIPID PANEL  07/01/2025    ANNUAL WELLNESS VISIT  08/21/2025    TDAP/TD VACCINES (4 - Td or Tdap) 03/24/2032    COLORECTAL CANCER SCREENING  05/09/2034    HEPATITIS C SCREENING  Completed    Pneumococcal Vaccine 0-64  Completed                                                                                                                                                CMS Preventative Services Quick Reference  Risk Factors Identified During Encounter  {Medicare Wellness Risk Factors:48481}    The above risks/problems have been discussed with the patient.  Pertinent information has been shared with the patient in the After Visit Summary.  An After Visit Summary and PPPS were made available to the patient.    Follow Up:{Wrapup  Review (Popup)  Advance Care Planning  Labs  CC  Problem List  Visit Diagnosis  Medications  Result Review  Imaging  [facility]  [facility]  [employer]  Encounters  Notes  Media  Procedures :23}   Next Medicare Wellness visit to be scheduled in 1 year.     Assessment & Plan               Follow Up:{Wrapup  Review (Popup)  Advance Care Planning  Labs  CC  Problem List  Visit Diagnosis  Medications  Result Review  Imaging  [facility]  [facility]  [facility]  [employer]  Encounters  Notes  Media  Procedures :23}   No follow-ups on file.

## 2024-08-21 NOTE — PROGRESS NOTES
Subjective   The ABCs of the Annual Wellness Visit  Medicare Wellness Visit      Maday Fung is a 48 y.o. patient who presents for a Medicare Wellness Visit.    The following portions of the patient's history were reviewed and   updated as appropriate: allergies, current medications, past family history, past medical history, past social history, past surgical history, and problem list.    Compared to one year ago, the patient's physical   health is the same.  Compared to one year ago, the patient's mental   health is the same.    Recent Hospitalizations:  She was not admitted to the hospital during the last year.     Current Medical Providers:  Patient Care Team:  Shanique Saeed APRN as PCP - General (Family Medicine)  Margret Wright APRN as Nurse Practitioner (Nurse Practitioner)    Outpatient Medications Prior to Visit   Medication Sig Dispense Refill    Advair -21 MCG/ACT inhaler INHALE TWO PUFFS BY MOUTH TWICE A DAY 12 g 2    albuterol (ACCUNEB) 1.25 MG/3ML nebulizer solution Inhale 3 mL.      albuterol sulfate  (90 Base) MCG/ACT inhaler Inhale 2 puffs.      chlorhexidine (HIBICLENS) 4 % external liquid Apply  topically to the appropriate area as directed Daily As Needed for Wound Care. 3790 mL 1    cholecalciferol (VITAMIN D3) 10 MCG (400 UNIT) tablet Take 1 tablet by mouth.      cilostazol (PLETAL) 100 MG tablet Take 1 tablet by mouth 2 (Two) Times a Day. 60 tablet 5    cloNIDine (CATAPRES) 0.1 MG tablet TAKE ONE TABLET BY MOUTH TWICE A DAY AS NEEDED 90 tablet 2    FLUoxetine (PROzac) 20 MG capsule TAKE TWO CAPSULES BY MOUTH EVERY DAY ( NEED APPOINTMENT FOR FURTHER REFILLS ) 60 capsule 0    folic acid (FOLVITE) 400 MCG tablet Take 1 tablet by mouth Daily.      folic acid-vit B6-vit B12 (FOLGARD) 2.2-25-1 MG tablet tablet Take  by mouth Daily.      furosemide (LASIX) 20 MG tablet TAKE ONE TABLET BY MOUTH EVERY DAY 90 tablet 1    ipratropium-albuterol (DUO-NEB) 0.5-2.5 mg/3 ml  nebulizer Take 3 mL by nebulization Every 4 (Four) Hours As Needed for Wheezing. 360 mL 5    lactulose (CHRONULAC) 10 GM/15ML solution Take 15 mL by mouth.      oxyCODONE-acetaminophen (PERCOCET)  MG per tablet Take 1 tablet by mouth Every 6 (Six) Hours As Needed for Moderate Pain.      potassium chloride (K-DUR,KLOR-CON) 20 MEQ CR tablet TAKE ONE TABLET BY MOUTH TWICE A DAY 60 tablet 0    potassium chloride 10 MEQ CR tablet Take 1 tablet by mouth 2 (Two) Times a Day.      rosuvastatin (CRESTOR) 10 MG tablet TAKE ONE TABLET BY MOUTH EVERY DAY ( NEED APPOINTMENT FOR FURTHER REFILLS ) 30 tablet 0    sennosides-docusate (PERICOLACE) 8.6-50 MG per tablet Take 1 tablet by mouth.      doxepin (SINEquan) 25 MG capsule TAKE ONE CAPSULE BY MOUTH EVERY EVENING 90 capsule 1    doxycycline (VIBRAMYCIN) 100 MG capsule Take 1 capsule by mouth 2 (Two) Times a Day. 20 capsule 0    mupirocin (BACTROBAN) 2 % cream Apply 1 application  topically to the appropriate area as directed 3 (Three) Times a Day. 30 g 1    benzonatate (Tessalon Perles) 100 MG capsule Take 1 capsule by mouth 3 (Three) Times a Day As Needed for Cough. (Patient not taking: Reported on 8/21/2024) 40 capsule 2     Facility-Administered Medications Prior to Visit   Medication Dose Route Frequency Provider Last Rate Last Admin    cyanocobalamin injection 1,000 mcg  1,000 mcg Intramuscular Q28 Days Hadley Meza MD   1,000 mcg at 12/15/23 0906     Opioid medication/s are on active medication list.  and I have evaluated her active treatment plan and pain score trends (see table).  There were no vitals filed for this visit.  I have reviewed the chart for potential of high risk medication and harmful drug interactions in the elderly.        Aspirin is not on active medication list.  Aspirin use is not indicated based on review of current medical condition/s. Risk of harm outweighs potential benefits.  .    Patient Active Problem List   Diagnosis    Chronic systolic  "CHF (congestive heart failure)    Vitamin D deficiency    Insomnia, unspecified    Generalized anxiety disorder    Chronic left-sided back pain    Tobacco dependence    Degeneration of cervical intervertebral disc    Prediabetes    History of cervical dysplasia    COPD with acute exacerbation    Encounter for screening for malignant neoplasm of colon    MRSA (methicillin resistant Staphylococcus aureus)    Bipolar affective disorder, currently depressed, moderate    BMI 24.0-24.9, adult     Advance Care Planning Advance Directive is not on file.  ACP discussion was declined by the patient. Patient does not have an advance directive, declines further assistance.            Objective   Vitals:    08/21/24 1502 08/21/24 1503   BP: 143/85 140/80   Pulse: 110    Resp: 19    Temp: 97.7 °F (36.5 °C)    TempSrc: Skin    SpO2: 95%    Weight: 58.4 kg (128 lb 12.8 oz)    Height: 154.9 cm (60.98\")        Estimated body mass index is 24.35 kg/m² as calculated from the following:    Height as of this encounter: 154.9 cm (60.98\").    Weight as of this encounter: 58.4 kg (128 lb 12.8 oz).    BMI is within normal parameters. No other follow-up for BMI required.       Does the patient have evidence of cognitive impairment? No  Lab Results   Component Value Date    CHLPL 138 07/01/2024    TRIG 100 07/01/2024    HDL 60 07/01/2024    LDL 60 07/01/2024    VLDL 18 07/01/2024    HGBA1C 6.1 (H) 07/01/2024                                                                                                Health  Risk Assessment    Smoking Status:  Social History     Tobacco Use   Smoking Status Every Day    Current packs/day: 0.50    Average packs/day: 0.5 packs/day for 34.6 years (17.3 ttl pk-yrs)    Types: Cigarettes    Start date: 1990    Passive exposure: Current   Smokeless Tobacco Never     Alcohol Consumption:  Social History     Substance and Sexual Activity   Alcohol Use Not Currently       Fall Risk Screen  STEADI Fall Risk Assessment " was completed, and patient is at LOW risk for falls.Assessment completed on:2024    Depression Screenin/21/2024     2:57 PM   PHQ-2/PHQ-9 Depression Screening   Little Interest or Pleasure in Doing Things 0-->not at all   Feeling Down, Depressed or Hopeless 0-->not at all   PHQ-9: Brief Depression Severity Measure Score 0     Health Habits and Functional and Cognitive Screenin/21/2024     3:03 PM   Functional & Cognitive Status   Do you have difficulty preparing food and eating? No   Do you have difficulty bathing yourself, getting dressed or grooming yourself? No   Do you have difficulty using the toilet? No   Do you have difficulty moving around from place to place? No   Do you have trouble with steps or getting out of a bed or a chair? No   Current Diet Well Balanced Diet   Dental Exam Up to date   Eye Exam Up to date   Exercise (times per week) 2 times per week   Current Exercises Include Gardening;House Cleaning;Swimming   Do you need help using the phone?  No   Are you deaf or do you have serious difficulty hearing?  No   Do you need help to go to places out of walking distance? No   Do you need help shopping? No   Do you need help preparing meals?  No   Do you need help with housework?  No   Do you need help with laundry? No   Do you need help taking your medications? No   Do you need help managing money? No   Do you ever drive or ride in a car without wearing a seat belt? No   Have you felt unusual stress, anger or loneliness in the last month? No   Who do you live with? Alone   If you need help, do you have trouble finding someone available to you? No   Have you been bothered in the last four weeks by sexual problems? No   Do you have difficulty concentrating, remembering or making decisions? No           Age-appropriate Screening Schedule:  Refer to the list below for future screening recommendations based on patient's age, sex and/or medical conditions. Orders for these recommended  tests are listed in the plan section. The patient has been provided with a written plan.    Health Maintenance List  Health Maintenance   Topic Date Due    MAMMOGRAM  Never done    PAP SMEAR  Never done    COVID-19 Vaccine (6 - 2023-24 season) 09/01/2023    INFLUENZA VACCINE  08/01/2024    LIPID PANEL  07/01/2025    ANNUAL WELLNESS VISIT  08/21/2025    TDAP/TD VACCINES (4 - Td or Tdap) 03/24/2032    COLORECTAL CANCER SCREENING  05/09/2034    HEPATITIS C SCREENING  Completed    Pneumococcal Vaccine 0-64  Completed                                                                                                                                                CMS Preventative Services Quick Reference  Risk Factors Identified During Encounter  Immunizations Discussed/Encouraged: COVID19    The above risks/problems have been discussed with the patient.  Pertinent information has been shared with the patient in the After Visit Summary.  An After Visit Summary and PPPS were made available to the patient.    Follow Up:   Next Medicare Wellness visit to be scheduled in 1 year.         Additional E&M Note during same encounter follows:  Patient has additional, significant, and separately identifiable condition(s)/problem(s) that require work above and beyond the Medicare Wellness Visit     Chief Complaint  Medicare Wellness-subsequent, Fatigue (Pt has been feeling weak more often then normal), and Abscess (Pt was diagnosed with MERSA and is having a flare up on her left butt cheek)    Subjective   HPI  Maday is also being seen today for additional medical problem/s.  Patient complains of an abscess on her buttock that she feels is likely another MRSA area. She has been keeping it clean but nothing applied topically. She has been washing it with Hibiclens. It has been present for about 2 weeks. No other skin breakdown or suspicious areas for infection noted by patient.                Objective   Vital Signs:  /80   Pulse  "110   Temp 97.7 °F (36.5 °C) (Skin)   Resp 19   Ht 154.9 cm (60.98\")   Wt 58.4 kg (128 lb 12.8 oz)   SpO2 95%   BMI 24.35 kg/m²   Physical Exam  Vitals and nursing note reviewed.   Constitutional:       General: She is not in acute distress.     Appearance: Normal appearance. She is normal weight. She is not ill-appearing.   HENT:      Head: Normocephalic and atraumatic.   Neck:      Vascular: No carotid bruit.   Cardiovascular:      Rate and Rhythm: Regular rhythm. Tachycardia present.      Pulses: Normal pulses.      Heart sounds: Normal heart sounds. No murmur heard.  Pulmonary:      Effort: Pulmonary effort is normal.      Breath sounds: Normal breath sounds.   Abdominal:      General: Bowel sounds are normal.      Palpations: Abdomen is soft.   Musculoskeletal:      Cervical back: Neck supple.      Right lower leg: No edema.      Left lower leg: No edema.   Skin:     General: Skin is warm and dry.      Capillary Refill: Capillary refill takes less than 2 seconds.      Findings: Erythema present.      Comments: Left buttock has an oblong 2 cm x 5.5 cm open area with no drainage and only minimal erythema. There is no induration or tract identified. It is tender.   Neurological:      Mental Status: She is alert and oriented to person, place, and time.                 Assessment and Plan               Medicare annual wellness visit, subsequent    MRSA (methicillin resistant Staphylococcus aureus)    Bipolar affective disorder, currently depressed, moderate    Primary insomnia    Breast cancer screening by mammogram    Fatigue, unspecified type    B12 deficiency    BMI 24.0-24.9, adult      Orders Placed This Encounter   Procedures    Mammo Screening Digital Tomosynthesis Bilateral With CAD     Standing Status:   Future     Standing Expiration Date:   8/21/2025     Order Specific Question:   Reason for Exam:     Answer:   screening     Order Specific Question:   Release to patient     Answer:   Routine Release " [[id number]]     Order Specific Question:   Patient Pregnant     Answer:   No     Order Specific Question:   Does this patient have a diabetic monitoring/medication delivering device on?     Answer:   No     New Medications Ordered This Visit   Medications    doxycycline (VIBRAMYCIN) 100 MG capsule     Sig: Take 1 capsule by mouth 2 (Two) Times a Day.     Dispense:  20 capsule     Refill:  0    Cariprazine HCl (Vraylar) 1.5 MG capsule capsule     Sig: Take 1 capsule by mouth Daily.     Dispense:  28 capsule     Refill:  0    mupirocin (BACTROBAN) 2 % ointment     Sig: Apply 1 Application topically to the appropriate area as directed 3 (Three) Times a Day.     Dispense:  30 g     Refill:  2    doxepin (SINEquan) 100 MG capsule     Sig: Take 1 capsule by mouth Every Evening.     Dispense:  30 capsule     Refill:  2    cyanocobalamin injection 1,000 mcg     Patient encouraged to partake of healthy diet, low carb, rich in fresh fruits and vegetables as well as lean proteins.  Patient encouraged to participate in daily exercise with goal of 30 min sustained activity.       Follow Up   Return in about 6 months (around 2/21/2025) for diabetes follow up with labs prior (with Shanique).  Patient was given instructions and counseling regarding her condition or for health maintenance advice. Please see specific information pulled into the AVS if appropriate.

## 2024-08-28 ENCOUNTER — OFFICE VISIT (OUTPATIENT)
Dept: FAMILY MEDICINE CLINIC | Facility: CLINIC | Age: 49
End: 2024-08-28
Payer: MEDICARE

## 2024-08-28 VITALS
WEIGHT: 127 LBS | SYSTOLIC BLOOD PRESSURE: 135 MMHG | TEMPERATURE: 98.1 F | OXYGEN SATURATION: 95 % | DIASTOLIC BLOOD PRESSURE: 83 MMHG | HEIGHT: 61 IN | BODY MASS INDEX: 23.98 KG/M2 | HEART RATE: 108 BPM

## 2024-08-28 DIAGNOSIS — L23.9 ALLERGIC CONTACT DERMATITIS, UNSPECIFIED TRIGGER: Primary | ICD-10-CM

## 2024-08-28 DIAGNOSIS — F41.9 ANXIETY: ICD-10-CM

## 2024-08-28 DIAGNOSIS — R53.83 OTHER FATIGUE: ICD-10-CM

## 2024-08-28 PROCEDURE — 1160F RVW MEDS BY RX/DR IN RCRD: CPT | Performed by: NURSE PRACTITIONER

## 2024-08-28 PROCEDURE — 1125F AMNT PAIN NOTED PAIN PRSNT: CPT | Performed by: NURSE PRACTITIONER

## 2024-08-28 PROCEDURE — 99213 OFFICE O/P EST LOW 20 MIN: CPT | Performed by: NURSE PRACTITIONER

## 2024-08-28 PROCEDURE — 96372 THER/PROPH/DIAG INJ SC/IM: CPT | Performed by: NURSE PRACTITIONER

## 2024-08-28 PROCEDURE — 1159F MED LIST DOCD IN RCRD: CPT | Performed by: NURSE PRACTITIONER

## 2024-08-28 RX ORDER — TRIAMCINOLONE ACETONIDE 40 MG/ML
40 INJECTION, SUSPENSION INTRA-ARTICULAR; INTRAMUSCULAR ONCE
Status: COMPLETED | OUTPATIENT
Start: 2024-08-28 | End: 2024-08-28

## 2024-08-28 RX ORDER — CYANOCOBALAMIN 1000 UG/ML
1000 INJECTION, SOLUTION INTRAMUSCULAR; SUBCUTANEOUS
Status: SHIPPED | OUTPATIENT
Start: 2024-08-28

## 2024-08-28 RX ADMIN — CYANOCOBALAMIN 1000 MCG: 1000 INJECTION, SOLUTION INTRAMUSCULAR; SUBCUTANEOUS at 16:10

## 2024-08-28 RX ADMIN — TRIAMCINOLONE ACETONIDE 40 MG: 40 INJECTION, SUSPENSION INTRA-ARTICULAR; INTRAMUSCULAR at 16:10

## 2024-08-28 NOTE — PROGRESS NOTES
"Chief Complaint   Patient presents with    Anxiety        Subjective   Maday Fung is a 48 y.o. female who presents today for the following   Rash- Patient states that 3 days ago a rash started on her hands. She has tried hibaclens, alcohol without any improvement. It was itchy but now it just hurts. She denies rash anywhere else. No one else in home has rash. She has not changed any soaps, detergent.   Anxiety- Patient states she is having anxiety. She is dealing with a death in family and having two children live with her that doesn't normally live with her. Patient states she was on xanax in the past and it helped. She has tried Buspar, atarax, clonazepam, vraylar, and other antidepressants without any improvement. Patient denies any suicidal or homicidal thoughts.           Allergies   Allergen Reactions    Hydrocodone-Acetaminophen Hives     She breaks out in hives, rash and her throat feels funny     Cefaclor Swelling    Penicillins Hives    Sulfa Antibiotics Other (See Comments)         OBJECTIVE:  Vitals:    08/28/24 1506   BP: 135/83   BP Location: Left arm   Patient Position: Sitting   Pulse: 108   Temp: 98.1 °F (36.7 °C)   TempSrc: Infrared   SpO2: 95%   Weight: 57.6 kg (127 lb)   Height: 154.9 cm (60.98\")     Physical Exam  Vitals reviewed.   Constitutional:       Appearance: Normal appearance.   HENT:      Head: Normocephalic and atraumatic.      Nose: Nose normal.   Eyes:      Pupils: Pupils are equal, round, and reactive to light.   Cardiovascular:      Rate and Rhythm: Normal rate and regular rhythm.   Pulmonary:      Breath sounds: Normal breath sounds. No wheezing or rhonchi.   Abdominal:      General: Bowel sounds are normal.   Musculoskeletal:         General: Normal range of motion.   Skin:     Comments: Erythematous papular rash on all over bilateral hands. No drainage noted. No rash noted anywhere else on body.    Neurological:      Mental Status: She is alert and oriented to person, " place, and time.   Psychiatric:         Mood and Affect: Mood is anxious.         Behavior: Behavior normal.         Thought Content: Thought content normal.         Judgment: Judgment normal.         BMI is within normal parameters. No other follow-up for BMI required.          ASSESSMENT/ PLAN:    Diagnoses and all orders for this visit:    1. Allergic contact dermatitis, unspecified trigger (Primary)  -     triamcinolone acetonide (KENALOG-40) injection 40 mg    2. Other fatigue  -     cyanocobalamin injection 1,000 mcg    3. Anxiety  Comments:  Discussed a referral to mental health. Patient declined.      Procedures       Follow-up: Return if symptoms worsen or fail to improve.      Loida Aguilear, APRN 8/29/2024 07:55 CDT  This note was electronically signed.

## 2024-08-29 PROBLEM — F41.9 ANXIETY: Status: ACTIVE | Noted: 2024-08-29

## 2024-09-05 DIAGNOSIS — E78.2 MIXED HYPERLIPIDEMIA: ICD-10-CM

## 2024-09-05 DIAGNOSIS — F41.1 GAD (GENERALIZED ANXIETY DISORDER): ICD-10-CM

## 2024-09-05 RX ORDER — ROSUVASTATIN CALCIUM 10 MG/1
10 TABLET, COATED ORAL DAILY
Qty: 90 TABLET | Refills: 1 | Status: SHIPPED | OUTPATIENT
Start: 2024-09-05

## 2024-09-05 NOTE — TELEPHONE ENCOUNTER
Rx Refill Note  Requested Prescriptions     Pending Prescriptions Disp Refills    rosuvastatin (CRESTOR) 10 MG tablet [Pharmacy Med Name: ROSUVASTATIN CALCIUM 10MG TABS] 30 tablet 0     Sig: TAKE ONE TABLET BY MOUTH EVERY DAY    FLUoxetine (PROzac) 20 MG capsule [Pharmacy Med Name: FLUOXETINE HCL 20MG CAPS] 60 capsule 0     Sig: TAKE TWO CAPSULES BY MOUTH EVERY DAY          Leslie Long MA  09/05/24, 08:49 CDT

## 2024-10-01 NOTE — TELEPHONE ENCOUNTER
Rx Refill Note  Requested Prescriptions     Pending Prescriptions Disp Refills    Advair -21 MCG/ACT inhaler [Pharmacy Med Name: ADVAIR -21MCG/ACT AERO] 12 g 2     Sig: INHALE TWO PUFFS BY MOUTH TWICE A DAY        Jesica Mccloud MA  10/01/24, 14:53 CDT

## 2024-10-02 ENCOUNTER — TELEPHONE (OUTPATIENT)
Dept: FAMILY MEDICINE CLINIC | Facility: CLINIC | Age: 49
End: 2024-10-02

## 2024-10-02 RX ORDER — FLUTICASONE PROPIONATE AND SALMETEROL XINAFOATE 115; 21 UG/1; UG/1
AEROSOL, METERED RESPIRATORY (INHALATION)
Qty: 12 G | Refills: 2 | Status: SHIPPED | OUTPATIENT
Start: 2024-10-02

## 2024-10-02 NOTE — TELEPHONE ENCOUNTER
Pt walked in to the  c/o potential worsening of CHF and chest tightness/pain.  did offer appt for pt at 2:45 pm today. She asked if an EKG would tell her if she was having a heart attack or if her CHF was worsening. After confirming with DEEPTHI Landaverde - to which she suggested ER for pt to check cardiac markers and fluid load - advised pt to seek emergency treatment - EMS or to go directly to ER. Pt was resistant at first bc she states that they are too far away and cost too much. Pt again advised ER treatment for life safety. She agreed to seek emergency help.   Pt did not appear to be in distress, no labored breathing, palor or any other symptom.   Appt was kept on schedule in case pt shows up and did not seek emergency treatment.   We will follow up with pt for status update.

## 2024-10-22 ENCOUNTER — TELEPHONE (OUTPATIENT)
Dept: FAMILY MEDICINE CLINIC | Facility: CLINIC | Age: 49
End: 2024-10-22

## 2024-10-22 NOTE — TELEPHONE ENCOUNTER
Pt called in to the hub to cancel her appt today that was at 3pm. She stated that her BP top number was 95. Pt stated that she was in heart failure and having a low BP was a common thing. Jackelin from the hub states that she sounds very weak and noticed that she has been cancelling several appt the last few months.

## 2024-10-24 ENCOUNTER — TRANSCRIBE ORDERS (OUTPATIENT)
Dept: ADMINISTRATIVE | Facility: HOSPITAL | Age: 49
End: 2024-10-24
Payer: MEDICARE

## 2024-10-24 DIAGNOSIS — M51.16 INTERVERTEBRAL DISC DISORDER WITH RADICULOPATHY OF LUMBAR REGION: Primary | ICD-10-CM

## 2024-10-25 ENCOUNTER — TELEPHONE (OUTPATIENT)
Dept: FAMILY MEDICINE CLINIC | Facility: CLINIC | Age: 49
End: 2024-10-25
Payer: MEDICARE

## 2024-10-25 NOTE — TELEPHONE ENCOUNTER
Called pt to follow up with 10/2/2024 recommendation for pt to report to ER for CHF /potential fluid overload. No answer. Left vm to call back

## 2024-10-25 NOTE — TELEPHONE ENCOUNTER
Called pt for follow up on missed appointments and CHF . No answer. Called [facility] ER to check and see if pt was seen on 10/2/2024 as advised. Spoke to HIM and they state that pt has not been seen at [facility].   Skyla rosado sent to pt to ask her to f/u  at our office for general health check up.

## 2024-11-03 DIAGNOSIS — F51.01 PRIMARY INSOMNIA: ICD-10-CM

## 2024-11-04 RX ORDER — DOXEPIN HYDROCHLORIDE 100 MG/1
100 CAPSULE ORAL EVERY EVENING
Qty: 30 CAPSULE | Refills: 2 | Status: SHIPPED | OUTPATIENT
Start: 2024-11-04

## 2024-11-04 NOTE — TELEPHONE ENCOUNTER
Rx Refill Note  Requested Prescriptions     Pending Prescriptions Disp Refills    doxepin (SINEquan) 100 MG capsule [Pharmacy Med Name: DOXEPIN HCL 100MG CAPS] 30 capsule 2     Sig: TAKE ONE CAPSULE BY MOUTH EVERY EVENING        Jesica Mccloud MA  11/04/24, 12:06 CST

## 2024-11-20 ENCOUNTER — HOSPITAL ENCOUNTER (OUTPATIENT)
Dept: VASCULAR LAB | Age: 49
Discharge: HOME | End: 2024-11-22
Payer: MEDICARE

## 2024-11-20 ENCOUNTER — OFFICE VISIT (OUTPATIENT)
Dept: VASCULAR SURGERY | Age: 49
End: 2024-11-20
Payer: MEDICARE

## 2024-11-20 ENCOUNTER — HOSPITAL ENCOUNTER (OUTPATIENT)
Dept: NON INVASIVE DIAGNOSTICS | Age: 49
Discharge: HOME | End: 2024-11-22
Payer: MEDICARE

## 2024-11-20 VITALS — SYSTOLIC BLOOD PRESSURE: 142 MMHG | HEART RATE: 96 BPM | OXYGEN SATURATION: 94 % | DIASTOLIC BLOOD PRESSURE: 82 MMHG

## 2024-11-20 DIAGNOSIS — R09.89 RIGHT CAROTID BRUIT: Primary | ICD-10-CM

## 2024-11-20 DIAGNOSIS — R09.89 RIGHT CAROTID BRUIT: ICD-10-CM

## 2024-11-20 DIAGNOSIS — I70.213 ATHEROSCLER OF NATIVE ARTERY OF BOTH LEGS WITH INTERMIT CLAUDICATION (HCC): ICD-10-CM

## 2024-11-20 DIAGNOSIS — I65.23 BILATERAL CAROTID ARTERY STENOSIS: ICD-10-CM

## 2024-11-20 PROCEDURE — 93923 UPR/LXTR ART STDY 3+ LVLS: CPT

## 2024-11-20 PROCEDURE — 99214 OFFICE O/P EST MOD 30 MIN: CPT | Performed by: NURSE PRACTITIONER

## 2024-11-20 PROCEDURE — 93880 EXTRACRANIAL BILAT STUDY: CPT

## 2024-11-21 ENCOUNTER — TELEPHONE (OUTPATIENT)
Dept: VASCULAR SURGERY | Age: 49
End: 2024-11-21

## 2024-11-21 NOTE — PROGRESS NOTES
extremity arterial study: Right ROVERTO 1.01, Left ROVERTO 0.88.  Individual films reviewed: Yes.  These results were reviewed with the patient.  Disease process is stable, chronic illness  by review of waveforms, I see no significant change          Reviewed previous studies including: jefry  Individual images were reviewed.  I agree with the findings  Results were discussed with the patient.      ASSESSMENT/PLAN:  1. Right carotid bruit  -     Vascular duplex carotid bilateral; Future  2. 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  Results were reviewed with the patient.  Disease process is undiagnosed new problem with uncertain prognosis     Will get cta head and neck.  Will see in the office to discuss recommendations after ct        An electronic signature was used to authenticate this note.    --MELIDA Vaughn

## 2024-11-21 NOTE — TELEPHONE ENCOUNTER
Returned the phone call to the patient to let her know we have her scheduled for her CTA of the head and neck on Tuesday, with a follow up appointment with Dyana the same day to discuss options.  The patient stated she was driving and asked me to text her the information.  I let her know the information will be in her MYCHart.  She voiced understanding.

## 2024-11-21 NOTE — TELEPHONE ENCOUNTER
Called and left a message for the patient to give me a call to schedule another study.  I have two dates and times that she can come in.  I am going to speak with her to see which one she would want to do.            ----- Message from MELIDA Vaughn sent at 11/21/2024  7:53 AM CST -----  Please call patient.  Let her know carotid on right is critical.  She will need cta head and neck.  Please schedule for next week and I will see her in the office to follow

## 2024-11-22 LAB
VAS LEFT ABI: 0.88
VAS LEFT ARM BP DIA: 80 MMHG
VAS LEFT ARM BP: 144 MMHG
VAS LEFT ARM BP: 160 MMHG
VAS LEFT CALF PRESSURE: 102 MMHG
VAS LEFT CCA MID EDV: 42.3 CM/S
VAS LEFT CCA MID PSV: 132 CM/S
VAS LEFT CCA PROX EDV: 30.8 CM/S
VAS LEFT CCA PROX PSV: 104 CM/S
VAS LEFT DORSALIS PEDIS BP: 87 MMHG
VAS LEFT ECA EDV: 19.6 CM/S
VAS LEFT ECA PSV: 106 CM/S
VAS LEFT HIGH THIGH PRESSURE: 121 MMHG
VAS LEFT ICA DIST EDV: 22 CM/S
VAS LEFT ICA DIST PSV: 67.2 CM/S
VAS LEFT ICA MID EDV: 63.1 CM/S
VAS LEFT ICA MID PSV: 180 CM/S
VAS LEFT ICA PROX EDV: 68.6 CM/S
VAS LEFT ICA PROX PSV: 184 CM/S
VAS LEFT LOW THIGH PRESSURE: 118 MMHG
VAS LEFT PTA BP: 126 MMHG
VAS LEFT TBI: 0.58
VAS LEFT TOE PRESSURE: 83 MMHG
VAS LEFT VERTEBRAL EDV: 17.5 CM/S
VAS LEFT VERTEBRAL PSV: 46.9 CM/S
VAS RIGHT ABI: 1.01
VAS RIGHT ARM BP DIA: 60 MMHG
VAS RIGHT ARM BP: 132 MMHG
VAS RIGHT ARM BP: 162 MMHG
VAS RIGHT CALF PRESSURE: 144 MMHG
VAS RIGHT CCA MID EDV: 25.5 CM/S
VAS RIGHT CCA MID PSV: 91.9 CM/S
VAS RIGHT CCA PROX EDV: 19.9 CM/S
VAS RIGHT CCA PROX PSV: 83.9 CM/S
VAS RIGHT DORSALIS PEDIS BP: 145 MMHG
VAS RIGHT ECA EDV: 20.9 CM/S
VAS RIGHT ECA PSV: 185 CM/S
VAS RIGHT HIGH THIGH PRESSURE: 150 MMHG
VAS RIGHT ICA DIST EDV: 25.2 CM/S
VAS RIGHT ICA DIST PSV: 73.6 CM/S
VAS RIGHT ICA MID EDV: 74.1 CM/S
VAS RIGHT ICA MID PSV: 161 CM/S
VAS RIGHT ICA PROX EDV: 182 CM/S
VAS RIGHT ICA PROX PSV: 514 CM/S
VAS RIGHT LOW THIGH PRESSURE: 150 MMHG
VAS RIGHT PTA BP: 145 MMHG
VAS RIGHT TBI: 0.61
VAS RIGHT TOE PRESSURE: 88 MMHG
VAS RIGHT VERTEBRAL EDV: 15.4 CM/S
VAS RIGHT VERTEBRAL PSV: 46.2 CM/S

## 2024-11-26 ENCOUNTER — HOSPITAL ENCOUNTER (OUTPATIENT)
Dept: CT IMAGING | Age: 49
Discharge: HOME | End: 2024-11-26
Payer: MEDICARE

## 2024-11-26 ENCOUNTER — APPOINTMENT (OUTPATIENT)
Dept: MRI IMAGING | Age: 49
DRG: 066 | End: 2024-11-26
Payer: COMMERCIAL

## 2024-11-26 ENCOUNTER — OFFICE VISIT (OUTPATIENT)
Dept: VASCULAR SURGERY | Age: 49
End: 2024-11-26
Payer: MEDICARE

## 2024-11-26 ENCOUNTER — HOSPITAL ENCOUNTER (INPATIENT)
Age: 49
LOS: 1 days | Discharge: HOME | DRG: 066 | End: 2024-11-27
Attending: EMERGENCY MEDICINE | Admitting: INTERNAL MEDICINE
Payer: COMMERCIAL

## 2024-11-26 VITALS — HEART RATE: 97 BPM | SYSTOLIC BLOOD PRESSURE: 202 MMHG | OXYGEN SATURATION: 98 % | DIASTOLIC BLOOD PRESSURE: 105 MMHG

## 2024-11-26 DIAGNOSIS — I65.23 BILATERAL CAROTID ARTERY STENOSIS: ICD-10-CM

## 2024-11-26 DIAGNOSIS — I63.9 CEREBROVASCULAR ACCIDENT (CVA), UNSPECIFIED MECHANISM (HCC): Primary | ICD-10-CM

## 2024-11-26 DIAGNOSIS — I63.231 CEREBROVASCULAR ACCIDENT (CVA) DUE TO OCCLUSION OF RIGHT CAROTID ARTERY (HCC): ICD-10-CM

## 2024-11-26 DIAGNOSIS — I65.21 RIGHT INTERNAL CAROTID OCCLUSION: ICD-10-CM

## 2024-11-26 DIAGNOSIS — I70.213 ATHEROSCLER OF NATIVE ARTERY OF BOTH LEGS WITH INTERMIT CLAUDICATION (HCC): ICD-10-CM

## 2024-11-26 DIAGNOSIS — I65.23 BILATERAL CAROTID ARTERY STENOSIS: Primary | ICD-10-CM

## 2024-11-26 PROBLEM — J44.9 COPD (CHRONIC OBSTRUCTIVE PULMONARY DISEASE) (HCC): Status: ACTIVE | Noted: 2024-11-26

## 2024-11-26 LAB
ALBUMIN SERPL-MCNC: 5.3 G/DL (ref 3.5–5.2)
ALP SERPL-CCNC: 109 U/L (ref 35–104)
ALT SERPL-CCNC: 13 U/L (ref 5–33)
ANION GAP SERPL CALCULATED.3IONS-SCNC: 13 MMOL/L (ref 7–19)
AST SERPL-CCNC: 20 U/L (ref 5–32)
BACTERIA URNS QL MICRO: NEGATIVE /HPF
BASOPHILS # BLD: 0.1 K/UL (ref 0–0.2)
BASOPHILS NFR BLD: 0.7 % (ref 0–1)
BILIRUB SERPL-MCNC: 0.6 MG/DL (ref 0.2–1.2)
BILIRUB UR QL STRIP: NEGATIVE
BUN SERPL-MCNC: 9 MG/DL (ref 6–20)
CALCIUM SERPL-MCNC: 10 MG/DL (ref 8.6–10)
CHLORIDE SERPL-SCNC: 99 MMOL/L (ref 98–111)
CLARITY UR: CLEAR
CO2 SERPL-SCNC: 29 MMOL/L (ref 22–29)
COLOR UR: YELLOW
CREAT SERPL-MCNC: 0.7 MG/DL (ref 0.5–0.9)
CREAT SERPL-MCNC: 0.8 MG/DL (ref 0.3–1.3)
CRYSTALS URNS MICRO: ABNORMAL /HPF
EKG P AXIS: 56 DEGREES
EKG P-R INTERVAL: 154 MS
EKG Q-T INTERVAL: 420 MS
EKG QRS DURATION: 110 MS
EKG QTC CALCULATION (BAZETT): 456 MS
EKG T AXIS: 95 DEGREES
EOSINOPHIL # BLD: 0.1 K/UL (ref 0–0.6)
EOSINOPHIL NFR BLD: 0.7 % (ref 0–5)
EPI CELLS #/AREA URNS AUTO: 1 /HPF (ref 0–5)
ERYTHROCYTE [DISTWIDTH] IN BLOOD BY AUTOMATED COUNT: 13.3 % (ref 11.5–14.5)
GLUCOSE SERPL-MCNC: 96 MG/DL (ref 70–99)
GLUCOSE UR STRIP.AUTO-MCNC: NEGATIVE MG/DL
HCT VFR BLD AUTO: 49.7 % (ref 37–47)
HGB BLD-MCNC: 16 G/DL (ref 12–16)
HGB UR STRIP.AUTO-MCNC: ABNORMAL MG/L
HYALINE CASTS #/AREA URNS AUTO: 1 /HPF (ref 0–8)
IMM GRANULOCYTES # BLD: 0 K/UL
INR PPP: 1.04 (ref 0.88–1.18)
KETONES UR STRIP.AUTO-MCNC: ABNORMAL MG/DL
LEUKOCYTE ESTERASE UR QL STRIP.AUTO: NEGATIVE
LYMPHOCYTES # BLD: 1.7 K/UL (ref 1.1–4.5)
LYMPHOCYTES NFR BLD: 19.3 % (ref 20–40)
MCH RBC QN AUTO: 32.6 PG (ref 27–31)
MCHC RBC AUTO-ENTMCNC: 32.2 G/DL (ref 33–37)
MCV RBC AUTO: 101.2 FL (ref 81–99)
MONOCYTES # BLD: 0.6 K/UL (ref 0–0.9)
MONOCYTES NFR BLD: 6.3 % (ref 0–10)
NEUTROPHILS # BLD: 6.5 K/UL (ref 1.5–7.5)
NEUTS SEG NFR BLD: 72.8 % (ref 50–65)
NITRITE UR QL STRIP.AUTO: NEGATIVE
PH UR STRIP.AUTO: 7 [PH] (ref 5–8)
PLATELET # BLD AUTO: 405 K/UL (ref 130–400)
PMV BLD AUTO: 9.7 FL (ref 9.4–12.3)
POTASSIUM SERPL-SCNC: 3.3 MMOL/L (ref 3.5–5)
PROT SERPL-MCNC: 9 G/DL (ref 6.4–8.3)
PROT UR STRIP.AUTO-MCNC: NEGATIVE MG/DL
PROTHROMBIN TIME: 13.3 SEC (ref 12–14.6)
RBC # BLD AUTO: 4.91 M/UL (ref 4.2–5.4)
RBC #/AREA URNS AUTO: 5 /HPF (ref 0–4)
SODIUM SERPL-SCNC: 141 MMOL/L (ref 136–145)
SP GR UR STRIP.AUTO: >=1.045 (ref 1–1.03)
UROBILINOGEN UR STRIP.AUTO-MCNC: 1 E.U./DL
WBC # BLD AUTO: 8.9 K/UL (ref 4.8–10.8)
WBC #/AREA URNS AUTO: 2 /HPF (ref 0–5)

## 2024-11-26 PROCEDURE — 94640 AIRWAY INHALATION TREATMENT: CPT

## 2024-11-26 PROCEDURE — 85610 PROTHROMBIN TIME: CPT

## 2024-11-26 PROCEDURE — 6370000000 HC RX 637 (ALT 250 FOR IP)

## 2024-11-26 PROCEDURE — 99223 1ST HOSP IP/OBS HIGH 75: CPT | Performed by: PSYCHIATRY & NEUROLOGY

## 2024-11-26 PROCEDURE — 99214 OFFICE O/P EST MOD 30 MIN: CPT | Performed by: NURSE PRACTITIONER

## 2024-11-26 PROCEDURE — 6360000004 HC RX CONTRAST MEDICATION: Performed by: NURSE PRACTITIONER

## 2024-11-26 PROCEDURE — 93010 ELECTROCARDIOGRAM REPORT: CPT | Performed by: INTERNAL MEDICINE

## 2024-11-26 PROCEDURE — 70551 MRI BRAIN STEM W/O DYE: CPT

## 2024-11-26 PROCEDURE — 82565 ASSAY OF CREATININE: CPT

## 2024-11-26 PROCEDURE — 85025 COMPLETE CBC W/AUTO DIFF WBC: CPT

## 2024-11-26 PROCEDURE — 6370000000 HC RX 637 (ALT 250 FOR IP): Performed by: EMERGENCY MEDICINE

## 2024-11-26 PROCEDURE — 99285 EMERGENCY DEPT VISIT HI MDM: CPT

## 2024-11-26 PROCEDURE — 70498 CT ANGIOGRAPHY NECK: CPT

## 2024-11-26 PROCEDURE — 94760 N-INVAS EAR/PLS OXIMETRY 1: CPT

## 2024-11-26 PROCEDURE — 93005 ELECTROCARDIOGRAM TRACING: CPT | Performed by: EMERGENCY MEDICINE

## 2024-11-26 PROCEDURE — 6360000002 HC RX W HCPCS: Performed by: STUDENT IN AN ORGANIZED HEALTH CARE EDUCATION/TRAINING PROGRAM

## 2024-11-26 PROCEDURE — 6360000002 HC RX W HCPCS

## 2024-11-26 PROCEDURE — 36415 COLL VENOUS BLD VENIPUNCTURE: CPT

## 2024-11-26 PROCEDURE — 1200000000 HC SEMI PRIVATE

## 2024-11-26 PROCEDURE — 80053 COMPREHEN METABOLIC PANEL: CPT

## 2024-11-26 PROCEDURE — 70496 CT ANGIOGRAPHY HEAD: CPT

## 2024-11-26 PROCEDURE — 81001 URINALYSIS AUTO W/SCOPE: CPT

## 2024-11-26 RX ORDER — BUDESONIDE AND FORMOTEROL FUMARATE DIHYDRATE 160; 4.5 UG/1; UG/1
2 AEROSOL RESPIRATORY (INHALATION)
Status: DISCONTINUED | OUTPATIENT
Start: 2024-11-26 | End: 2024-11-27 | Stop reason: HOSPADM

## 2024-11-26 RX ORDER — CYCLOBENZAPRINE HCL 10 MG
TABLET ORAL
COMMUNITY
Start: 2024-11-03

## 2024-11-26 RX ORDER — KETOROLAC TROMETHAMINE 30 MG/ML
15 INJECTION, SOLUTION INTRAMUSCULAR; INTRAVENOUS EVERY 6 HOURS PRN
Status: DISCONTINUED | OUTPATIENT
Start: 2024-11-26 | End: 2024-11-27 | Stop reason: HOSPADM

## 2024-11-26 RX ORDER — SODIUM CHLORIDE 9 MG/ML
INJECTION, SOLUTION INTRAVENOUS PRN
Status: DISCONTINUED | OUTPATIENT
Start: 2024-11-26 | End: 2024-11-27 | Stop reason: HOSPADM

## 2024-11-26 RX ORDER — ASPIRIN 81 MG/1
81 TABLET ORAL DAILY
Status: DISCONTINUED | OUTPATIENT
Start: 2024-11-26 | End: 2024-11-27 | Stop reason: HOSPADM

## 2024-11-26 RX ORDER — NICOTINE 21 MG/24HR
1 PATCH, TRANSDERMAL 24 HOURS TRANSDERMAL DAILY
Status: DISCONTINUED | OUTPATIENT
Start: 2024-11-26 | End: 2024-11-27 | Stop reason: HOSPADM

## 2024-11-26 RX ORDER — POTASSIUM CHLORIDE 1500 MG/1
40 TABLET, EXTENDED RELEASE ORAL PRN
Status: DISCONTINUED | OUTPATIENT
Start: 2024-11-26 | End: 2024-11-27 | Stop reason: HOSPADM

## 2024-11-26 RX ORDER — OXYCODONE AND ACETAMINOPHEN 10; 325 MG/1; MG/1
1 TABLET ORAL ONCE
Status: COMPLETED | OUTPATIENT
Start: 2024-11-26 | End: 2024-11-26

## 2024-11-26 RX ORDER — LABETALOL HYDROCHLORIDE 5 MG/ML
10 INJECTION, SOLUTION INTRAVENOUS EVERY 10 MIN PRN
Status: DISCONTINUED | OUTPATIENT
Start: 2024-11-26 | End: 2024-11-27 | Stop reason: HOSPADM

## 2024-11-26 RX ORDER — IOPAMIDOL 755 MG/ML
60 INJECTION, SOLUTION INTRAVASCULAR
Status: COMPLETED | OUTPATIENT
Start: 2024-11-26 | End: 2024-11-26

## 2024-11-26 RX ORDER — SODIUM CHLORIDE 0.9 % (FLUSH) 0.9 %
5-40 SYRINGE (ML) INJECTION PRN
Status: DISCONTINUED | OUTPATIENT
Start: 2024-11-26 | End: 2024-11-27 | Stop reason: HOSPADM

## 2024-11-26 RX ORDER — POTASSIUM CHLORIDE 7.45 MG/ML
10 INJECTION INTRAVENOUS PRN
Status: DISCONTINUED | OUTPATIENT
Start: 2024-11-26 | End: 2024-11-27 | Stop reason: HOSPADM

## 2024-11-26 RX ORDER — POLYETHYLENE GLYCOL 3350 17 G/17G
17 POWDER, FOR SOLUTION ORAL DAILY PRN
Status: DISCONTINUED | OUTPATIENT
Start: 2024-11-26 | End: 2024-11-27 | Stop reason: HOSPADM

## 2024-11-26 RX ORDER — CILOSTAZOL 100 MG/1
100 TABLET ORAL 2 TIMES DAILY
Status: DISCONTINUED | OUTPATIENT
Start: 2024-11-26 | End: 2024-11-27

## 2024-11-26 RX ORDER — ONDANSETRON 2 MG/ML
4 INJECTION INTRAMUSCULAR; INTRAVENOUS EVERY 6 HOURS PRN
Status: DISCONTINUED | OUTPATIENT
Start: 2024-11-26 | End: 2024-11-27 | Stop reason: HOSPADM

## 2024-11-26 RX ORDER — ENOXAPARIN SODIUM 100 MG/ML
40 INJECTION SUBCUTANEOUS DAILY
Status: DISCONTINUED | OUTPATIENT
Start: 2024-11-26 | End: 2024-11-27 | Stop reason: HOSPADM

## 2024-11-26 RX ORDER — ONDANSETRON 4 MG/1
4 TABLET, ORALLY DISINTEGRATING ORAL EVERY 8 HOURS PRN
Status: DISCONTINUED | OUTPATIENT
Start: 2024-11-26 | End: 2024-11-27 | Stop reason: HOSPADM

## 2024-11-26 RX ORDER — ALBUTEROL SULFATE 90 UG/1
2 INHALANT RESPIRATORY (INHALATION) EVERY 6 HOURS PRN
Status: DISCONTINUED | OUTPATIENT
Start: 2024-11-26 | End: 2024-11-27 | Stop reason: HOSPADM

## 2024-11-26 RX ORDER — ROSUVASTATIN CALCIUM 20 MG/1
40 TABLET, COATED ORAL DAILY
Status: DISCONTINUED | OUTPATIENT
Start: 2024-11-26 | End: 2024-11-27 | Stop reason: HOSPADM

## 2024-11-26 RX ORDER — SODIUM CHLORIDE 0.9 % (FLUSH) 0.9 %
5-40 SYRINGE (ML) INJECTION EVERY 12 HOURS SCHEDULED
Status: DISCONTINUED | OUTPATIENT
Start: 2024-11-26 | End: 2024-11-27 | Stop reason: HOSPADM

## 2024-11-26 RX ORDER — DOXEPIN HYDROCHLORIDE 50 MG/1
100 CAPSULE ORAL NIGHTLY
Status: DISCONTINUED | OUTPATIENT
Start: 2024-11-26 | End: 2024-11-27 | Stop reason: HOSPADM

## 2024-11-26 RX ADMIN — POTASSIUM CHLORIDE 40 MEQ: 1500 TABLET, EXTENDED RELEASE ORAL at 16:12

## 2024-11-26 RX ADMIN — TIZANIDINE 4 MG: 4 TABLET ORAL at 19:24

## 2024-11-26 RX ADMIN — KETOROLAC TROMETHAMINE 15 MG: 30 INJECTION, SOLUTION INTRAMUSCULAR at 21:13

## 2024-11-26 RX ADMIN — CILOSTAZOL 100 MG: 100 TABLET ORAL at 19:24

## 2024-11-26 RX ADMIN — IOPAMIDOL 60 ML: 755 INJECTION, SOLUTION INTRAVENOUS at 10:35

## 2024-11-26 RX ADMIN — OXYCODONE AND ACETAMINOPHEN 1 TABLET: 10; 325 TABLET ORAL at 13:14

## 2024-11-26 RX ADMIN — ASPIRIN 81 MG: 81 TABLET, COATED ORAL at 16:12

## 2024-11-26 RX ADMIN — ENOXAPARIN SODIUM 40 MG: 100 INJECTION SUBCUTANEOUS at 16:12

## 2024-11-26 RX ADMIN — DOXEPIN HYDROCHLORIDE 100 MG: 50 CAPSULE ORAL at 19:24

## 2024-11-26 RX ADMIN — BUDESONIDE AND FORMOTEROL FUMARATE DIHYDRATE 2 PUFF: 160; 4.5 AEROSOL RESPIRATORY (INHALATION) at 19:52

## 2024-11-26 SDOH — ECONOMIC STABILITY: FOOD INSECURITY: WITHIN THE PAST 12 MONTHS, YOU WORRIED THAT YOUR FOOD WOULD RUN OUT BEFORE YOU GOT MONEY TO BUY MORE.: SOMETIMES TRUE

## 2024-11-26 SDOH — ECONOMIC STABILITY: INCOME INSECURITY: HOW HARD IS IT FOR YOU TO PAY FOR THE VERY BASICS LIKE FOOD, HOUSING, MEDICAL CARE, AND HEATING?: SOMEWHAT HARD

## 2024-11-26 SDOH — ECONOMIC STABILITY: INCOME INSECURITY: IN THE PAST 12 MONTHS, HAS THE ELECTRIC, GAS, OIL, OR WATER COMPANY THREATENED TO SHUT OFF SERVICE IN YOUR HOME?: NO

## 2024-11-26 ASSESSMENT — ENCOUNTER SYMPTOMS
RESPIRATORY NEGATIVE: 1
COUGH: 0
BACK PAIN: 1
COLOR CHANGE: 0
EYES NEGATIVE: 1
COUGH: 1
WHEEZING: 0
VOMITING: 0
GASTROINTESTINAL NEGATIVE: 1
SHORTNESS OF BREATH: 0
PHOTOPHOBIA: 0
SHORTNESS OF BREATH: 1
ABDOMINAL PAIN: 0
NAUSEA: 0

## 2024-11-26 ASSESSMENT — PAIN DESCRIPTION - DESCRIPTORS: DESCRIPTORS: ACHING

## 2024-11-26 ASSESSMENT — PAIN DESCRIPTION - LOCATION: LOCATION: LEG;HIP

## 2024-11-26 ASSESSMENT — PATIENT HEALTH QUESTIONNAIRE - PHQ9
SUM OF ALL RESPONSES TO PHQ QUESTIONS 1-9: 0
SUM OF ALL RESPONSES TO PHQ QUESTIONS 1-9: 0
2. FEELING DOWN, DEPRESSED OR HOPELESS: NOT AT ALL
SUM OF ALL RESPONSES TO PHQ QUESTIONS 1-9: 0
SUM OF ALL RESPONSES TO PHQ9 QUESTIONS 1 & 2: 0
1. LITTLE INTEREST OR PLEASURE IN DOING THINGS: NOT AT ALL
SUM OF ALL RESPONSES TO PHQ QUESTIONS 1-9: 0

## 2024-11-26 ASSESSMENT — PAIN SCALES - GENERAL
PAINLEVEL_OUTOF10: 10
PAINLEVEL_OUTOF10: 7

## 2024-11-26 ASSESSMENT — PAIN DESCRIPTION - ORIENTATION: ORIENTATION: RIGHT

## 2024-11-26 NOTE — ED NOTES
Dr. Lala notified of pts BP at this time. No new orders.  
Pt able to ambulate independently to the bathroom.  
Pt ambulatory to the restroom independently for a urine sample.   
Pt back from MRI.  
Pt in MRI.  
Sneha Serrano, Neurology, for Dr. Lala.   
       Abnormal labs:   Abnormal Labs Reviewed   COMPREHENSIVE METABOLIC PANEL - Abnormal; Notable for the following components:       Result Value    Potassium 3.3 (*)     Total Protein 9.0 (*)     Albumin 5.3 (*)     Alkaline Phosphatase 109 (*)     All other components within normal limits   CBC WITH AUTO DIFFERENTIAL - Abnormal; Notable for the following components:    Hematocrit 49.7 (*)     .2 (*)     MCH 32.6 (*)     MCHC 32.2 (*)     Platelets 405 (*)     Neutrophils % 72.8 (*)     Lymphocytes % 19.3 (*)     All other components within normal limits   URINALYSIS - Abnormal; Notable for the following components:    Ketones, Urine TRACE (*)     Blood, Urine TRACE (*)     All other components within normal limits   MICROSCOPIC URINALYSIS - Abnormal; Notable for the following components:    RBC, UA 5 (*)     All other components within normal limits     Background  Allergies:   Allergies   Allergen Reactions    Ceclor [Cefaclor] Swelling    Lortab [Hydrocodone-Acetaminophen]     Penicillins Hives    Sulfa Antibiotics     Nabumetone Nausea And Vomiting     Current Medications:   Medications Administered         oxyCODONE-acetaminophen (PERCOCET)  MG per tablet 1 tablet Admin Date  11/26/2024 Action  Given Dose  1 tablet Route  Oral Documented By  Juanita Roberto, RN            History:   Past Medical History:   Diagnosis Date    Allergic rhinitis     Cancer (HCC)     cervical, lymphoma    Congenital heart disease     Mesenteric artery stenosis (HCC)     MRSA infection     Stroke (HCC)     after heart surgery 2013       Assessment  Vitals:     Vitals:    11/26/24 1315 11/26/24 1400 11/26/24 1402 11/26/24 1430   BP:  (!) 184/98 (!) 175/95 (!) 158/89   Pulse: 81 81  75   Resp: 24 22  20   Temp:    97.9 °F (36.6 °C)   SpO2: 95% 92% 92% 91%     Predictive Model Details          23 (Normal)  Factor Value    Calculated 11/26/2024 14:48 38% Age 49 years old    Deterioration Index Model 25% Respiratory rate 20

## 2024-11-26 NOTE — PROGRESS NOTES
Patient's blood pressure was elevated.  Patient was given a Clonidine 0.1 MG @ 10:51 AM.  Lot # [id number]  EXP 12/2025  
(EFFER-K) effervescent tablet 40 mEq  40 mEq Oral PRN Dionne Ortiz APRN - CNP        Or    potassium chloride 10 mEq/100 mL IVPB (Peripheral Line)  10 mEq IntraVENous PRN Dionne Ortiz APRN - CNP        nicotine (NICODERM CQ) 21 MG/24HR 1 patch  1 patch TransDERmal Daily Dionne Ortiz APRN - CNP   1 patch at 11/26/24 1636    ketorolac (TORADOL) injection 15 mg  15 mg IntraVENous Q6H PRN Red Mercado MD   15 mg at 11/27/24 0425     Allergies: Ceclor [cefaclor], Lortab [hydrocodone-acetaminophen], Penicillins, Sulfa antibiotics, and Nabumetone  Past Medical History:   Diagnosis Date    Allergic rhinitis     Cancer (HCC)     cervical, lymphoma    Congenital heart disease     Mesenteric artery stenosis (HCC)     MRSA infection     Stroke (HCC)     after heart surgery 2013     Past Surgical History:   Procedure Laterality Date    CARDIAC SURGERY      Ross procedure and surgery prior on valve    PARTIAL HYSTERECTOMY (CERVIX NOT REMOVED)       Family History   Problem Relation Age of Onset    Diabetes Mother     Heart Disease Mother     Cancer Father      Social History     Tobacco Use    Smoking status: Every Day     Current packs/day: 1.00     Average packs/day: 1 pack/day for 34.9 years (34.9 ttl pk-yrs)     Types: Cigarettes     Start date: 1990    Smokeless tobacco: Never   Substance Use Topics    Alcohol use: Never         Eyes - no sudden vision change or amaurosis.  Respiratory - no significant shortness of breath,  Cardiovascular - no chest pain or syncope.  No  significant leg swelling. has claudication.  Musculoskeletal - no gait disturbance  Skin - no new wound.  Neurologic -  see notes above  All other review of systems are negative.    Physical Exam    BP (!) 202/105 (Site: Right Upper Arm, Position: Sitting, Cuff Size: Medium Adult) Comment: notified Dyana  Pulse 97   SpO2 98%       Neck- carotid pulses 2+ to palpation with no bruit  Cardiovascular - Regular rate and rhythm.    Pulmonary -

## 2024-11-26 NOTE — H&P
internal carotid artery with reconstitution of flow in the cavernous segment.  The origin of the left common carotid artery is patent.  Calcified plaques are noted along the distal left common carotid artery without significant stenosis.  There is atherosclerotic calcification at the left carotid bifurcation and slightly extending into the proximal internal carotid artery without significant stenosis.  The right vertebral artery is normal in contour and caliber without significant stenosis.  There is moderate focal stenosis at the origin of the left vertebral artery.  No aneurysm or vascular malformation is identified.  There is multilevel disc desiccation and degenerative change involving posterior disc osteophyte complexes, facet joint hypertrophy, and uncovertebral joint hypertrophy resulting in various degrees of central canal and neural foraminal stenosis.      1. Occlusion of the right internal carotid artery with reconstitution of flow in the cavernous segment. 2. Atherosclerotic disease of the carotid arteries as detailed above. 3. Moderate focal stenosis at the origin of the left vertebral artery.  All CT scans are performed using dose optimization techniques as appropriate to the performed exam and include at least one of the following: Automated exposure control, adjustment of the mA and/or kV according to size, and the use of iterative reconstruction technique.  ______________________________________ Electronically signed by: MI DORADO M.D. Date:     11/26/2024 Time:    11:02       Assessment/Plan:  Principal Problem:    Cerebrovascular accident due to occlusion (HCC)  Active Problems:    Chronic bilateral low back pain without sciatica    Generalized anxiety disorder    Dyslipidemia    Nerve pain    Allergic rhinitis    COPD (chronic obstructive pulmonary disease) ([facility])  Resolved Problems:    * No resolved hospital problems. *     Principal Problem:    Cerebrovascular accident due to occlusion    -

## 2024-11-26 NOTE — ED PROVIDER NOTES
[facility]    Housing Stability       SCREENINGS   NIH Stroke Scale  Interval: Baseline  Level of Consciousness (1a): Alert  LOC Questions (1b): Answers both correctly  LOC Commands (1c): Performs both tasks correctly  Best Gaze (2): Normal  Visual (3): No visual loss  Facial Palsy (4): Normal symmetrical movement  Motor Arm, Left (5a): No drift  Motor Arm, Right (5b): No drift  Motor Leg, Left (6a): No drift  Motor Leg, Right (6b): No drift  Limb Ataxia (7): Absent  Sensory (8): Normal  Best Language (9): No aphasia  Dysarthria (10): Normal  Extinction and Inattention (11): No abnormality  Total: 0Glasgow Coma Scale  Eye Opening: Spontaneous  Best Verbal Response: Oriented  Best Motor Response: Obeys commands  Anni Coma Scale Score: 15        PHYSICAL EXAM    (up to 7 for level 4, 8 or more for level 5)     ED Triage Vitals [11/26/24 1137]   BP Systolic BP Percentile Diastolic BP Percentile Temp Temp src Pulse Respirations SpO2   (!) 195/105 -- -- 98.4 °F (36.9 °C) -- 85 20 98 %      Height Weight         -- --             Physical Exam  Vitals and nursing note reviewed.   Constitutional:       General: She is not in acute distress.     Appearance: She is well-developed. She is not toxic-appearing or diaphoretic.   HENT:      Head: Normocephalic and atraumatic.   Eyes:      General: No scleral icterus.        Right eye: No discharge.         Left eye: No discharge.      Pupils: Pupils are equal, round, and reactive to light.   Cardiovascular:      Rate and Rhythm: Normal rate and regular rhythm.      Pulses: Normal pulses.      Heart sounds: Normal heart sounds.   Pulmonary:      Effort: Pulmonary effort is normal. No respiratory distress.      Breath sounds: Normal breath sounds. No stridor. No wheezing or rhonchi.   Abdominal:      General: There is no distension.   Musculoskeletal:         General: No deformity. Normal range of motion.      Cervical

## 2024-11-27 ENCOUNTER — APPOINTMENT (OUTPATIENT)
Age: 49
DRG: 066 | End: 2024-11-27
Payer: COMMERCIAL

## 2024-11-27 ENCOUNTER — READMISSION MANAGEMENT (OUTPATIENT)
Dept: CALL CENTER | Facility: HOSPITAL | Age: 49
End: 2024-11-27
Payer: MEDICARE

## 2024-11-27 VITALS
RESPIRATION RATE: 18 BRPM | HEIGHT: 62 IN | SYSTOLIC BLOOD PRESSURE: 123 MMHG | WEIGHT: 126 LBS | DIASTOLIC BLOOD PRESSURE: 68 MMHG | TEMPERATURE: 97.2 F | BODY MASS INDEX: 23.19 KG/M2 | HEART RATE: 89 BPM | OXYGEN SATURATION: 91 %

## 2024-11-27 PROBLEM — I63.231 CEREBROVASCULAR ACCIDENT (CVA) DUE TO OCCLUSION OF RIGHT CAROTID ARTERY (HCC): Status: ACTIVE | Noted: 2024-11-27

## 2024-11-27 PROBLEM — I73.9 PERIPHERAL VASCULAR DISEASE (HCC): Status: ACTIVE | Noted: 2024-11-27

## 2024-11-27 PROBLEM — I63.9 CEREBROVASCULAR ACCIDENT (CVA) (HCC): Status: ACTIVE | Noted: 2024-11-27

## 2024-11-27 PROBLEM — E78.2 MIXED HYPERLIPIDEMIA: Status: ACTIVE | Noted: 2024-11-27

## 2024-11-27 PROBLEM — I25.10 CORONARY ARTERY DISEASE INVOLVING NATIVE CORONARY ARTERY OF NATIVE HEART WITHOUT ANGINA PECTORIS: Status: ACTIVE | Noted: 2024-11-27

## 2024-11-27 PROBLEM — I10 PRIMARY HYPERTENSION: Status: ACTIVE | Noted: 2024-11-27

## 2024-11-27 LAB
ANION GAP SERPL CALCULATED.3IONS-SCNC: 14 MMOL/L (ref 7–19)
BUN SERPL-MCNC: 9 MG/DL (ref 6–20)
CALCIUM SERPL-MCNC: 9.2 MG/DL (ref 8.6–10)
CHLORIDE SERPL-SCNC: 101 MMOL/L (ref 98–111)
CHOLEST SERPL-MCNC: 258 MG/DL (ref 0–199)
CO2 SERPL-SCNC: 25 MMOL/L (ref 22–29)
CREAT SERPL-MCNC: 0.7 MG/DL (ref 0.5–0.9)
ECHO AO ASC DIAM: 2.4 CM
ECHO AO ASCENDING AORTA INDEX: 1.53 CM/M2
ECHO AO ROOT DIAM: 1.6 CM
ECHO AO ROOT INDEX: 1.02 CM/M2
ECHO AO SINUS VALSALVA DIAM: 2.2 CM
ECHO AO SINUS VALSALVA INDEX: 1.4 CM/M2
ECHO AO ST JNCT DIAM: 1.7 CM
ECHO AV AREA PEAK VELOCITY: 1.1 CM2
ECHO AV AREA VTI: 1.1 CM2
ECHO AV AREA/BSA PEAK VELOCITY: 0.7 CM2/M2
ECHO AV AREA/BSA VTI: 0.7 CM2/M2
ECHO AV MEAN GRADIENT: 13 MMHG
ECHO AV MEAN VELOCITY: 1.7 M/S
ECHO AV PEAK GRADIENT: 23 MMHG
ECHO AV PEAK VELOCITY: 2.4 M/S
ECHO AV REGURGITANT FRACTION: -267 %
ECHO AV REGURGITANT VOLUME: -138.9 ML
ECHO AV VTI: 48.3 CM
ECHO BSA: 1.58 M2
ECHO EST RA PRESSURE: 3 MMHG
ECHO IVC PROX: 1.1 CM
ECHO LA AREA 2C: 11.2 CM2
ECHO LA AREA 4C: 11.8 CM2
ECHO LA DIAMETER INDEX: 2.23 CM/M2
ECHO LA DIAMETER: 3.5 CM
ECHO LA MAJOR AXIS: 4.3 CM
ECHO LA MINOR AXIS: 3.8 CM
ECHO LA TO AORTIC ROOT RATIO: 2.19
ECHO LA VOL BP: 28 ML (ref 22–52)
ECHO LA VOL MOD A2C: 28 ML (ref 22–52)
ECHO LA VOL MOD A4C: 26 ML (ref 22–52)
ECHO LA VOL/BSA BIPLANE: 18 ML/M2 (ref 16–34)
ECHO LA VOLUME INDEX MOD A2C: 18 ML/M2 (ref 16–34)
ECHO LA VOLUME INDEX MOD A4C: 17 ML/M2 (ref 16–34)
ECHO LV E' LATERAL VELOCITY: 5.44 CM/S
ECHO LV E' SEPTAL VELOCITY: 5.44 CM/S
ECHO LV EDV A2C: 48 ML
ECHO LV EDV A4C: 66 ML
ECHO LV EDV INDEX A4C: 42 ML/M2
ECHO LV EDV NDEX A2C: 31 ML/M2
ECHO LV EJECTION FRACTION A2C: 42 %
ECHO LV EJECTION FRACTION A4C: 53 %
ECHO LV EJECTION FRACTION BIPLANE: 50 % (ref 55–100)
ECHO LV ESV A2C: 28 ML
ECHO LV ESV A4C: 31 ML
ECHO LV ESV INDEX A2C: 18 ML/M2
ECHO LV ESV INDEX A4C: 20 ML/M2
ECHO LV FRACTIONAL SHORTENING: 27 % (ref 28–44)
ECHO LV GLOBAL LONGITUDINAL STRAIN (GLS): -17.6 %
ECHO LV INTERNAL DIMENSION DIASTOLE INDEX: 2.61 CM/M2
ECHO LV INTERNAL DIMENSION DIASTOLIC: 4.1 CM (ref 3.9–5.3)
ECHO LV INTERNAL DIMENSION SYSTOLIC INDEX: 1.91 CM/M2
ECHO LV INTERNAL DIMENSION SYSTOLIC: 3 CM
ECHO LV IVSD: 1.2 CM (ref 0.6–0.9)
ECHO LV MASS 2D: 171.7 G (ref 67–162)
ECHO LV MASS INDEX 2D: 109.4 G/M2 (ref 43–95)
ECHO LV POSTERIOR WALL DIASTOLIC: 1.2 CM (ref 0.6–0.9)
ECHO LV RELATIVE WALL THICKNESS RATIO: 0.59
ECHO LVOT AREA: 3.1 CM2
ECHO LVOT AV VTI INDEX: 0.34
ECHO LVOT DIAM: 2 CM
ECHO LVOT MEAN GRADIENT: 2 MMHG
ECHO LVOT PEAK GRADIENT: 3 MMHG
ECHO LVOT PEAK VELOCITY: 0.9 M/S
ECHO LVOT PEAK VELOCITY: 0.9 M/S
ECHO LVOT STROKE VOLUME INDEX: 33.2 ML/M2
ECHO LVOT SV: 52.1 ML
ECHO LVOT VTI: 16.6 CM
ECHO MV A VELOCITY: 1.81 M/S
ECHO MV ANNULUS DIAMETER: 1.2 CM
ECHO MV AREA VTI: 1.5 CM2
ECHO MV E DECELERATION TIME (DT): 174 MS
ECHO MV E VELOCITY: 1.19 M/S
ECHO MV E/A RATIO: 0.66
ECHO MV E/E' LATERAL: 21.88
ECHO MV E/E' RATIO (AVERAGED): 21.88
ECHO MV E/E' SEPTAL: 21.88
ECHO MV LVOT VTI INDEX: 2.13
ECHO MV MAX VELOCITY: 2.1 M/S
ECHO MV MEAN GRADIENT: 7 MMHG
ECHO MV MEAN VELOCITY: 1.3 M/S
ECHO MV PEAK GRADIENT: 18 MMHG
ECHO MV REGURGITANT FRACTION CONT EQ: 73 %
ECHO MV REGURGITANT PEAK GRADIENT: 144 MMHG
ECHO MV REGURGITANT PEAK VELOCITY: 6 M/S
ECHO MV REGURGITANT VOLUME: 138.91 ML
ECHO MV REGURGITANT VTIA: 169 CM
ECHO MV VTI: 35.3 CM
ECHO RA AREA 4C: 9.3 CM2
ECHO RA END SYSTOLIC VOLUME APICAL 4 CHAMBER INDEX BSA: 14 ML/M2
ECHO RA VOLUME: 22 ML
ECHO RIGHT VENTRICULAR SYSTOLIC PRESSURE (RVSP): 29 MMHG
ECHO RV BASAL DIMENSION: 2.5 CM
ECHO RV INTERNAL DIMENSION: 2.3 CM
ECHO RV LONGITUDINAL DIMENSION: 6 CM
ECHO RV MID DIMENSION: 2.2 CM
ECHO RV TAPSE: 1 CM (ref 1.7–?)
ECHO TV REGURGITANT MAX VELOCITY: 2.56 M/S
ECHO TV REGURGITANT PEAK GRADIENT: 26 MMHG
ERYTHROCYTE [DISTWIDTH] IN BLOOD BY AUTOMATED COUNT: 12.8 % (ref 11.5–14.5)
GLUCOSE SERPL-MCNC: 111 MG/DL (ref 70–99)
HBA1C MFR BLD: 6 % (ref 4–5.6)
HCT VFR BLD AUTO: 42.2 % (ref 37–47)
HDLC SERPL-MCNC: 67 MG/DL (ref 40–60)
HGB BLD-MCNC: 13.9 G/DL (ref 12–16)
LDLC SERPL CALC-MCNC: 170 MG/DL
MAGNESIUM SERPL-MCNC: 1.9 MG/DL (ref 1.6–2.6)
MCH RBC QN AUTO: 32.2 PG (ref 27–31)
MCHC RBC AUTO-ENTMCNC: 32.9 G/DL (ref 33–37)
MCV RBC AUTO: 97.7 FL (ref 81–99)
PLATELET # BLD AUTO: 311 K/UL (ref 130–400)
PMV BLD AUTO: 9.4 FL (ref 9.4–12.3)
POTASSIUM SERPL-SCNC: 3.1 MMOL/L (ref 3.5–5)
RBC # BLD AUTO: 4.32 M/UL (ref 4.2–5.4)
SODIUM SERPL-SCNC: 140 MMOL/L (ref 136–145)
TRIGL SERPL-MCNC: 107 MG/DL (ref 0–149)
VAS LEFT ARM BP DIA: 80 MMHG
VAS LEFT ARM BP: 160 MMHG
VAS LEFT CCA MID EDV: 42.3 CM/S
VAS LEFT CCA MID PSV: 132 CM/S
VAS LEFT CCA PROX EDV: 30.8 CM/S
VAS LEFT CCA PROX PSV: 104 CM/S
VAS LEFT ECA EDV: 19.6 CM/S
VAS LEFT ECA PSV: 106 CM/S
VAS LEFT ICA DIST EDV: 22 CM/S
VAS LEFT ICA DIST PSV: 67.2 CM/S
VAS LEFT ICA MID EDV: 63.1 CM/S
VAS LEFT ICA MID PSV: 180 CM/S
VAS LEFT ICA PROX EDV: 68.6 CM/S
VAS LEFT ICA PROX PSV: 184 CM/S
VAS LEFT VERTEBRAL EDV: 17.5 CM/S
VAS LEFT VERTEBRAL PSV: 46.9 CM/S
VAS RIGHT ARM BP DIA: 60 MMHG
VAS RIGHT ARM BP: 162 MMHG
VAS RIGHT CCA MID EDV: 25.5 CM/S
VAS RIGHT CCA MID PSV: 91.9 CM/S
VAS RIGHT CCA PROX EDV: 19.9 CM/S
VAS RIGHT CCA PROX PSV: 83.9 CM/S
VAS RIGHT ECA EDV: 20.9 CM/S
VAS RIGHT ECA PSV: 185 CM/S
VAS RIGHT ICA DIST EDV: 25.2 CM/S
VAS RIGHT ICA DIST PSV: 73.6 CM/S
VAS RIGHT ICA MID EDV: 74.1 CM/S
VAS RIGHT ICA MID PSV: 161 CM/S
VAS RIGHT ICA PROX EDV: 182 CM/S
VAS RIGHT ICA PROX PSV: 514 CM/S
VAS RIGHT VERTEBRAL EDV: 15.4 CM/S
VAS RIGHT VERTEBRAL PSV: 46.2 CM/S
WBC # BLD AUTO: 7.6 K/UL (ref 4.8–10.8)

## 2024-11-27 PROCEDURE — 85027 COMPLETE CBC AUTOMATED: CPT

## 2024-11-27 PROCEDURE — 6370000000 HC RX 637 (ALT 250 FOR IP)

## 2024-11-27 PROCEDURE — 80048 BASIC METABOLIC PNL TOTAL CA: CPT

## 2024-11-27 PROCEDURE — 99222 1ST HOSP IP/OBS MODERATE 55: CPT | Performed by: NURSE PRACTITIONER

## 2024-11-27 PROCEDURE — 83036 HEMOGLOBIN GLYCOSYLATED A1C: CPT

## 2024-11-27 PROCEDURE — 99221 1ST HOSP IP/OBS SF/LOW 40: CPT | Performed by: SURGERY

## 2024-11-27 PROCEDURE — 94760 N-INVAS EAR/PLS OXIMETRY 1: CPT

## 2024-11-27 PROCEDURE — 93356 MYOCRD STRAIN IMG SPCKL TRCK: CPT | Performed by: INTERNAL MEDICINE

## 2024-11-27 PROCEDURE — 2580000003 HC RX 258

## 2024-11-27 PROCEDURE — 36415 COLL VENOUS BLD VENIPUNCTURE: CPT

## 2024-11-27 PROCEDURE — C8929 TTE W OR WO FOL WCON,DOPPLER: HCPCS

## 2024-11-27 PROCEDURE — 92610 EVALUATE SWALLOWING FUNCTION: CPT

## 2024-11-27 PROCEDURE — 99232 SBSQ HOSP IP/OBS MODERATE 35: CPT | Performed by: PSYCHIATRY & NEUROLOGY

## 2024-11-27 PROCEDURE — 80061 LIPID PANEL: CPT

## 2024-11-27 PROCEDURE — 92523 SPEECH SOUND LANG COMPREHEN: CPT

## 2024-11-27 PROCEDURE — 93306 TTE W/DOPPLER COMPLETE: CPT | Performed by: INTERNAL MEDICINE

## 2024-11-27 PROCEDURE — 6360000002 HC RX W HCPCS: Performed by: STUDENT IN AN ORGANIZED HEALTH CARE EDUCATION/TRAINING PROGRAM

## 2024-11-27 PROCEDURE — 6370000000 HC RX 637 (ALT 250 FOR IP): Performed by: HOSPITALIST

## 2024-11-27 PROCEDURE — 94640 AIRWAY INHALATION TREATMENT: CPT

## 2024-11-27 PROCEDURE — 6360000004 HC RX CONTRAST MEDICATION

## 2024-11-27 PROCEDURE — 83735 ASSAY OF MAGNESIUM: CPT

## 2024-11-27 RX ORDER — NICOTINE 21 MG/24HR
1 PATCH, TRANSDERMAL 24 HOURS TRANSDERMAL DAILY
Qty: 30 PATCH | Refills: 3 | Status: SHIPPED | OUTPATIENT
Start: 2024-11-28

## 2024-11-27 RX ORDER — CLOPIDOGREL BISULFATE 75 MG/1
75 TABLET ORAL DAILY
Status: DISCONTINUED | OUTPATIENT
Start: 2024-11-27 | End: 2024-11-27 | Stop reason: HOSPADM

## 2024-11-27 RX ORDER — CLOPIDOGREL BISULFATE 75 MG/1
75 TABLET ORAL DAILY
Qty: 30 TABLET | Refills: 3 | Status: SHIPPED | OUTPATIENT
Start: 2024-11-27

## 2024-11-27 RX ORDER — OXYCODONE AND ACETAMINOPHEN 10; 325 MG/1; MG/1
1 TABLET ORAL EVERY 6 HOURS PRN
Status: DISCONTINUED | OUTPATIENT
Start: 2024-11-27 | End: 2024-11-27 | Stop reason: HOSPADM

## 2024-11-27 RX ADMIN — POTASSIUM CHLORIDE 40 MEQ: 1500 TABLET, EXTENDED RELEASE ORAL at 08:49

## 2024-11-27 RX ADMIN — TIZANIDINE 4 MG: 4 TABLET ORAL at 11:33

## 2024-11-27 RX ADMIN — KETOROLAC TROMETHAMINE 15 MG: 30 INJECTION, SOLUTION INTRAMUSCULAR at 04:25

## 2024-11-27 RX ADMIN — ASPIRIN 81 MG: 81 TABLET, COATED ORAL at 08:50

## 2024-11-27 RX ADMIN — OXYCODONE AND ACETAMINOPHEN 1 TABLET: 10; 325 TABLET ORAL at 10:02

## 2024-11-27 RX ADMIN — ROSUVASTATIN CALCIUM 40 MG: 20 TABLET, COATED ORAL at 08:49

## 2024-11-27 RX ADMIN — BUDESONIDE AND FORMOTEROL FUMARATE DIHYDRATE 2 PUFF: 160; 4.5 AEROSOL RESPIRATORY (INHALATION) at 07:50

## 2024-11-27 RX ADMIN — TIZANIDINE 4 MG: 4 TABLET ORAL at 03:29

## 2024-11-27 RX ADMIN — SODIUM CHLORIDE, PRESERVATIVE FREE 1.5 ML: 5 INJECTION INTRAVENOUS at 08:05

## 2024-11-27 RX ADMIN — CILOSTAZOL 100 MG: 100 TABLET ORAL at 08:50

## 2024-11-27 ASSESSMENT — PAIN SCALES - GENERAL
PAINLEVEL_OUTOF10: 10
PAINLEVEL_OUTOF10: 10
PAINLEVEL_OUTOF10: 6

## 2024-11-27 ASSESSMENT — PAIN DESCRIPTION - LOCATION
LOCATION: BACK;HIP
LOCATION: HIP
LOCATION: HIP;LEG

## 2024-11-27 ASSESSMENT — PAIN DESCRIPTION - ORIENTATION
ORIENTATION: RIGHT
ORIENTATION: RIGHT
ORIENTATION: MID;RIGHT

## 2024-11-27 ASSESSMENT — PAIN - FUNCTIONAL ASSESSMENT
PAIN_FUNCTIONAL_ASSESSMENT: PREVENTS OR INTERFERES SOME ACTIVE ACTIVITIES AND ADLS
PAIN_FUNCTIONAL_ASSESSMENT: ACTIVITIES ARE NOT PREVENTED

## 2024-11-27 ASSESSMENT — PAIN DESCRIPTION - DESCRIPTORS
DESCRIPTORS: ACHING
DESCRIPTORS: PATIENT UNABLE TO DESCRIBE
DESCRIPTORS: STABBING

## 2024-11-27 NOTE — DISCHARGE INSTR - DIET

## 2024-11-27 NOTE — CONSULTS
Consultation vascular surgery    She i has been seen by our office for carotid disease and lower extremity peripheral vascular disease.  She had carotid duplex that showed high-grade stenosis of the right side.  She was scheduled for a CAT scan of head and neck and follow-up in office.  However based on the CAT scan findings she was positive for right sided stroke and occluded right carotid.  Her head CT was positive for brain aneurysm.  She was sent to ER for stroke workup.  We are consulted to see her.     Her current treatment includes ASA 81 mg po qd.She denies a history of CVA. She has no history of prior carotid surgery.   .    Nae Meyers is a 49 y.o. female with the following history reviewed and recorded in [employer]:  Patient Active Problem List    Diagnosis Date Noted    Cerebrovascular accident (CVA) due to occlusion of right carotid artery (HCC) 11/27/2024    Primary hypertension 11/27/2024    Mixed hyperlipidemia 11/27/2024    Coronary artery disease involving native coronary artery of native heart without angina pectoris 11/27/2024    Peripheral vascular disease (HCC) 11/27/2024    Cerebrovascular accident due to occlusion (HCC) 11/26/2024    COPD (chronic obstructive pulmonary disease) (HCC) 11/26/2024    Allergic rhinitis     Chronic diastolic congestive heart failure (HCC) 03/24/2022    Moderate episode of recurrent major depressive disorder (HCC) 03/24/2022    Lymphoma of intra-abdominal lymph nodes, unspecified lymphoma type (HCC) 09/30/2021    Atherosclerosis of native artery of both lower extremities with intermittent claudication (HCC) 08/04/2021    Mesenteric artery stenosis (HCC)     Nerve pain 03/20/2021    Pain medication agreement 03/20/2021    Chest pain with moderate risk for cardiac etiology 02/18/2021    Cognitive deficit as late effect of cerebrovascular accident (CVA) 02/18/2021    Generalized anxiety disorder 02/18/2021    Dyslipidemia 02/18/2021    Chronic bilateral low back pain

## 2024-11-27 NOTE — DISCHARGE SUMMARY
Discharge Summary      Date:11/27/2024        Patient Name:Nae Meyers     YOB: 1975     Age:49 y.o.    Admit Date:11/26/2024   Admission Condition:fair   Discharged Condition:stable  Discharge Date: 11/27/24       Discharge Diagnoses   Principal Problem:    Cerebrovascular accident due to occlusion (HCC)  Active Problems:    Chronic bilateral low back pain without sciatica    Generalized anxiety disorder    Dyslipidemia    Nerve pain    Allergic rhinitis    COPD (chronic obstructive pulmonary disease) (HCC)    Cerebrovascular accident (CVA) due to occlusion of right carotid artery (HCC)    Primary hypertension    Mixed hyperlipidemia    Coronary artery disease involving native coronary artery of native heart without angina pectoris    Peripheral vascular disease (HCC)  Resolved Problems:    * No resolved hospital problems. *      Hospital Stay   Narrative of Hospital Course:     49 y.o. female with history of stroke comes to ED for evaluation after outpatient imaging showed evidence of acute infarct in the right frontal lobe. Patient was being worked up for carotid bruit and had routine outpatient CTA today with above findings. 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 Was seen by NS who recc

## 2024-11-27 NOTE — DISCHARGE INSTRUCTIONS
Call on Monday and schedule follow up appointments  Take medications as directed  Return to ER immediately for recurrent or worsening symptoms  
normal/clear to auscultation bilaterally/no wheezes/no rales/no rhonchi

## 2024-11-27 NOTE — CONSULTS
NEUROSURGERY  CONSULT    CHIEF COMPLAINT: Abnormal CTA    HISTORY OF PRESENT ILLNESS:      The patient is a 49 y.o. 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  MRI brain revealed an acute right MCA vascular territory infarct involving the right frontal lobe, right insula right basal ganglia, and right parietal lobe.  Neurology has evaluated and ordered a full stroke workup which includes echo and carotid ultrasound.  These were completed this morning.  Neurology favors anticoagulation for 6 months.    Today she complains of a headache that has been present for about 2 to 3 days.  She states about 3 days ago she fell asleep on her couch had the urge to urinate, stood up, urinated on herself, and fell to the floor.  She did call 911 and when EMS got there she refused to come to the ED.  She admits to having blurry vision and recently got new glasses which has helped.  She denies any new numbness, paresthesias, or weakness.  She does admit to head trauma about 4 years ago where she had a broken eye socket and was told she had a brain bleed.  She did not undergo any surgical intervention.  This was in [place].    She mentions having right groin pain from a previous resection of lymph nodes stating it was lymphoma.    She is a smoker.  States she has not smoked in the last 3 days.  She was taking a daily 81 mg aspirin.      Past Medical History:   Diagnosis Date    Allergic rhinitis     Cancer (HCC)     cervical, lymphoma    Congenital heart disease     Mesenteric artery stenosis (HCC)     MRSA infection     Stroke (HCC)     after heart surgery 2013       Past Surgical History:   Procedure Laterality Date    CARDIAC SURGERY      Ross procedure and surgery prior on valve    PARTIAL HYSTERECTOMY (CERVIX

## 2024-11-27 NOTE — PROGRESS NOTES
Occupational Therapy               RE: Nae Meyers         MRN: [id number]      Pt declining need for PT/OT services and reporting that she has been up in the room ad brenden. Pt does not display any deficits that would warrant further OT services in this setting. Pt can increase stamina with progression of general activity according to pt's tolerance. OT does not anticipate any environmental barriers to D/C home once medically cleared if PRN assist is available.       CALI Shelley/L  Electronically signed by Ivon LEIVA/L on 11/27/2024 at 10:43 AM.    
    Patient seen and examined at bedside. Patient asking for pain medications, reporting a severe headache that she has had for several days, also chronic right groin/thigh/back pain. consult will follow.   
 [facility]  [address], [address]  [place] [address]  Phone [phone]     Neurology Progress Note  2024 12:37 PM  Information:   Patient Name: Nae Meyers  :   1975  Age:   49 y.o.  MRN:   [id number]  Account #:  [id number]  Admit Date:   2024  Today:  24     ADMIT DX:   Cerebrovascular accident due to occlusion (HCC)    Subjective:     Nae Meyers is a 49 y.o. year old woman with PVD and CAD who had an outpatient CTA head and neck today and was found to have an acute stroke and occluded right ICA.     Interval History:   She has had no numbness or weakness.  She has been up to the bathroom a few times.  She complains of back and right hip pain that are chronic and for which she takes opioids and sees pain management.    Objective:     Past Medical History:  Past Medical History:   Diagnosis Date    Allergic rhinitis     Cancer (HCC)     cervical, lymphoma    Congenital heart disease     Mesenteric artery stenosis (HCC)     MRSA infection     Stroke (HCC)     after heart surgery        Past Surgical History:   Procedure Laterality Date    CARDIAC SURGERY      Ross procedure and surgery prior on valve    PARTIAL HYSTERECTOMY (CERVIX NOT REMOVED)         Family History   Problem Relation Age of Onset    Diabetes Mother     Heart Disease Mother     Cancer Father        Social History     Socioeconomic History    Marital status: Single     Spouse name: Not on file    Number of children: Not on file    Years of education: Not on file    Highest education level: Not on file   Occupational History    Not on file   Tobacco Use    Smoking status: Every Day     Current packs/day: 1.00     Average packs/day: 1 pack/day for 34.9 years (34.9 ttl pk-yrs)     Types: Cigarettes     Start date:     Smokeless tobacco: Never   Substance and Sexual Activity    Alcohol use: Never    Drug use: Never    Sexual activity: Not on file   Other Topics Concern    Not on file   Social History 
..STROKE ADMISSION    Date of Note:  11/26/2024  Time of Note:  4:05 PM    Patient Name:  Nae Meyers  MRN:  [id number]  YOB: 1975  Age:      49 y.o.  Gender:      female    Room:  [facility]   Adm. Date: 11/26/2024  Adm. Status:   Allergies: Ceclor [cefaclor], Lortab [hydrocodone-acetaminophen], Penicillins, Sulfa antibiotics, and Nabumetone  Code Status: Full Code    Adm. Provider: Aide Linares MD  Neuro. Provider: Dr. Rishi Serrano      Presentation(s)    ED Chief Complaint  Chief Complaint   Patient presents with    Cerebrovascular Accident     Sent from outpatient vascular after CTA showed acute/subacute infarct; pt c/o right ear pain, states episodes of incontinence and fall 2 nights ago       ED Impression  1. Cerebrovascular accident (CVA), unspecified mechanism (HCC)    2. Right internal carotid occlusion             Admission Impression  Principal Problem:    Cerebrovascular accident due to occlusion (HCC)  Resolved Problems:    * No resolved hospital problems. *                    Last Known Well Date & Time         Current NIHSS:  NIH Stroke Scale  NIH Stroke Scale Assessed: Yes  Interval: Reassessment  Level of Consciousness (1a): Alert  LOC Questions (1b): Answers both correctly  LOC Commands (1c): Performs both tasks correctly  Best Gaze (2): Normal  Visual (3): No visual loss  Facial Palsy (4): Normal symmetrical movement  Motor Arm, Left (5a): No drift  Motor Arm, Right (5b): No drift  Motor Leg, Left (6a): No drift  Motor Leg, Right (6b): No drift  Limb Ataxia (7): Absent  Sensory (8): Normal  Best Language (9): No aphasia  Dysarthria (10): Normal  Extinction and Inattention (11): No abnormality  Total: 0    Current GCS:  Brashear Coma Scale  Eye Opening: Spontaneous  Best Verbal Response: Oriented  Best Motor Response: Obeys commands  Brashear Coma Scale Score: 15      Education & Care Plan    [x]    Education Assessment Completed      Patient preferred method of 
4 Eyes Skin Assessment     NAME:  Nae Meyers  YOB: 1975  MEDICAL RECORD NUMBER:  [id number]    The patient is being assessed for  Admission    I agree that at least one RN has performed a thorough Head to Toe Skin Assessment on the patient. ALL assessment sites listed below have been assessed.      Areas assessed by both nurses:    Head, Face, Ears, Shoulders, Back, Chest, Arms, Elbows, Hands, Sacrum. Buttock, Coccyx, Ischium, Legs. Feet and Heels, and Under Medical Devices         Does the Patient have a Wound? No noted wound(s)       Luis Carlos Prevention initiated by RN: No  Wound Care Orders initiated by RN: No    Pressure Injury (Stage 3,4, Unstageable, DTI, NWPT, and Complex wounds) if present, place Wound referral order by RN under : No    New Ostomies, if present place, Ostomy referral order under : No     Nurse 1 eSignature: Electronically signed by Radha Rodriguez RN on 11/26/24 at 4:04 PM CST    **SHARE this note so that the co-signing nurse can place an eSignature**    Nurse 2 eSignature: Electronically signed by Marisela Connell RN on 11/26/24 at 4:59 PM CST   
Called Vascular Surgery consult to 's office and spoke to Narda @ [phone]. Placed on 's  list.   Electronically signed by Rosalva Reyes on 11/26/2024 at 4:25 PM    
Patient off floor during rounds this AM.  Will see later in the day.      Joan Russo, MELIDA   
Spiritual Health History and Assessment/Progress Note  [facility]    (P) Loneliness/Social Isolation, (P) Relationship concerns, (P) Adjustment to illness,      Name: Nae Meyers MRN: [id number]    Age: 49 y.o.     Sex: female   Language: English   Oriental orthodox: Jewish   Cerebrovascular accident due to occlusion (HCC)     Date: 11/27/2024            Total Time Calculated: (P) 30 min              Spiritual Assessment continued in [facility] 5 SURG SERVICES        Referral/Consult From: (P) Rounding   Encounter Overview/Reason: (P) Loneliness/Social Isolation  Service Provided For: (P) Patient, Friend    Jessie, Belief, Meaning:   Patient identifies as spiritual. The patient said she is a faithful Latter day and her family also have a similar Jewish value.    Family/Friends are connected with a jessie tradition or spiritual practice      Importance and Influence:  Patient has spiritual/personal beliefs that influence decisions regarding their health  Family/Friends have spiritual/personal beliefs that influence decisions regarding the patient's health    Community:  Patient is connected with a spiritual community  Family/Friends feel well-supported. Support system includes: Jessie Community and Friends    Assessment and Plan of Care:   The patient did not show any stress or anxiety during the conversation and she missed her puppy dog and she was emotionally attached to the pet dog .  Her association with the Latter-day provides for her the spiritual support and connection with the community and with herself aswell.     Patient Interventions include: Facilitated expression of thoughts and feelings  Family/Friends Interventions include: Explored spiritual coping/struggle/distress    Patient Plan of Care: No spiritual needs identified for follow-up  Family/Friends Plan of Care: No future visits per patient/family request    Electronically signed by Shelia Kramer on 11/27/2024 at 4:18 PM        
Mode of Expression:  (Patient was 2/2 confrontation naming on MINI. Structured responsive speech and responses in natural conversation were considered to be appropriate.)     Motor Speech:  (SLP ranked functional intelligibility of speech for unfamiliar listeners at 100% in utterances with background noise present.)     Overall Orientation Status:  (Patient demonstrated ability to verbalize name, birthday, address, season, month, state, hospital, and floor of hospital independently. Patient did not verbalize age, phone number, year, date, [place], county, or city at independent level.)     Memory:  (Patient was 3/3 registration, 1/3 recall, and 1/1 repeat phrase on MINI. Patient demonstrated appropriate immediate memory with sequences of unrelated numbers/words set up to 5 items without repetitions provided.)     Problem Solving:  (It is noted that during evaluation, patient did not verbalize PCP independently. Patient verbalized pharmacy at independent level. Patient demonstrated mild difficulty verbalizing conditions in which she takes medications independently. Patient demonstrated ability to verbalize appropriate simple solutions to situations that could occur during activities of daily living at independent level. Patient demonstrated ability to calculate time for medication management independently.)     Additional Assessment:   Assessed patient's swallowing function. With regular solid consistency trials presented independently, patient exhibited adequate oral prep. Oral transit of regular solid consistency primarily varied from 1-3 seconds in length and min oral cavity residue was noted post swallows; residue cleared from the mouth with additional dry swallows. Oral transit of thin H2O trials, presented independently via straw, primarily measured 1 second in length. Laryngeal elevation during swallow initiation was considered to be functional in timing and strength and no outward S/S penetration/aspiration was

## 2024-11-27 NOTE — PLAN OF CARE
Problem: Chronic Conditions and Co-morbidities  Goal: Patient's chronic conditions and co-morbidity symptoms are monitored and maintained or improved  11/27/2024 1025 by Yoli Benitez  Outcome: Progressing  Flowsheets (Taken 11/27/2024 0850)  Care Plan - Patient's Chronic Conditions and Co-Morbidity Symptoms are Monitored and Maintained or Improved:   Monitor and assess patient's chronic conditions and comorbid symptoms for stability, deterioration, or improvement   Collaborate with multidisciplinary team to address chronic and comorbid conditions and prevent exacerbation or deterioration   Update acute care plan with appropriate goals if chronic or comorbid symptoms are exacerbated and prevent overall improvement and discharge  11/27/2024 0115 by Yosef Davis RN  Outcome: Progressing  Flowsheets (Taken 11/26/2024 2024)  Care Plan - Patient's Chronic Conditions and Co-Morbidity Symptoms are Monitored and Maintained or Improved: Monitor and assess patient's chronic conditions and comorbid symptoms for stability, deterioration, or improvement     Problem: Discharge Planning  Goal: Discharge to home or other facility with appropriate resources  11/27/2024 1025 by Yoli Benitez  Outcome: Progressing  Flowsheets (Taken 11/27/2024 0850)  Discharge to home or other facility with appropriate resources:   Identify barriers to discharge with patient and caregiver   Arrange for needed discharge resources and transportation as appropriate   Identify discharge learning needs (meds, wound care, etc)   Arrange for interpreters to assist at discharge as needed   Refer to discharge planning if patient needs post-hospital services based on physician order or complex needs related to functional status, cognitive ability or social support system  11/27/2024 0115 by Yosef Davis, RN  Outcome: Progressing  Flowsheets (Taken 11/26/2024 2024)  Discharge to home or other facility with appropriate resources: Identify barriers to discharge with

## 2024-11-28 NOTE — OUTREACH NOTE
Prep Survey      Flowsheet Row Responses   [facility] patient discharged from? Non-BH   Is LACE score < 7 ? Non-BH Discharge   Eligibility [facility]   Date of Admission 11/26/24   Date of Discharge 11/27/24   Discharge Disposition Home or Self Care   Discharge diagnosis Cerebrovascular accident   Does the patient have one of the following disease processes/diagnoses(primary or secondary)? Stroke   Does the patient have Home health ordered? No   Is there a DME ordered? No   Prep survey completed? Yes            PRINCE ORTIZ - Registered Nurse

## 2024-11-29 ENCOUNTER — TRANSITIONAL CARE MANAGEMENT TELEPHONE ENCOUNTER (OUTPATIENT)
Dept: CALL CENTER | Facility: HOSPITAL | Age: 49
End: 2024-11-29
Payer: MEDICARE

## 2024-11-29 NOTE — OUTREACH NOTE
Call Center TCM Note      Flowsheet Row Responses   [facility] patient discharged from? Non-BH   Does the patient have one of the following disease processes/diagnoses(primary or secondary)? Other   TCM attempt successful? No   Unsuccessful attempts Attempt 2   Call Status Left message            Alison Linton RN    11/29/2024, 14:53 CST

## 2024-11-29 NOTE — OUTREACH NOTE
Call Center TCM Note      Flowsheet Row Responses   [facility] patient discharged from? Non-BH   Does the patient have one of the following disease processes/diagnoses(primary or secondary)? Other   TCM attempt successful? No   Unsuccessful attempts Attempt 1   Call Status Left message            Alison Linton RN    11/29/2024, 13:15 CST

## 2024-11-30 ENCOUNTER — TRANSITIONAL CARE MANAGEMENT TELEPHONE ENCOUNTER (OUTPATIENT)
Dept: CALL CENTER | Facility: HOSPITAL | Age: 49
End: 2024-11-30
Payer: MEDICARE

## 2024-11-30 NOTE — OUTREACH NOTE
Call Center TCM Note      Flowsheet Row Responses   [facility] [place] patient discharged from? Non-  [[facility]]   Does the patient have one of the following disease processes/diagnoses(primary or secondary)? Other   TCM attempt successful? No   Unsuccessful attempts Attempt 3  [No one listed on  PCP verbal release.]   Revoked Reason Other  [attempted x 3, unsuccessful. Revoked per unit policy.]   Does the patient have an appointment with their PCP within 7-14 days of discharge? No   Nursing Interventions Routed TCM call to PCP office, PCP office requested to make appointment - message sent            Shavonne Segura RN    11/30/2024, 15:09 CST

## 2024-12-02 ENCOUNTER — OFFICE VISIT (OUTPATIENT)
Dept: FAMILY MEDICINE CLINIC | Facility: CLINIC | Age: 49
End: 2024-12-02
Payer: MEDICARE

## 2024-12-02 VITALS
DIASTOLIC BLOOD PRESSURE: 70 MMHG | TEMPERATURE: 97.9 F | RESPIRATION RATE: 19 BRPM | BODY MASS INDEX: 24.32 KG/M2 | HEIGHT: 61 IN | SYSTOLIC BLOOD PRESSURE: 126 MMHG | HEART RATE: 103 BPM | OXYGEN SATURATION: 99 % | WEIGHT: 128.8 LBS

## 2024-12-02 DIAGNOSIS — I65.21 RIGHT-SIDED CAROTID ARTERY OBSTRUCTION: ICD-10-CM

## 2024-12-02 DIAGNOSIS — H92.01 RIGHT EAR PAIN: ICD-10-CM

## 2024-12-02 DIAGNOSIS — H65.04 RECURRENT ACUTE SEROUS OTITIS MEDIA OF RIGHT EAR: ICD-10-CM

## 2024-12-02 DIAGNOSIS — E53.8 B12 DEFICIENCY: ICD-10-CM

## 2024-12-02 DIAGNOSIS — Z86.73 HISTORY OF CVA (CEREBROVASCULAR ACCIDENT): Primary | ICD-10-CM

## 2024-12-02 DIAGNOSIS — Z92.89 HISTORY OF RECENT HOSPITALIZATION: ICD-10-CM

## 2024-12-02 PROCEDURE — 1160F RVW MEDS BY RX/DR IN RCRD: CPT | Performed by: NURSE PRACTITIONER

## 2024-12-02 PROCEDURE — 1111F DSCHRG MED/CURRENT MED MERGE: CPT | Performed by: NURSE PRACTITIONER

## 2024-12-02 PROCEDURE — 99495 TRANSJ CARE MGMT MOD F2F 14D: CPT | Performed by: NURSE PRACTITIONER

## 2024-12-02 PROCEDURE — 1159F MED LIST DOCD IN RCRD: CPT | Performed by: NURSE PRACTITIONER

## 2024-12-02 PROCEDURE — 1125F AMNT PAIN NOTED PAIN PRSNT: CPT | Performed by: NURSE PRACTITIONER

## 2024-12-02 PROCEDURE — 96372 THER/PROPH/DIAG INJ SC/IM: CPT | Performed by: NURSE PRACTITIONER

## 2024-12-02 RX ORDER — CLOPIDOGREL BISULFATE 75 MG/1
1 TABLET ORAL DAILY
COMMUNITY
Start: 2024-11-27

## 2024-12-02 RX ORDER — CYANOCOBALAMIN 1000 UG/ML
1000 INJECTION, SOLUTION INTRAMUSCULAR; SUBCUTANEOUS
Status: SHIPPED | OUTPATIENT
Start: 2024-12-02

## 2024-12-02 RX ORDER — KETOROLAC TROMETHAMINE 30 MG/ML
60 INJECTION, SOLUTION INTRAMUSCULAR; INTRAVENOUS ONCE
Status: COMPLETED | OUTPATIENT
Start: 2024-12-02 | End: 2024-12-02

## 2024-12-02 RX ADMIN — KETOROLAC TROMETHAMINE 60 MG: 30 INJECTION, SOLUTION INTRAMUSCULAR; INTRAVENOUS at 13:49

## 2024-12-02 RX ADMIN — CYANOCOBALAMIN 1000 MCG: 1000 INJECTION, SOLUTION INTRAMUSCULAR; SUBCUTANEOUS at 13:48

## 2024-12-02 NOTE — PROGRESS NOTES
Transitional Care Follow Up Visit  Subjective     Maday Fung is a 49 y.o. female who presents for a transitional care management visit.    Within 48 business hours after discharge our office contacted her via telephone to coordinate her care and needs.      I reviewed and discussed the details of that call along with the discharge summary, hospital problems, inpatient lab results, inpatient diagnostic studies, and consultation reports with Maday.     Current outpatient and discharge medications have been reconciled for the patient.  Reviewed by: DEEPTHI Duncan          11/27/2024     6:38 PM   Date of TCM Phone Call   [facility]   Date of Admission 11/26/2024   Date of Discharge 11/27/2024   Discharge Disposition Home or Self Care     Risk for Readmission (LACE) No data recorded    History of Present Illness   49 y.o. female with history of stroke comes to ED for evaluation after outpatient imaging showed evidence of acute infarct in the right frontal lobe. Patient was being worked up for carotid bruit and had routine outpatient CTA today with above findings. 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 Was seen by NS who recc follow up with vascular neurosurgeon. Patient stated will follow up with  "PCP for referral. Seen by neurology and patient on ASA, started on plavix and emphasized compliance with statin. Vascular surgery cleared for discharge home to follow up outpt.  She is supposed to get an appointment with a neurosurgeon to perform an endarterectomy.     The following portions of the patient's history were reviewed and updated as appropriate: allergies, current medications, past family history, past medical history, past social history, past surgical history, and problem list.    Review of Systems   Constitutional: Negative.    Eyes: Negative.    Respiratory: Negative.     Cardiovascular: Negative.    Gastrointestinal: Negative.    Endocrine: Negative.    Genitourinary: Negative.    Musculoskeletal:  Positive for arthralgias.        Pain right side of neck   Allergic/Immunologic: Negative.    Neurological: Negative.    Hematological: Negative.        Objective   /70 (BP Location: Left arm, Patient Position: Sitting, Cuff Size: Adult)   Pulse 103   Temp 97.9 °F (36.6 °C) (Oral)   Resp 19   Ht 154.9 cm (61\")   Wt 58.4 kg (128 lb 12.8 oz)   SpO2 99%   BMI 24.34 kg/m²   Physical Exam  Vitals and nursing note reviewed.   Constitutional:       Appearance: Normal appearance.   HENT:      Right Ear: Tympanic membrane is bulging.      Left Ear: Tympanic membrane is bulging.   Cardiovascular:      Rate and Rhythm: Normal rate and regular rhythm.      Pulses: Normal pulses.      Heart sounds: Normal heart sounds.   Pulmonary:      Effort: Pulmonary effort is normal.      Breath sounds: Normal breath sounds and air entry.   Skin:     General: Skin is warm and dry.   Neurological:      General: No focal deficit present.      Mental Status: She is alert and oriented to person, place, and time.   Psychiatric:         Mood and Affect: Mood normal.         Behavior: Behavior normal.         Thought Content: Thought content normal.         Judgment: Judgment normal.         Assessment & Plan   Problems " Addressed this Visit    None  Visit Diagnoses       History of CVA (cerebrovascular accident)    -  Primary    Relevant Medications    ketorolac (TORADOL) injection 60 mg (Start on 12/2/2024  2:16 PM)    History of recent hospitalization        Right ear pain        Right-sided carotid artery obstruction        Relevant Medications    ketorolac (TORADOL) injection 60 mg (Start on 12/2/2024  2:16 PM)    B12 deficiency        Relevant Medications    cyanocobalamin injection 1,000 mcg (Start on 12/2/2024  2:22 PM)    Recurrent acute serous otitis media of right ear              Diagnoses         Codes Comments    History of CVA (cerebrovascular accident)    -  Primary ICD-10-CM: Z86.73  ICD-9-CM: V12.54     History of recent hospitalization     ICD-10-CM: Z92.89  ICD-9-CM: V13.9     Right ear pain     ICD-10-CM: H92.01  ICD-9-CM: 388.70     Right-sided carotid artery obstruction     ICD-10-CM: I65.21  ICD-9-CM: 433.10     B12 deficiency     ICD-10-CM: E53.8  ICD-9-CM: 266.2     Recurrent acute serous otitis media of right ear     ICD-10-CM: H65.04  ICD-9-CM: 381.01         She has been encouraged to buy claritin D, allegra D, or zyrtec D.  Medications were discussed. Clonidine will be used only for high blood pressure of 160/90 or above.   She is to report to ER with any additional pain.  Continue plavix and crestor as ordered.

## 2024-12-03 ENCOUNTER — TELEPHONE (OUTPATIENT)
Dept: NEUROSURGERY | Age: 49
End: 2024-12-03

## 2024-12-03 DIAGNOSIS — I10 HYPERTENSION, UNSPECIFIED TYPE: ICD-10-CM

## 2024-12-03 DIAGNOSIS — I10 PRIMARY HYPERTENSION: Primary | ICD-10-CM

## 2024-12-03 RX ORDER — FUROSEMIDE 20 MG/1
TABLET ORAL
Qty: 90 TABLET | Refills: 1 | Status: SHIPPED | OUTPATIENT
Start: 2024-12-03

## 2024-12-03 RX ORDER — CILOSTAZOL 100 MG/1
100 TABLET ORAL 2 TIMES DAILY
Qty: 180 TABLET | Refills: 1 | Status: SHIPPED | OUTPATIENT
Start: 2024-12-03

## 2024-12-03 RX ORDER — CLONIDINE HYDROCHLORIDE 0.1 MG/1
0.1 TABLET ORAL 2 TIMES DAILY PRN
Qty: 90 TABLET | Refills: 2 | Status: SHIPPED | OUTPATIENT
Start: 2024-12-03

## 2024-12-03 NOTE — TELEPHONE ENCOUNTER
Nae called to schedule a 3 week  NP HFU    Please be advised that the best time to call her to accommodate their needs is Anytime.     Thank you.

## 2024-12-03 NOTE — TELEPHONE ENCOUNTER
Rx Refill Note  Requested Prescriptions     Pending Prescriptions Disp Refills    furosemide (LASIX) 20 MG tablet [Pharmacy Med Name: FUROSEMIDE 20MG TABS] 90 tablet 1     Sig: TAKE ONE TABLET BY MOUTH EVERY DAY    cilostazol (PLETAL) 100 MG tablet [Pharmacy Med Name: CILOSTAZOL 100MG TABS] 60 tablet 5     Sig: TAKE ONE TABLET BY MOUTH TWICE A DAY        Jesica Mccloud MA  12/03/24, 08:42 CST

## 2024-12-04 DIAGNOSIS — I63.9 CEREBROVASCULAR ACCIDENT DUE TO OCCLUSION (HCC): Primary | ICD-10-CM

## 2024-12-04 DIAGNOSIS — I10 PRIMARY HYPERTENSION: ICD-10-CM

## 2024-12-04 DIAGNOSIS — I63.9 CEREBROVASCULAR ACCIDENT (CVA), UNSPECIFIED MECHANISM (HCC): ICD-10-CM

## 2024-12-04 RX ORDER — CLONIDINE HYDROCHLORIDE 0.1 MG/1
0.1 TABLET ORAL 2 TIMES DAILY PRN
Qty: 90 TABLET | Refills: 2 | OUTPATIENT
Start: 2024-12-04

## 2024-12-04 NOTE — TELEPHONE ENCOUNTER
Caller: Maday Fung    Relationship: Self    Best call back number: [phone]     Requested Prescriptions:   Requested Prescriptions     Pending Prescriptions Disp Refills    cloNIDine (CATAPRES) 0.1 MG tablet 90 tablet 2     Sig: Take 1 tablet by mouth 2 (Two) Times a Day As Needed for High Blood Pressure.        Pharmacy where request should be sent: [facility] [address] [phone] [facility] [phone]      Last office visit with prescribing clinician: 12/2/2024   Last telemedicine visit with prescribing clinician: Visit date not found   Next office visit with prescribing clinician: 12/9/2024     Additional details provided by patient: PER PATIENT:  PATIENT IS SUPPOSED TO HAVE THIS MEDICATION ON HAND AT ALL TIMES DUE TO HER STROKES    AND SHE IS OUT      Does the patient have less than a 3 day supply:  [x] Yes  [] No    Would you like a call back once the refill request has been completed: [] Yes [x] No    If the office needs to give you a call back, can they leave a voicemail: [] Yes [x] No    Patrizia Asher Rep   12/04/24 07:52 CST

## 2024-12-04 NOTE — TELEPHONE ENCOUNTER
Spoke with patient about scheduling and she stated she feels she is going to choose [facility] to move forward with surgery since it can't be done here. I did let her know in the last hospital note MELIDA Su documented that she could follow up with our office in 3-4 weeks OR be referral to vascular neurosurgery. I told patient we could just go ahead and put in the referral to [facility]- Dr. Jones instead of her waiting for an appt with us and then sending the referral. Patient states that would be better. I let her know I would get that sent today. Patient thanked me and voiced understanding.     Placed the referral and faxed it to Dr. Jones's office at [phone].

## 2024-12-06 NOTE — TELEPHONE ENCOUNTER
[facility] did fax the referral back to our office and state patient's insurance is out of network. I attempted to call patient, no answer. Attempted to call patient's aunt. No answer, left detailed VM and stated I would speak with MELIDA Su on Monday and figure out where else we could possible send the referral.

## 2024-12-11 ENCOUNTER — OFFICE VISIT (OUTPATIENT)
Dept: FAMILY MEDICINE CLINIC | Facility: CLINIC | Age: 49
End: 2024-12-11
Payer: MEDICARE

## 2024-12-11 ENCOUNTER — TELEPHONE (OUTPATIENT)
Dept: NEUROSURGERY | Age: 49
End: 2024-12-11

## 2024-12-11 VITALS
RESPIRATION RATE: 21 BRPM | BODY MASS INDEX: 24.55 KG/M2 | OXYGEN SATURATION: 98 % | HEIGHT: 61 IN | DIASTOLIC BLOOD PRESSURE: 70 MMHG | WEIGHT: 130 LBS | HEART RATE: 91 BPM | SYSTOLIC BLOOD PRESSURE: 134 MMHG | TEMPERATURE: 98.7 F

## 2024-12-11 DIAGNOSIS — M54.2 NECK PAIN ON RIGHT SIDE: Primary | ICD-10-CM

## 2024-12-11 DIAGNOSIS — G44.1 OTHER VASCULAR HEADACHE: ICD-10-CM

## 2024-12-11 DIAGNOSIS — M25.511 ACUTE PAIN OF RIGHT SHOULDER: ICD-10-CM

## 2024-12-11 DIAGNOSIS — I63.9 CEREBROVASCULAR ACCIDENT (CVA), UNSPECIFIED MECHANISM (HCC): ICD-10-CM

## 2024-12-11 DIAGNOSIS — I10 PRIMARY HYPERTENSION: ICD-10-CM

## 2024-12-11 DIAGNOSIS — I63.9 CEREBROVASCULAR ACCIDENT DUE TO OCCLUSION (HCC): Primary | ICD-10-CM

## 2024-12-11 PROCEDURE — 99213 OFFICE O/P EST LOW 20 MIN: CPT | Performed by: NURSE PRACTITIONER

## 2024-12-11 PROCEDURE — 1125F AMNT PAIN NOTED PAIN PRSNT: CPT | Performed by: NURSE PRACTITIONER

## 2024-12-11 PROCEDURE — 1160F RVW MEDS BY RX/DR IN RCRD: CPT | Performed by: NURSE PRACTITIONER

## 2024-12-11 PROCEDURE — 1159F MED LIST DOCD IN RCRD: CPT | Performed by: NURSE PRACTITIONER

## 2024-12-11 RX ORDER — CYCLOBENZAPRINE HCL 10 MG
10 TABLET ORAL 3 TIMES DAILY PRN
Qty: 30 TABLET | Refills: 3 | Status: SHIPPED | OUTPATIENT
Start: 2024-12-11

## 2024-12-11 RX ORDER — CLONIDINE HYDROCHLORIDE 0.1 MG/1
0.1 TABLET ORAL 2 TIMES DAILY PRN
Qty: 90 TABLET | Refills: 2 | Status: SHIPPED | OUTPATIENT
Start: 2024-12-11

## 2024-12-11 RX ORDER — KETOROLAC TROMETHAMINE 30 MG/ML
60 INJECTION, SOLUTION INTRAMUSCULAR; INTRAVENOUS ONCE
Status: COMPLETED | OUTPATIENT
Start: 2024-12-11 | End: 2024-12-11

## 2024-12-11 RX ADMIN — KETOROLAC TROMETHAMINE 60 MG: 30 INJECTION, SOLUTION INTRAMUSCULAR; INTRAVENOUS at 11:35

## 2024-12-11 NOTE — TELEPHONE ENCOUNTER
See notes below from previous TE regarding the same issue, will call patient today and f/u to let her know we are still waiting on a facility that accepts her ins for this evaluation:    Anita Sorensen MA     AF    12/4/24 10:24 AM  Note     Spoke with patient about scheduling and she stated she feels she is going to choose [facility] to move forward with surgery since it can't be done here. I did let her know in the last hospital note MELIDA Su documented that she could follow up with our office in 3-4 weeks OR be referral to vascular neurosurgery. I told patient we could just go ahead and put in the referral to [facility]- Dr. Jones instead of her waiting for an appt with us and then sending the referral. Patient states that would be better. I let her know I would get that sent today. Patient thanked me and voiced understanding.      Placed the referral and faxed it to Dr. Jones's office at [phone].        December 6, 2024  Anita Sorensen MA     AF    12/6/24 11:34 AM  Note     [facility] did fax the referral back to our office and state patient's insurance is out of network. I attempted to call patient, no answer. Attempted to call patient's aunt. No answer, left detailed VM and stated I would speak with MELIDA Su on Monday and figure out where else we could possibly send the referral.

## 2024-12-11 NOTE — TELEPHONE ENCOUNTER
Attempted to call both the patient and her aunt again to explain what is going on with the referral. No answer from either of them, left VM on both of their phones stating Jaci is out of network with her insurance so it will have to go to [facility] or  to see a vascular neurosurgeon and to call me back at [phone].

## 2024-12-11 NOTE — TELEPHONE ENCOUNTER
Please call Nae back at [phone] to schedule a new referral to [facility] for   Cerebrovascular accident due to occlusion.    Thank you

## 2024-12-11 NOTE — TELEPHONE ENCOUNTER
Called [facility] and spoke with Chris. I asked if they accepted patient's insurance and they do. Faxed referral to [phone].

## 2024-12-11 NOTE — PROGRESS NOTES
"Chief Complaint   Patient presents with    Neck Pain    Headache        Subjective   Maday Fung is a 49 y.o. female who presents today for the following: Headache from neck and shoulder pain.    Neck Pain   Associated symptoms include headaches.   Headache     She is here today because she has severe neck, right shoulder and head are hurting. She is already on percocet and she is in pain that it does not touch. She does not know if it is from when she fell when she had a stroke last week or something else. X-rays at the hospital were normal.    Allergies   Allergen Reactions    Hydrocodone-Acetaminophen Hives     She breaks out in hives, rash and her throat feels funny     Cefaclor Swelling    Penicillins Hives    Sulfa Antibiotics Other (See Comments)         OBJECTIVE:  Vitals:    12/11/24 1056   BP: 134/70   BP Location: Left arm   Patient Position: Sitting   Cuff Size: Adult   Pulse: 91   Resp: 21   Temp: 98.7 °F (37.1 °C)   TempSrc: Oral   SpO2: 98%   Weight: 59 kg (130 lb)   Height: 154.9 cm (61\")     Physical Exam  HENT:      Head:      Comments: PERRLA  No photophobia.  Neck:     Musculoskeletal:      Right shoulder: Tenderness present. Decreased range of motion. Decreased strength.        Arms:       Cervical back: Rigidity present. Pain with movement and muscular tenderness present. Decreased range of motion.      Comments: Tenderness         BMI is within normal parameters. No other follow-up for BMI required.          ASSESSMENT/ PLAN:    Diagnoses and all orders for this visit:    1. Neck pain on right side (Primary)  -     cyclobenzaprine (FLEXERIL) 10 MG tablet; Take 1 tablet by mouth 3 (Three) Times a Day As Needed for Muscle Spasms.  Dispense: 30 tablet; Refill: 3  -     ketorolac (TORADOL) injection 60 mg    2. Acute pain of right shoulder  -     cyclobenzaprine (FLEXERIL) 10 MG tablet; Take 1 tablet by mouth 3 (Three) Times a Day As Needed for Muscle Spasms.  Dispense: 30 tablet; Refill: " 3  -     ketorolac (TORADOL) injection 60 mg    3. Other vascular headache  -     cyclobenzaprine (FLEXERIL) 10 MG tablet; Take 1 tablet by mouth 3 (Three) Times a Day As Needed for Muscle Spasms.  Dispense: 30 tablet; Refill: 3  -     ketorolac (TORADOL) injection 60 mg      Procedures     Management Plan:   Apply heat to neck and shoulder area.  An After Visit Summary was printed and given to the patient at discharge.    Follow-up: Return if symptoms worsen or fail to improve.         DEEPTHI Duncan 12/11/2024 11:17 CST  This note was electronically signed.

## 2024-12-16 ENCOUNTER — TELEPHONE (OUTPATIENT)
Dept: NEUROSURGERY | Age: 49
End: 2024-12-16

## 2024-12-16 NOTE — TELEPHONE ENCOUNTER
Patient calling office back she needs  to discuss her referral to [facility].  Please called patient.      Thank you

## 2024-12-17 NOTE — TELEPHONE ENCOUNTER
Left voicemail for patient to return my call regarding her referral to [facility]. After leaving message I called [facility] and was able to schedule patient an appointment for February 4, 2025 at 9:00 am with Troy Kimball. I have sent a my chart message to patient as well with all information.     Address to Troy Kimball  [address] [place] [address]. [phone]  # [phone]    Left my name Katja and call back number [phone] .

## 2024-12-18 ENCOUNTER — OFFICE VISIT (OUTPATIENT)
Dept: FAMILY MEDICINE CLINIC | Facility: CLINIC | Age: 49
End: 2024-12-18
Payer: MEDICARE

## 2024-12-18 VITALS
HEART RATE: 94 BPM | OXYGEN SATURATION: 99 % | TEMPERATURE: 98.1 F | HEIGHT: 61 IN | SYSTOLIC BLOOD PRESSURE: 165 MMHG | RESPIRATION RATE: 20 BRPM | BODY MASS INDEX: 24.28 KG/M2 | DIASTOLIC BLOOD PRESSURE: 93 MMHG | WEIGHT: 128.6 LBS

## 2024-12-18 DIAGNOSIS — I67.1 BRAIN ANEURYSM: Primary | ICD-10-CM

## 2024-12-18 DIAGNOSIS — Z86.14 HISTORY OF MRSA INFECTION: ICD-10-CM

## 2024-12-18 DIAGNOSIS — I10 PRIMARY HYPERTENSION: ICD-10-CM

## 2024-12-18 PROCEDURE — 1160F RVW MEDS BY RX/DR IN RCRD: CPT | Performed by: NURSE PRACTITIONER

## 2024-12-18 PROCEDURE — 99213 OFFICE O/P EST LOW 20 MIN: CPT | Performed by: NURSE PRACTITIONER

## 2024-12-18 PROCEDURE — 1159F MED LIST DOCD IN RCRD: CPT | Performed by: NURSE PRACTITIONER

## 2024-12-18 PROCEDURE — 1125F AMNT PAIN NOTED PAIN PRSNT: CPT | Performed by: NURSE PRACTITIONER

## 2024-12-18 RX ORDER — CLONIDINE HYDROCHLORIDE 0.1 MG/1
0.1 TABLET ORAL 2 TIMES DAILY PRN
Qty: 90 TABLET | Refills: 2 | Status: SHIPPED | OUTPATIENT
Start: 2024-12-18

## 2024-12-18 RX ORDER — KETOROLAC TROMETHAMINE 30 MG/ML
60 INJECTION, SOLUTION INTRAMUSCULAR; INTRAVENOUS ONCE
Status: COMPLETED | OUTPATIENT
Start: 2024-12-18 | End: 2024-12-18

## 2024-12-18 RX ADMIN — KETOROLAC TROMETHAMINE 60 MG: 30 INJECTION, SOLUTION INTRAMUSCULAR; INTRAVENOUS at 15:57

## 2024-12-18 NOTE — PROGRESS NOTES
"Chief Complaint   Patient presents with    Headache    discuss upcoming brain surgery      Pt has presurgical consultation at [facility] scheduled for 2/4/2025        Subjective   Maday Fung is a 49 y.o. female who presents today for the following: headaches and brain surgery.    HPI   She is going to have brain surgery and is scheduled 2/4/2024 for a consultation. She has had a  brain aneurysm and is going to get that repaired. She is requesting an antibiotic in case she gets MRSA.    Allergies   Allergen Reactions    Hydrocodone-Acetaminophen Hives     She breaks out in hives, rash and her throat feels funny     Cefaclor Swelling    Penicillins Hives    Sulfa Antibiotics Other (See Comments)         OBJECTIVE:  Vitals:    12/18/24 1516   BP: 165/93   BP Location: Left arm   Patient Position: Sitting   Cuff Size: Adult   Pulse: 94   Resp: 20   Temp: 98.1 °F (36.7 °C)   TempSrc: Infrared   SpO2: 99%   Weight: 58.3 kg (128 lb 9.6 oz)   Height: 154.9 cm (61\")     Physical Exam  Vitals and nursing note reviewed.   Constitutional:       Appearance: Normal appearance.   Cardiovascular:      Rate and Rhythm: Normal rate and regular rhythm.      Pulses: Normal pulses.      Heart sounds: Normal heart sounds.   Pulmonary:      Effort: Pulmonary effort is normal.      Breath sounds: Normal breath sounds.   Skin:     General: Skin is warm and dry.   Neurological:      General: No focal deficit present.      Mental Status: She is alert and oriented to person, place, and time.   Psychiatric:         Mood and Affect: Mood normal.         Behavior: Behavior normal.         Thought Content: Thought content normal.         Judgment: Judgment normal.         BMI is within normal parameters. No other follow-up for BMI required.          ASSESSMENT/ PLAN:    Diagnoses and all orders for this visit:    1. Brain aneurysm (Primary)    2. History of MRSA infection  -     amoxicillin-clavulanate (AUGMENTIN) 875-125 MG per tablet; Take 1 " tablet by mouth 2 (Two) Times a Day for 10 days.  Dispense: 20 tablet; Refill: 0  -     mupirocin (BACTROBAN) 2 % nasal ointment; Administer 1 Application into the nostril(s) as directed by provider 2 (Two) Times a Day.  Dispense: 15 g; Refill: 0  -     ketorolac (TORADOL) injection 60 mg    3. Primary hypertension  -     cloNIDine (CATAPRES) 0.1 MG tablet; Take 1 tablet by mouth 2 (Two) Times a Day As Needed for High Blood Pressure.  Dispense: 90 tablet; Refill: 2      Procedures     Management Plan:   Patient is to keep close watch on her blood pressure and take the clonidine when needed.   An After Visit Summary was printed and given to the patient at discharge.    Follow-up: Return if symptoms worsen or fail to improve.         Shanique Saeed, APRN 12/18/2024 15:43 CST  This note was electronically signed.

## 2024-12-19 ENCOUNTER — TELEPHONE (OUTPATIENT)
Dept: FAMILY MEDICINE CLINIC | Facility: CLINIC | Age: 49
End: 2024-12-19

## 2024-12-19 DIAGNOSIS — Z86.14 HISTORY OF MRSA INFECTION: Primary | ICD-10-CM

## 2024-12-19 RX ORDER — CIPROFLOXACIN 500 MG/1
500 TABLET, FILM COATED ORAL 2 TIMES DAILY
Qty: 20 TABLET | Refills: 0 | Status: SHIPPED | OUTPATIENT
Start: 2024-12-19

## 2024-12-19 NOTE — TELEPHONE ENCOUNTER
Caller: [facility] - [place], [place] - [address] - [phone]  - [phone]    Relationship: Pharmacy    Best call back number: [phone]     Which medication are you concerned about: amoxicillin-clavulanate (AUGMENTIN) 875-125 MG per tablet [93431] (Order [id number])     Who prescribed you this medication: SERA GARCIA        What are your concerns: PT IS ALLERGIC TO PENICILLIN AND NEEDS TO BE CHANGED. WOULD LIKE DONE ASAP PLEASE

## 2024-12-26 ENCOUNTER — OFFICE VISIT (OUTPATIENT)
Dept: FAMILY MEDICINE CLINIC | Facility: CLINIC | Age: 49
End: 2024-12-26
Payer: MEDICARE

## 2024-12-26 VITALS
RESPIRATION RATE: 18 BRPM | SYSTOLIC BLOOD PRESSURE: 128 MMHG | BODY MASS INDEX: 23.22 KG/M2 | HEIGHT: 61 IN | OXYGEN SATURATION: 98 % | DIASTOLIC BLOOD PRESSURE: 64 MMHG | WEIGHT: 123 LBS | TEMPERATURE: 97.8 F | HEART RATE: 101 BPM

## 2024-12-26 DIAGNOSIS — G44.1 OTHER VASCULAR HEADACHE: Primary | ICD-10-CM

## 2024-12-26 PROCEDURE — 99213 OFFICE O/P EST LOW 20 MIN: CPT | Performed by: NURSE PRACTITIONER

## 2024-12-26 PROCEDURE — 1159F MED LIST DOCD IN RCRD: CPT | Performed by: NURSE PRACTITIONER

## 2024-12-26 PROCEDURE — 1125F AMNT PAIN NOTED PAIN PRSNT: CPT | Performed by: NURSE PRACTITIONER

## 2024-12-26 PROCEDURE — 1160F RVW MEDS BY RX/DR IN RCRD: CPT | Performed by: NURSE PRACTITIONER

## 2024-12-26 RX ORDER — KETOROLAC TROMETHAMINE 30 MG/ML
60 INJECTION, SOLUTION INTRAMUSCULAR; INTRAVENOUS ONCE
Status: COMPLETED | OUTPATIENT
Start: 2024-12-26 | End: 2024-12-26

## 2024-12-26 RX ADMIN — KETOROLAC TROMETHAMINE 60 MG: 30 INJECTION, SOLUTION INTRAMUSCULAR; INTRAVENOUS at 13:47

## 2024-12-26 NOTE — PROGRESS NOTES
"Chief Complaint   Patient presents with    Headache        Subjective   Maday Fung is a 49 y.o. female who presents today for the following: headaches.    Headache     She has tried OTC headache medications and none of them have helped. She has another month until she has brain surgery.     Allergies   Allergen Reactions    Hydrocodone-Acetaminophen Hives     She breaks out in hives, rash and her throat feels funny     Cefaclor Swelling    Penicillins Hives    Sulfa Antibiotics Other (See Comments)         OBJECTIVE:  Vitals:    12/26/24 1326   BP: 128/64   BP Location: Left arm   Patient Position: Sitting   Cuff Size: Adult   Pulse: 101   Resp: 18   Temp: 97.8 °F (36.6 °C)   TempSrc: Infrared   SpO2: 98%   Weight: 55.8 kg (123 lb)   Height: 154.9 cm (61\")     Physical Exam  Vitals and nursing note reviewed.   Constitutional:       Appearance: Normal appearance.   Cardiovascular:      Rate and Rhythm: Normal rate and regular rhythm.      Pulses: Normal pulses.      Heart sounds: Normal heart sounds.   Pulmonary:      Effort: Pulmonary effort is normal.      Breath sounds: Normal breath sounds.   Skin:     General: Skin is warm and dry.   Neurological:      General: No focal deficit present.      Mental Status: She is alert and oriented to person, place, and time.      Comments: Photophobia  PERRLA   Psychiatric:         Mood and Affect: Mood normal.         Behavior: Behavior normal.         Thought Content: Thought content normal.         Judgment: Judgment normal.         BMI is within normal parameters. No other follow-up for BMI required.          ASSESSMENT/ PLAN:    Diagnoses and all orders for this visit:    1. Other vascular headache (Primary)  -     ketorolac (TORADOL) injection 60 mg      Procedures     Management Plan:   Continue present management.  An After Visit Summary was printed and given to the patient at discharge.    Follow-up: Return if symptoms worsen or fail to improve.         Shanique ERIC" DEEPTHI Saeed 12/26/2024 13:34 CST  This note was electronically signed.

## 2024-12-27 ENCOUNTER — CLINICAL SUPPORT (OUTPATIENT)
Dept: FAMILY MEDICINE CLINIC | Facility: CLINIC | Age: 49
End: 2024-12-27
Payer: MEDICARE

## 2024-12-27 DIAGNOSIS — G44.1 OTHER VASCULAR HEADACHE: Primary | ICD-10-CM

## 2024-12-27 PROCEDURE — 96372 THER/PROPH/DIAG INJ SC/IM: CPT | Performed by: NURSE PRACTITIONER

## 2024-12-27 RX ORDER — FLUTICASONE PROPIONATE AND SALMETEROL XINAFOATE 115; 21 UG/1; UG/1
AEROSOL, METERED RESPIRATORY (INHALATION)
Qty: 12 G | Refills: 2 | Status: SHIPPED | OUTPATIENT
Start: 2024-12-27

## 2024-12-27 RX ORDER — KETOROLAC TROMETHAMINE 30 MG/ML
30 INJECTION, SOLUTION INTRAMUSCULAR; INTRAVENOUS ONCE
Status: COMPLETED | OUTPATIENT
Start: 2024-12-27 | End: 2024-12-27

## 2024-12-27 RX ORDER — KETOROLAC TROMETHAMINE 10 MG/1
10 TABLET, FILM COATED ORAL EVERY 6 HOURS PRN
Qty: 40 TABLET | Refills: 0 | Status: SHIPPED | OUTPATIENT
Start: 2024-12-27

## 2024-12-27 RX ADMIN — KETOROLAC TROMETHAMINE 30 MG: 30 INJECTION, SOLUTION INTRAMUSCULAR; INTRAVENOUS at 14:27

## 2024-12-27 NOTE — PROGRESS NOTES
Injection  Toradol 30 mg Injection performed in L ventrogluteal by Erika Boateng MA. Patient tolerated the procedure well without complications.  12/27/24   Erika Boateng MA

## 2025-01-01 ENCOUNTER — PRIOR AUTHORIZATION (OUTPATIENT)
Dept: FAMILY MEDICINE CLINIC | Facility: CLINIC | Age: 50
End: 2025-01-01
Payer: MEDICARE

## 2025-01-02 ENCOUNTER — OFFICE VISIT (OUTPATIENT)
Dept: FAMILY MEDICINE CLINIC | Facility: CLINIC | Age: 50
End: 2025-01-02
Payer: MEDICARE

## 2025-01-02 VITALS
RESPIRATION RATE: 16 BRPM | OXYGEN SATURATION: 97 % | SYSTOLIC BLOOD PRESSURE: 128 MMHG | BODY MASS INDEX: 22.84 KG/M2 | TEMPERATURE: 97.6 F | DIASTOLIC BLOOD PRESSURE: 70 MMHG | WEIGHT: 121 LBS | HEIGHT: 61 IN | HEART RATE: 94 BPM

## 2025-01-02 DIAGNOSIS — M25.511 ACUTE PAIN OF RIGHT SHOULDER: ICD-10-CM

## 2025-01-02 DIAGNOSIS — R51.9 ACUTE INTRACTABLE HEADACHE, UNSPECIFIED HEADACHE TYPE: Primary | ICD-10-CM

## 2025-01-02 DIAGNOSIS — M54.2 NECK PAIN ON RIGHT SIDE: ICD-10-CM

## 2025-01-02 DIAGNOSIS — F51.01 PRIMARY INSOMNIA: ICD-10-CM

## 2025-01-02 DIAGNOSIS — G44.1 OTHER VASCULAR HEADACHE: ICD-10-CM

## 2025-01-02 PROCEDURE — 99213 OFFICE O/P EST LOW 20 MIN: CPT | Performed by: NURSE PRACTITIONER

## 2025-01-02 PROCEDURE — 1159F MED LIST DOCD IN RCRD: CPT | Performed by: NURSE PRACTITIONER

## 2025-01-02 PROCEDURE — 1125F AMNT PAIN NOTED PAIN PRSNT: CPT | Performed by: NURSE PRACTITIONER

## 2025-01-02 PROCEDURE — 1160F RVW MEDS BY RX/DR IN RCRD: CPT | Performed by: NURSE PRACTITIONER

## 2025-01-02 RX ORDER — DOXEPIN HYDROCHLORIDE 100 MG/1
100 CAPSULE ORAL EVERY EVENING
Qty: 30 CAPSULE | Refills: 2 | Status: SHIPPED | OUTPATIENT
Start: 2025-01-02

## 2025-01-02 RX ORDER — CYCLOBENZAPRINE HCL 10 MG
10 TABLET ORAL 3 TIMES DAILY PRN
Qty: 30 TABLET | Refills: 3 | Status: SHIPPED | OUTPATIENT
Start: 2025-01-02

## 2025-01-02 NOTE — TELEPHONE ENCOUNTER
Rx Refill Note  Requested Prescriptions     Pending Prescriptions Disp Refills    cyclobenzaprine (FLEXERIL) 10 MG tablet [Pharmacy Med Name: CYCLOBENZAPRINE HCL 10MG TABS] 30 tablet 3     Sig: TAKE ONE TABLET BY MOUTH THREE TIMES A DAY AS NEEDED FOR MUSCLE SPASMS    doxepin (SINEquan) 100 MG capsule [Pharmacy Med Name: DOXEPIN HCL 100MG CAPS] 30 capsule 2     Sig: TAKE ONE CAPSULE BY MOUTH EVERY EVENING          Jesica Mccloud MA  01/02/25, 09:14 CST

## 2025-01-02 NOTE — PROGRESS NOTES
"Chief Complaint   Patient presents with    Headache     Pt is having headaches despite Toradol injections and starting Toradol tablets        Subjective   Maday Fung is a 49 y.o. female who presents today for the following:    HPI   Patient states she had a stroke. She states she is scheduled for surgery next month in [place]. Patient states she has right sided neck pain that radiates to the back of the head. Patient has had 4 toradol injections in the past month and is also taking oral Toradol. She has had 2 doses today. Patient has also tried ibuprofen 800mg without any relief. Patient is requesting another toradol injection today.       Allergies   Allergen Reactions    Hydrocodone-Acetaminophen Hives     She breaks out in hives, rash and her throat feels funny     Cefaclor Swelling    Penicillins Hives    Sulfa Antibiotics Other (See Comments)         OBJECTIVE:  Vitals:    01/02/25 1329   BP: 128/70   BP Location: Left arm   Patient Position: Sitting   Cuff Size: Adult   Pulse: 94   Resp: 16   Temp: 97.6 °F (36.4 °C)   TempSrc: Infrared   SpO2: 97%   Weight: 54.9 kg (121 lb)   Height: 154.9 cm (61\")     Physical Exam  Vitals and nursing note reviewed.   Constitutional:       Appearance: Normal appearance.   HENT:      Nose: Nose normal.   Eyes:      Pupils: Pupils are equal, round, and reactive to light.   Neck:        Comments: Patient complains of pain in marked area. No enlarged lymph nodes noted.   Cardiovascular:      Rate and Rhythm: Normal rate and regular rhythm.   Pulmonary:      Breath sounds: Normal breath sounds. No wheezing or rhonchi.   Musculoskeletal:         General: Normal range of motion.   Skin:     General: Skin is warm and dry.   Neurological:      Mental Status: She is alert and oriented to person, place, and time.   Psychiatric:         Mood and Affect: Mood normal.         Behavior: Behavior normal.         BMI is within normal parameters. No other follow-up for BMI " required.          ASSESSMENT/ PLAN:  Assessment & Plan  Acute intractable headache, unspecified headache type  I wanted to check labs on patient today. Patient declined. Ubrelvy samples given to patient today. If symptoms become worse advised patient to go to ER  Orders:    ubrogepant (Ubrelvy) 50 MG tablet; Take 1 tablet by mouth 1 (One) Time for 1 dose.    ubrogepant (Ubrelvy) 100 MG tablet; Take 1 tablet by mouth 1 (One) Time for 1 dose.         Procedures     Management Plan:     An After Visit Summary was printed and given to the patient at discharge.    Follow-up: Return if symptoms worsen or fail to improve.         Loida Aguilera, APRN 1/2/2025 14:16 CST  This note was electronically signed.

## 2025-01-08 DIAGNOSIS — R51.9 ACUTE INTRACTABLE HEADACHE, UNSPECIFIED HEADACHE TYPE: ICD-10-CM

## 2025-01-08 RX ORDER — SUMATRIPTAN SUCCINATE 25 MG/1
TABLET ORAL
Qty: 9 TABLET | Refills: 0 | Status: SHIPPED | OUTPATIENT
Start: 2025-01-08

## 2025-01-13 DIAGNOSIS — M54.2 NECK PAIN ON RIGHT SIDE: ICD-10-CM

## 2025-01-13 DIAGNOSIS — G44.1 OTHER VASCULAR HEADACHE: ICD-10-CM

## 2025-01-13 DIAGNOSIS — F51.01 PRIMARY INSOMNIA: ICD-10-CM

## 2025-01-13 DIAGNOSIS — M25.511 ACUTE PAIN OF RIGHT SHOULDER: ICD-10-CM

## 2025-01-14 DIAGNOSIS — R51.9 ACUTE INTRACTABLE HEADACHE, UNSPECIFIED HEADACHE TYPE: ICD-10-CM

## 2025-01-14 RX ORDER — CYCLOBENZAPRINE HCL 10 MG
10 TABLET ORAL 3 TIMES DAILY PRN
Qty: 30 TABLET | Refills: 3 | OUTPATIENT
Start: 2025-01-14

## 2025-01-14 RX ORDER — SUMATRIPTAN SUCCINATE 25 MG/1
TABLET ORAL
Qty: 9 TABLET | Refills: 0 | Status: SHIPPED | OUTPATIENT
Start: 2025-01-14

## 2025-01-14 RX ORDER — DOXEPIN HYDROCHLORIDE 100 MG/1
100 CAPSULE ORAL EVERY EVENING
Qty: 30 CAPSULE | Refills: 2 | OUTPATIENT
Start: 2025-01-14

## 2025-01-22 ENCOUNTER — TELEPHONE (OUTPATIENT)
Dept: FAMILY MEDICINE CLINIC | Facility: CLINIC | Age: 50
End: 2025-01-22
Payer: MEDICARE

## 2025-01-22 ENCOUNTER — TELEPHONE (OUTPATIENT)
Dept: VASCULAR SURGERY | Age: 50
End: 2025-01-22

## 2025-01-22 NOTE — TELEPHONE ENCOUNTER
Pt called to ask if she could have another shot for a headache, last injection was 01/14/2025. Please advise. Thank you.

## 2025-01-22 NOTE — TELEPHONE ENCOUNTER
The patient called into the office today asking [facility] for pain medicine for migraines until she can get to [place] for \"brain surgery\".  I let her know that she will need to go to her PCP for this type of issue as we do not treat migraines.  She stated that she has already been to them and they could not provide her anything that would not cause a stroke.  I let her know she would need to contact them again.  She voiced understanding.

## 2025-01-23 ENCOUNTER — TELEPHONE (OUTPATIENT)
Dept: VASCULAR SURGERY | Age: 50
End: 2025-01-23

## 2025-01-23 NOTE — TELEPHONE ENCOUNTER
Called and left a message for the patient to let her know that we are unable to provider her with any treatment for her headaches.

## 2025-01-23 NOTE — TELEPHONE ENCOUNTER
Nae requests a call back from the provider please, she advised that it is important, no other details were given.    Thank you.

## 2025-01-27 ENCOUNTER — TELEPHONE (OUTPATIENT)
Dept: NEUROLOGY | Age: 50
End: 2025-01-27

## 2025-01-27 ENCOUNTER — TELEPHONE (OUTPATIENT)
Dept: FAMILY MEDICINE CLINIC | Facility: CLINIC | Age: 50
End: 2025-01-27
Payer: MEDICARE

## 2025-01-27 DIAGNOSIS — F51.01 PRIMARY INSOMNIA: ICD-10-CM

## 2025-01-27 DIAGNOSIS — R51.9 FREQUENT HEADACHES: Primary | ICD-10-CM

## 2025-01-27 RX ORDER — DOXEPIN HYDROCHLORIDE 100 MG/1
100 CAPSULE ORAL EVERY EVENING
Qty: 30 CAPSULE | Refills: 2 | Status: SHIPPED | OUTPATIENT
Start: 2025-01-27

## 2025-01-27 RX ORDER — DOXEPIN HYDROCHLORIDE 100 MG/1
100 CAPSULE ORAL EVERY EVENING
Qty: 30 CAPSULE | Refills: 2 | OUTPATIENT
Start: 2025-01-27

## 2025-01-27 NOTE — TELEPHONE ENCOUNTER
Rx Refill Note  Requested Prescriptions     Pending Prescriptions Disp Refills    doxepin (SINEquan) 100 MG capsule [Pharmacy Med Name: DOXEPIN HCL 100MG CAPS] 30 capsule 2     Sig: TAKE ONE CAPSULE BY MOUTH EVERY EVENING        Jesica Mccloud MA  01/27/25, 13:54 CST

## 2025-01-27 NOTE — TELEPHONE ENCOUNTER
Patient states that she is having daily painful headaches. She states the headache she has now started yesterday and she is in massive pain. She does complain of light sensitivity and dizziness.Please advise.      Patient states her surgery in [place] isn't scheduled until 2/4    Patient uses [facility]

## 2025-01-27 NOTE — TELEPHONE ENCOUNTER
[facility]in is asking for a new script for Doxepin 100mg sent over. They had to partial a month to get her meds lined up for  and now it's time to refill. They are asking for this ASAP as she is in the pharmacy picking up her meds now. Thanks.

## 2025-01-27 NOTE — TELEPHONE ENCOUNTER
Left vm for pt requesting a call back to see if she would like to give Fioricet a try since we do not typically prescribe anything for the headaches and Joan and Dr. Beauchamp do not prescribe opioates.

## 2025-01-27 NOTE — TELEPHONE ENCOUNTER
I don't think there is much to take for the headache.  We do not prescribe opiates for headaches but we can try Fioricet?

## 2025-01-28 RX ORDER — BUTALBITAL, ACETAMINOPHEN AND CAFFEINE 300; 40; 50 MG/1; MG/1; MG/1
1 CAPSULE ORAL 2 TIMES DAILY PRN
Qty: 60 CAPSULE | Refills: 0 | Status: SHIPPED | OUTPATIENT
Start: 2025-01-28 | End: 2025-02-27

## 2025-02-10 ENCOUNTER — OFFICE VISIT (OUTPATIENT)
Dept: FAMILY MEDICINE CLINIC | Facility: CLINIC | Age: 50
End: 2025-02-10
Payer: MEDICARE

## 2025-02-10 VITALS
DIASTOLIC BLOOD PRESSURE: 83 MMHG | WEIGHT: 126 LBS | OXYGEN SATURATION: 98 % | RESPIRATION RATE: 20 BRPM | SYSTOLIC BLOOD PRESSURE: 135 MMHG | HEIGHT: 61 IN | TEMPERATURE: 98.2 F | HEART RATE: 88 BPM | BODY MASS INDEX: 23.79 KG/M2

## 2025-02-10 DIAGNOSIS — G44.1 OTHER VASCULAR HEADACHE: Primary | ICD-10-CM

## 2025-02-10 DIAGNOSIS — M54.2 NECK PAIN ON RIGHT SIDE: ICD-10-CM

## 2025-02-10 DIAGNOSIS — M25.511 ACUTE PAIN OF RIGHT SHOULDER: ICD-10-CM

## 2025-02-10 DIAGNOSIS — R51.9 ACUTE INTRACTABLE HEADACHE, UNSPECIFIED HEADACHE TYPE: ICD-10-CM

## 2025-02-10 PROCEDURE — 99213 OFFICE O/P EST LOW 20 MIN: CPT | Performed by: NURSE PRACTITIONER

## 2025-02-10 PROCEDURE — 1159F MED LIST DOCD IN RCRD: CPT | Performed by: NURSE PRACTITIONER

## 2025-02-10 PROCEDURE — 1125F AMNT PAIN NOTED PAIN PRSNT: CPT | Performed by: NURSE PRACTITIONER

## 2025-02-10 PROCEDURE — 1160F RVW MEDS BY RX/DR IN RCRD: CPT | Performed by: NURSE PRACTITIONER

## 2025-02-10 PROCEDURE — 96372 THER/PROPH/DIAG INJ SC/IM: CPT | Performed by: NURSE PRACTITIONER

## 2025-02-10 RX ORDER — VALACYCLOVIR HYDROCHLORIDE 1 G/1
TABLET, FILM COATED ORAL
COMMUNITY

## 2025-02-10 RX ORDER — CLINDAMYCIN HYDROCHLORIDE 300 MG/1
300 CAPSULE ORAL 3 TIMES DAILY PRN
COMMUNITY

## 2025-02-10 RX ORDER — ASPIRIN 81 MG/1
81 TABLET ORAL DAILY
COMMUNITY

## 2025-02-10 RX ORDER — BUTALBITAL, ACETAMINOPHEN AND CAFFEINE 300; 40; 50 MG/1; MG/1; MG/1
1 CAPSULE ORAL 2 TIMES DAILY
COMMUNITY
Start: 2025-01-28

## 2025-02-10 RX ORDER — KETOROLAC TROMETHAMINE 30 MG/ML
60 INJECTION, SOLUTION INTRAMUSCULAR; INTRAVENOUS ONCE
Status: COMPLETED | OUTPATIENT
Start: 2025-02-10 | End: 2025-02-10

## 2025-02-10 RX ORDER — DOXYCYCLINE HYCLATE 100 MG
100 TABLET ORAL TAKE AS DIRECTED
COMMUNITY

## 2025-02-10 RX ORDER — DIAZEPAM 10 MG/1
5 TABLET ORAL ONCE AS NEEDED
COMMUNITY

## 2025-02-10 RX ORDER — LACTULOSE 10 G/15ML
10 SOLUTION ORAL DAILY PRN
COMMUNITY

## 2025-02-10 RX ORDER — POTASSIUM CHLORIDE 1500 MG/1
20 TABLET, EXTENDED RELEASE ORAL DAILY
COMMUNITY

## 2025-02-10 RX ORDER — ASPIRIN 325 MG
325 TABLET, DELAYED RELEASE (ENTERIC COATED) ORAL DAILY
COMMUNITY
Start: 2025-02-04 | End: 2025-03-06

## 2025-02-10 RX ORDER — IBUPROFEN 600 MG/1
600 TABLET, FILM COATED ORAL DAILY PRN
COMMUNITY

## 2025-02-10 RX ADMIN — KETOROLAC TROMETHAMINE 60 MG: 30 INJECTION, SOLUTION INTRAMUSCULAR; INTRAVENOUS at 13:43

## 2025-02-10 NOTE — PROGRESS NOTES
"Chief Complaint   Patient presents with    Headache     Requesting Toradol injection        Subjective   Maday Fung is a 49 y.o. female who presents today for the following: headaches.    Headache     She presents with headaches prior to brain surgery. She has checked with neuro about toradol injections and has gotten clearance for the injection.     Allergies   Allergen Reactions    Hydrocodone-Acetaminophen Hives     She breaks out in hives, rash and her throat feels funny     Cefaclor Swelling    Hydrocodone Hives and Itching     Can't take this gets sick    Penicillins Hives    Sulfa Antibiotics Other (See Comments)    Sulfate GI Intolerance and Nausea And Vomiting     All sulfa medication         OBJECTIVE:  Vitals:    02/10/25 1307   BP: 135/83   BP Location: Left arm   Patient Position: Sitting   Cuff Size: Adult   Pulse: 88   Resp: 20   Temp: 98.2 °F (36.8 °C)   TempSrc: Infrared   SpO2: 98%   Weight: 57.2 kg (126 lb)   Height: 154.9 cm (61\")     Physical Exam  Vitals and nursing note reviewed.   Constitutional:       Appearance: Normal appearance.   Cardiovascular:      Rate and Rhythm: Normal rate and regular rhythm.      Pulses: Normal pulses.      Heart sounds: Normal heart sounds.   Pulmonary:      Effort: Pulmonary effort is normal.      Breath sounds: Normal breath sounds.   Skin:     General: Skin is warm and dry.   Neurological:      General: No focal deficit present.      Mental Status: She is alert and oriented to person, place, and time.   Psychiatric:         Mood and Affect: Mood normal.         Behavior: Behavior normal.         Thought Content: Thought content normal.         Judgment: Judgment normal.         BMI is within normal parameters. No other follow-up for BMI required.          ASSESSMENT/ PLAN:    Diagnoses and all orders for this visit:    1. Other vascular headache (Primary)  -     ketorolac (TORADOL) injection 60 mg  -     Ambulatory Referral to Physical Therapy for " Evaluation & Treatment    2. Acute intractable headache, unspecified headache type  -     ketorolac (TORADOL) injection 60 mg  -     Ambulatory Referral to Physical Therapy for Evaluation & Treatment    3. Neck pain on right side  -     ketorolac (TORADOL) injection 60 mg  -     Ambulatory Referral to Physical Therapy for Evaluation & Treatment    4. Acute pain of right shoulder  -     ketorolac (TORADOL) injection 60 mg  -     Ambulatory Referral to Physical Therapy for Evaluation & Treatment      Procedures     Management Plan:     An After Visit Summary was printed and given to the patient at discharge.    Follow-up: Return if symptoms worsen or fail to improve.         Shanique Saeed, APRN 2/10/2025 13:31 CST  This note was electronically signed.

## 2025-02-17 ENCOUNTER — CLINICAL SUPPORT (OUTPATIENT)
Dept: FAMILY MEDICINE CLINIC | Facility: CLINIC | Age: 50
End: 2025-02-17
Payer: MEDICARE

## 2025-02-17 DIAGNOSIS — R51.9 CHRONIC NONINTRACTABLE HEADACHE, UNSPECIFIED HEADACHE TYPE: Primary | ICD-10-CM

## 2025-02-17 DIAGNOSIS — G89.29 CHRONIC NONINTRACTABLE HEADACHE, UNSPECIFIED HEADACHE TYPE: Primary | ICD-10-CM

## 2025-02-17 DIAGNOSIS — R51.9 ACUTE INTRACTABLE HEADACHE, UNSPECIFIED HEADACHE TYPE: Primary | ICD-10-CM

## 2025-02-17 PROCEDURE — 96372 THER/PROPH/DIAG INJ SC/IM: CPT | Performed by: NURSE PRACTITIONER

## 2025-02-17 RX ORDER — KETOROLAC TROMETHAMINE 30 MG/ML
60 INJECTION, SOLUTION INTRAMUSCULAR; INTRAVENOUS ONCE
Status: COMPLETED | OUTPATIENT
Start: 2025-02-17 | End: 2025-02-17

## 2025-02-17 RX ADMIN — KETOROLAC TROMETHAMINE 60 MG: 30 INJECTION, SOLUTION INTRAMUSCULAR; INTRAVENOUS at 14:50

## 2025-02-17 NOTE — PROGRESS NOTES
Injection  Toradol Injection 60 mg performed in L ventrogluteal, IM by Erika Boateng MA. Patient tolerated the procedure well without complications.  02/17/25   Erika Boateng MA

## 2025-02-21 DIAGNOSIS — E78.2 MIXED HYPERLIPIDEMIA: ICD-10-CM

## 2025-02-21 DIAGNOSIS — F41.1 GAD (GENERALIZED ANXIETY DISORDER): ICD-10-CM

## 2025-02-21 RX ORDER — ROSUVASTATIN CALCIUM 10 MG/1
10 TABLET, COATED ORAL DAILY
Qty: 90 TABLET | Refills: 1 | Status: SHIPPED | OUTPATIENT
Start: 2025-02-21

## 2025-02-21 NOTE — TELEPHONE ENCOUNTER
Rx Refill Note  Requested Prescriptions     Pending Prescriptions Disp Refills    FLUoxetine (PROzac) 20 MG capsule [Pharmacy Med Name: FLUOXETINE HCL 20MG CAPS] 180 capsule 1     Sig: TAKE TWO CAPSULES BY MOUTH EVERY DAY    rosuvastatin (CRESTOR) 10 MG tablet [Pharmacy Med Name: ROSUVASTATIN CALCIUM 10MG TABS] 90 tablet 1     Sig: TAKE ONE TABLET BY MOUTH EVERY DAY       Jesica Mccloud  02/21/25, 08:02 CST

## 2025-03-07 ENCOUNTER — CLINICAL SUPPORT (OUTPATIENT)
Dept: FAMILY MEDICINE CLINIC | Facility: CLINIC | Age: 50
End: 2025-03-07
Payer: MEDICARE

## 2025-03-07 DIAGNOSIS — G89.29 CHRONIC NONINTRACTABLE HEADACHE, UNSPECIFIED HEADACHE TYPE: Primary | ICD-10-CM

## 2025-03-07 DIAGNOSIS — R51.9 CHRONIC NONINTRACTABLE HEADACHE, UNSPECIFIED HEADACHE TYPE: Primary | ICD-10-CM

## 2025-03-07 PROCEDURE — 96372 THER/PROPH/DIAG INJ SC/IM: CPT | Performed by: NURSE PRACTITIONER

## 2025-03-07 RX ORDER — KETOROLAC TROMETHAMINE 30 MG/ML
60 INJECTION, SOLUTION INTRAMUSCULAR; INTRAVENOUS ONCE
Status: COMPLETED | OUTPATIENT
Start: 2025-03-07 | End: 2025-03-07

## 2025-03-07 RX ADMIN — CYANOCOBALAMIN 1000 MCG: 1000 INJECTION, SOLUTION INTRAMUSCULAR; SUBCUTANEOUS at 14:02

## 2025-03-07 RX ADMIN — KETOROLAC TROMETHAMINE 60 MG: 30 INJECTION, SOLUTION INTRAMUSCULAR; INTRAVENOUS at 14:00

## 2025-03-07 NOTE — PROGRESS NOTES
Injection  Pt received Toradol and B12 Injections as per DEEPTHI Crow,  performed in R ventrogluteal  by Erika Boateng MA. Patient tolerated the procedure well without complications.  03/07/25   Erika Boateng MA

## 2025-03-24 RX ORDER — FLUTICASONE PROPIONATE AND SALMETEROL XINAFOATE 115; 21 UG/1; UG/1
2 AEROSOL, METERED RESPIRATORY (INHALATION) 2 TIMES DAILY
Qty: 12 G | Refills: 2 | Status: SHIPPED | OUTPATIENT
Start: 2025-03-24

## 2025-04-22 ENCOUNTER — TELEPHONE (OUTPATIENT)
Dept: FAMILY MEDICINE CLINIC | Facility: CLINIC | Age: 50
End: 2025-04-22
Payer: MEDICARE

## 2025-04-22 RX ORDER — CLOPIDOGREL BISULFATE 75 MG/1
75 TABLET ORAL DAILY
Qty: 30 TABLET | Refills: 3 | Status: SHIPPED | OUTPATIENT
Start: 2025-04-22

## 2025-04-24 DIAGNOSIS — F51.01 PRIMARY INSOMNIA: ICD-10-CM

## 2025-04-24 RX ORDER — DOXEPIN HYDROCHLORIDE 100 MG/1
CAPSULE ORAL
Qty: 30 CAPSULE | Refills: 2 | Status: SHIPPED | OUTPATIENT
Start: 2025-04-24

## 2025-04-27 DIAGNOSIS — I10 PRIMARY HYPERTENSION: ICD-10-CM

## 2025-04-28 ENCOUNTER — TELEPHONE (OUTPATIENT)
Dept: FAMILY MEDICINE CLINIC | Facility: CLINIC | Age: 50
End: 2025-04-28
Payer: MEDICARE

## 2025-04-28 RX ORDER — CLONIDINE HYDROCHLORIDE 0.1 MG/1
TABLET ORAL
Qty: 90 TABLET | Refills: 2 | Status: SHIPPED | OUTPATIENT
Start: 2025-04-28

## 2025-04-28 NOTE — TELEPHONE ENCOUNTER
Patient says she has a cough and wanted to see if you could call in cough meds- I let her know she would need an appt but she says she is too busy for an appt right now. She still wanted me to check to see if you would send something in. Please advise.

## 2025-05-23 DIAGNOSIS — I10 HYPERTENSION, UNSPECIFIED TYPE: ICD-10-CM

## 2025-05-23 RX ORDER — CILOSTAZOL 100 MG/1
100 TABLET ORAL 2 TIMES DAILY
Qty: 180 TABLET | Refills: 1 | Status: SHIPPED | OUTPATIENT
Start: 2025-05-23

## 2025-05-23 RX ORDER — FUROSEMIDE 20 MG/1
20 TABLET ORAL DAILY
Qty: 90 TABLET | Refills: 1 | Status: SHIPPED | OUTPATIENT
Start: 2025-05-23

## 2025-05-24 DIAGNOSIS — F51.01 PRIMARY INSOMNIA: ICD-10-CM

## 2025-05-27 RX ORDER — DOXEPIN HYDROCHLORIDE 100 MG/1
CAPSULE ORAL
Qty: 30 CAPSULE | Refills: 2 | Status: SHIPPED | OUTPATIENT
Start: 2025-05-27

## 2025-05-27 RX ORDER — FLUTICASONE PROPIONATE AND SALMETEROL XINAFOATE 115; 21 UG/1; UG/1
2 AEROSOL, METERED RESPIRATORY (INHALATION) 2 TIMES DAILY
Qty: 12 G | Refills: 2 | Status: SHIPPED | OUTPATIENT
Start: 2025-05-27 | End: 2025-06-02 | Stop reason: SDUPTHER

## 2025-06-02 DIAGNOSIS — J45.41 MODERATE PERSISTENT ASTHMA WITH ACUTE EXACERBATION: Primary | ICD-10-CM

## 2025-06-02 RX ORDER — FLUTICASONE PROPIONATE AND SALMETEROL XINAFOATE 115; 21 UG/1; UG/1
2 AEROSOL, METERED RESPIRATORY (INHALATION) 2 TIMES DAILY
Qty: 12 G | Refills: 2 | Status: SHIPPED | OUTPATIENT
Start: 2025-06-02

## (undated) DEVICE — Device: Brand: DEFENDO AIR/WATER/SUCTION AND BIOPSY VALVE

## (undated) DEVICE — YANKAUER,BULB TIP WITH VENT: Brand: ARGYLE

## (undated) DEVICE — THE CHANNEL CLEANING BRUSH IS A NYLON FLEXI BRUSH ATTACHED TO A FLEXIBLE PLASTIC SHEATH DESIGNED TO SAFELY REMOVE DEBRIS FROM FLEXIBLE ENDOSCOPES.

## (undated) DEVICE — MASK,OXYGEN,MED CONC,ADLT,7' TUB, UC: Brand: PENDING

## (undated) DEVICE — SENSR O2 OXIMAX FNGR A/ 18IN NONSTR